# Patient Record
Sex: FEMALE | Race: WHITE | Employment: OTHER | URBAN - METROPOLITAN AREA
[De-identification: names, ages, dates, MRNs, and addresses within clinical notes are randomized per-mention and may not be internally consistent; named-entity substitution may affect disease eponyms.]

---

## 2019-01-07 ENCOUNTER — HOSPITAL ENCOUNTER (OUTPATIENT)
Dept: GENERAL RADIOLOGY | Age: 74
Discharge: HOME | End: 2019-01-07
Payer: MEDICARE

## 2019-01-07 DIAGNOSIS — M47.814 SPONDYLOSIS OF THORACIC REGION WITHOUT MYELOPATHY OR RADICULOPATHY: ICD-10-CM

## 2019-01-07 PROCEDURE — 72070 X-RAY EXAM THORAC SPINE 2VWS: CPT

## 2019-05-13 ENCOUNTER — HOSPITAL ENCOUNTER (OUTPATIENT)
Dept: GENERAL RADIOLOGY | Age: 74
Discharge: HOME | End: 2019-05-13
Payer: MEDICARE

## 2019-05-13 DIAGNOSIS — Z13.820 ENCOUNTER FOR IMAGING TO ASSESS OSTEOPOROSIS: ICD-10-CM

## 2019-05-13 PROCEDURE — 77080 DXA BONE DENSITY AXIAL: CPT

## 2020-12-23 ENCOUNTER — HOSPITAL ENCOUNTER (INPATIENT)
Age: 75
LOS: 16 days | Discharge: HOME HEALTH | DRG: 560 | End: 2021-01-08
Attending: PHYSICAL MEDICINE & REHABILITATION | Admitting: PHYSICAL MEDICINE & REHABILITATION
Payer: MEDICARE

## 2020-12-23 DIAGNOSIS — S72.402A CLOSED FRACTURE OF DISTAL END OF LEFT FEMUR, INITIAL ENCOUNTER (HCC): Primary | ICD-10-CM

## 2020-12-23 PROBLEM — S72.002A HIP FRACTURE REQUIRING OPERATIVE REPAIR, LEFT, CLOSED, INITIAL ENCOUNTER (HCC): Status: ACTIVE | Noted: 2020-12-23

## 2020-12-23 LAB
GLUCOSE BLD-MCNC: 139 MG/DL (ref 70–99)
PERFORMED ON: ABNORMAL

## 2020-12-23 PROCEDURE — 6370000000 HC RX 637 (ALT 250 FOR IP): Performed by: PHYSICAL MEDICINE & REHABILITATION

## 2020-12-23 PROCEDURE — 1280000000 HC REHAB R&B

## 2020-12-23 RX ORDER — HYOSCYAMINE SULFATE EXTENDED-RELEASE 0.38 MG/1
375 TABLET ORAL EVERY 12 HOURS PRN
Status: DISCONTINUED | OUTPATIENT
Start: 2020-12-23 | End: 2021-01-08 | Stop reason: HOSPADM

## 2020-12-23 RX ORDER — DEXTROSE MONOHYDRATE 25 G/50ML
12.5 INJECTION, SOLUTION INTRAVENOUS PRN
Status: DISCONTINUED | OUTPATIENT
Start: 2020-12-23 | End: 2021-01-08 | Stop reason: HOSPADM

## 2020-12-23 RX ORDER — MELOXICAM 7.5 MG/1
7.5 TABLET ORAL DAILY
Status: ON HOLD | COMMUNITY
End: 2021-01-08 | Stop reason: HOSPADM

## 2020-12-23 RX ORDER — PANTOPRAZOLE SODIUM 40 MG/1
40 TABLET, DELAYED RELEASE ORAL
Status: DISCONTINUED | OUTPATIENT
Start: 2020-12-24 | End: 2021-01-08 | Stop reason: HOSPADM

## 2020-12-23 RX ORDER — CLOPIDOGREL BISULFATE 75 MG/1
75 TABLET ORAL
Status: ON HOLD | COMMUNITY
End: 2021-01-08 | Stop reason: HOSPADM

## 2020-12-23 RX ORDER — TRAMADOL HYDROCHLORIDE 50 MG/1
50 TABLET ORAL EVERY 6 HOURS PRN
Status: DISCONTINUED | OUTPATIENT
Start: 2020-12-23 | End: 2021-01-08 | Stop reason: HOSPADM

## 2020-12-23 RX ORDER — METOPROLOL SUCCINATE 50 MG/1
50 TABLET, EXTENDED RELEASE ORAL DAILY
Status: ON HOLD | COMMUNITY
End: 2021-01-08 | Stop reason: HOSPADM

## 2020-12-23 RX ORDER — HYDROCHLOROTHIAZIDE 25 MG/1
25 TABLET ORAL DAILY
Status: ON HOLD | COMMUNITY
End: 2021-01-08 | Stop reason: HOSPADM

## 2020-12-23 RX ORDER — GABAPENTIN 300 MG/1
300 CAPSULE ORAL 3 TIMES DAILY
Status: DISCONTINUED | OUTPATIENT
Start: 2020-12-23 | End: 2021-01-02

## 2020-12-23 RX ORDER — CHOLESTYRAMINE LIGHT 4 G/5.7G
4 POWDER, FOR SUSPENSION ORAL DAILY
Status: DISCONTINUED | OUTPATIENT
Start: 2020-12-24 | End: 2021-01-08 | Stop reason: HOSPADM

## 2020-12-23 RX ORDER — DIPHENOXYLATE HYDROCHLORIDE AND ATROPINE SULFATE 2.5; .025 MG/1; MG/1
1 TABLET ORAL 4 TIMES DAILY PRN
Status: DISCONTINUED | OUTPATIENT
Start: 2020-12-23 | End: 2020-12-24

## 2020-12-23 RX ORDER — ESTRADIOL 0.1 MG/G
2 CREAM VAGINAL
Status: DISCONTINUED | OUTPATIENT
Start: 2020-12-23 | End: 2021-01-08 | Stop reason: HOSPADM

## 2020-12-23 RX ORDER — LEVOTHYROXINE SODIUM 0.12 MG/1
125 TABLET ORAL DAILY
COMMUNITY

## 2020-12-23 RX ORDER — SERTRALINE HYDROCHLORIDE 100 MG/1
100 TABLET, FILM COATED ORAL DAILY
Status: DISCONTINUED | OUTPATIENT
Start: 2020-12-24 | End: 2021-01-08 | Stop reason: HOSPADM

## 2020-12-23 RX ORDER — ACETAMINOPHEN 325 MG/1
650 TABLET ORAL EVERY 4 HOURS PRN
Status: DISCONTINUED | OUTPATIENT
Start: 2020-12-23 | End: 2021-01-08 | Stop reason: HOSPADM

## 2020-12-23 RX ORDER — INSULIN LISPRO 100 [IU]/ML
0-12 INJECTION, SOLUTION INTRAVENOUS; SUBCUTANEOUS
Status: DISCONTINUED | OUTPATIENT
Start: 2020-12-23 | End: 2020-12-30

## 2020-12-23 RX ORDER — DEXTROSE MONOHYDRATE 50 MG/ML
100 INJECTION, SOLUTION INTRAVENOUS PRN
Status: DISCONTINUED | OUTPATIENT
Start: 2020-12-23 | End: 2021-01-08 | Stop reason: HOSPADM

## 2020-12-23 RX ORDER — TIZANIDINE 2 MG/1
2 TABLET ORAL DAILY PRN
Status: ON HOLD | COMMUNITY
End: 2021-01-08 | Stop reason: HOSPADM

## 2020-12-23 RX ORDER — ERGOCALCIFEROL 1.25 MG/1
50000 CAPSULE ORAL WEEKLY
Status: DISCONTINUED | OUTPATIENT
Start: 2020-12-27 | End: 2021-01-08 | Stop reason: HOSPADM

## 2020-12-23 RX ORDER — LANOLIN ALCOHOL/MO/W.PET/CERES
1000 CREAM (GRAM) TOPICAL DAILY
Status: DISCONTINUED | OUTPATIENT
Start: 2020-12-24 | End: 2020-12-24

## 2020-12-23 RX ORDER — NITROGLYCERIN 0.4 MG/1
0.4 TABLET SUBLINGUAL EVERY 5 MIN PRN
Status: DISCONTINUED | OUTPATIENT
Start: 2020-12-23 | End: 2021-01-08 | Stop reason: HOSPADM

## 2020-12-23 RX ORDER — HYDROCHLOROTHIAZIDE 25 MG/1
25 TABLET ORAL DAILY
Status: DISCONTINUED | OUTPATIENT
Start: 2020-12-24 | End: 2021-01-06

## 2020-12-23 RX ORDER — SERTRALINE HYDROCHLORIDE 100 MG/1
100 TABLET, FILM COATED ORAL NIGHTLY
COMMUNITY

## 2020-12-23 RX ORDER — HYDROCODONE BITARTRATE AND ACETAMINOPHEN 5; 325 MG/1; MG/1
1 TABLET ORAL EVERY 6 HOURS PRN
Status: DISCONTINUED | OUTPATIENT
Start: 2020-12-23 | End: 2020-12-30

## 2020-12-23 RX ORDER — ALPRAZOLAM 0.5 MG/1
0.5 TABLET ORAL 2 TIMES DAILY PRN
Status: DISCONTINUED | OUTPATIENT
Start: 2020-12-23 | End: 2021-01-08 | Stop reason: HOSPADM

## 2020-12-23 RX ORDER — PRAVASTATIN SODIUM 40 MG
20 TABLET ORAL NIGHTLY
Status: DISCONTINUED | OUTPATIENT
Start: 2020-12-23 | End: 2020-12-24

## 2020-12-23 RX ORDER — INSULIN LISPRO 100 [IU]/ML
0-6 INJECTION, SOLUTION INTRAVENOUS; SUBCUTANEOUS NIGHTLY
Status: DISCONTINUED | OUTPATIENT
Start: 2020-12-23 | End: 2020-12-30

## 2020-12-23 RX ORDER — TIZANIDINE 4 MG/1
2 TABLET ORAL EVERY 8 HOURS PRN
Status: DISCONTINUED | OUTPATIENT
Start: 2020-12-23 | End: 2021-01-02

## 2020-12-23 RX ORDER — CLOPIDOGREL BISULFATE 75 MG/1
75 TABLET ORAL DAILY
Status: DISCONTINUED | OUTPATIENT
Start: 2020-12-24 | End: 2021-01-08 | Stop reason: HOSPADM

## 2020-12-23 RX ORDER — METOPROLOL SUCCINATE 50 MG/1
50 TABLET, EXTENDED RELEASE ORAL DAILY
Status: DISCONTINUED | OUTPATIENT
Start: 2020-12-24 | End: 2021-01-08 | Stop reason: HOSPADM

## 2020-12-23 RX ORDER — POLYETHYLENE GLYCOL 3350 17 G/17G
17 POWDER, FOR SOLUTION ORAL DAILY PRN
Status: DISCONTINUED | OUTPATIENT
Start: 2020-12-23 | End: 2021-01-08 | Stop reason: HOSPADM

## 2020-12-23 RX ORDER — NICOTINE POLACRILEX 4 MG
15 LOZENGE BUCCAL PRN
Status: DISCONTINUED | OUTPATIENT
Start: 2020-12-23 | End: 2021-01-08 | Stop reason: HOSPADM

## 2020-12-23 RX ORDER — GABAPENTIN 300 MG/1
300 CAPSULE ORAL 3 TIMES DAILY
Status: ON HOLD | COMMUNITY
End: 2021-01-08 | Stop reason: HOSPADM

## 2020-12-23 RX ADMIN — ALPRAZOLAM 0.5 MG: 0.5 TABLET ORAL at 23:03

## 2020-12-23 RX ADMIN — GABAPENTIN 300 MG: 300 CAPSULE ORAL at 20:46

## 2020-12-23 RX ADMIN — TIZANIDINE 2 MG: 4 TABLET ORAL at 20:47

## 2020-12-23 RX ADMIN — APIXABAN 2.5 MG: 5 TABLET, FILM COATED ORAL at 20:46

## 2020-12-23 RX ADMIN — DICLOFENAC 2 G: 10 GEL TOPICAL at 20:47

## 2020-12-23 RX ADMIN — PRAVASTATIN SODIUM 20 MG: 40 TABLET ORAL at 20:46

## 2020-12-23 RX ADMIN — HYDROCODONE BITARTRATE AND ACETAMINOPHEN 1 TABLET: 5; 325 TABLET ORAL at 20:47

## 2020-12-23 ASSESSMENT — PAIN DESCRIPTION - PROGRESSION: CLINICAL_PROGRESSION: GRADUALLY IMPROVING

## 2020-12-23 ASSESSMENT — PAIN DESCRIPTION - ORIENTATION: ORIENTATION: RIGHT

## 2020-12-23 ASSESSMENT — PAIN DESCRIPTION - ONSET: ONSET: ON-GOING

## 2020-12-23 ASSESSMENT — PAIN SCALES - GENERAL: PAINLEVEL_OUTOF10: 6

## 2020-12-23 NOTE — PLAN OF CARE
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[facility]    Nodule of right lung    Diabetes mellitus ([facility]    HTN (hypertension)    GERD (gastroesophageal reflux disease)    Arthritis    Hypothyroidism    Anxiety disorder    Gastroesophageal reflux disease with hiatal hernia    Status post lung surgery    Intermittent atrial fibrillation (HCC)    B12 deficiency    Iron deficiency anemia due to dietary causes    Unspecified adverse effect of other drug, medicinal and biological substance(995.29)    Factor V Leiden ([facility]    Pulmonary embolism (HCC)    Deep vein thrombosis (DVT) (HCC)    Treatment    Iron deficiency    Malabsorption of iron       Rehabilitation Diagnosis:  Orthopedic, 8.9, Other Orthopedic   ADMIT DATE:2020    Patient Goals:  To safely return home with sposue   Admitting Impairments: Decreased ability to ambulate, decreased mobility, decreased balance, decreased safety, decreased ability to transfer, decreased ability to perform ADLs  Activity Restrictions:   WB precautions  Participation Limitations: none   CARE PLAN     NURSING:  Leighton Chanel while on this unit will:  [] Be continent of bowel and bladder     [x] Have an adequate number of bowel movements  [x] Urinate with no urinary retention >300ml in bladder  [] Complete bladder protocol with villegas removal  [] Maintain O2 SATs at ___%  [x] Have pain managed while on ARU       [] Be pain free by discharge   [x] Have no skin breakdown while on ARU  [x] Have improved skin integrity via wound measurements  [] Have no signs/symptoms of infection at the wound site  [x] Be free from injury during hospitalization [] Complete education with patient/family with understanding demonstrated for:  [] Adjustment   [] Other:     Nursing Interventions will include:  [] bowel/bladder training   [] education for medical assistive devices   [x] medication education   [] O2 saturation management   [x] energy conservation   [x] stress management techniques   [x] fall prevention   [x] alarms protocol   [x] seating and positioning   [] skin/wound care   [x] pressure relief instruction   [] dressing changes     [x] infection protection   [x] DVT prophylaxis  [x] assistance with in room safety with transfers to bed, toilet, wheelchair, shower   [x] bathroom activities and hygiene  [] Other:    Patient/Caregiver Education for:  [] Disease/sustained injury/management     [] Medication Use  [x] Surgical intervention  [x] Safety  [x] Body mechanics and or joint protection  [x] Health maintenance     [] Other:     PHYSICAL THERAPY:  Goals: These goals were reviewed with this patient at the time of assessment and Jennifer Dys is in agreement. Plan of Care: Pt to be seen 5 out of 7 days per week per ARU protocol (90 minutes with PT)                       OCCUPATIONAL THERAPY:  Goals:               :    :  These goals were reviewed with this patient at the time of assessment and Jennifer Dys is in agreement    Plan of Care:  Pt to be seen 5 out of 7 days per week per ARU protocol (90 minutes with OT)       SPEECH THERAPY: Goals will be left blank if speech is not following this patient.   Goals:                                                                               Plan of Care:  Pt to be seen 5 out of 7 days per week per ARU protocol (0 minutes with SLP)    Therapy Treatments will include: [x]  therapeutic exercises    [x]  gait training     []  neuromuscular re-ed                            [x]  transfer training             [] community reintegration    [x] bed mobility                          [x]  w/c mobility and training  [x]  self care    [x]home mgmt    []  cognitive training            [x]  energy conservation        []  dysphagia tx    []  speech/language/communication therapy   []  group therapy    [x]  patient/family education    [] Other:    CASE MANAGEMENT:  Goals: Plan return home with spouse and skilled Home Care. SW to also assist with DME needs. Assist patient/family with discharge planning, patient/family counseling,   and coordination with insurance during ARU stay. Admission Period/Goal QM SCORES  QM Admit/Goal Score   Eating   /     Oral Hygiene   /     Shower/Bathing   /     UB Dressing   /     LB Dressing   /     Putting on/off Footwear   /     Toileting Hygiene   /     Bladder Continence      Bowel Continence      Toilet Transfers   /     Shower/Bathe Self    /     Rolling Left and Right   /     Sit to Lying   /     Lying to Sitting on Bedside   /     Sit to Stand   /     Chair/Bed to Chair Transfer   /     Car Transfers   /     Walk 10 Feet   /     Walk 50 Feet with Two Turns   /     Walk 150 Feet   /     Walk 10 Feet on Uneven Surfaces   /     1 Step (Curb)   /     4 Steps   /     12 Steps   /     Picking up Object from Floor   /     Wheel 50 Feet with 2 Turns   /     Type         [] Manual        [] Motorized        [] N/A   Wheel 150 Feet   /     Type         [] Manual        [] Motorized        [] N/A        Vladimir Stewart will be seen a minimum of 3 hours of therapy per day, a minimum of 5 out of 7 days per week (please see above for specific treatment plan per PT/OT/SLP). [] In this rare instance due to the nature of this patient's medical involvement, this patient will be seen 15 hours per week (900 minutes within a 7day period). In addition, dietician/nutritionist may monitor calorie count as well as intake and collaboratively work with SLP on dietary upgrades. Neuropsychology/Psychology may evaluate and provide necessary support.     Medical issues being managed closely and that require 24hour availability of a physician:  [] Swallowing Precautions  [x] Bowel/Bladder Fx  [] Weight bearing precautions  [x] Wound Care    [x] Pain Mgmt   [] Infection Protection  [x] DVT Prophylaxis   [x] Fall Precautions  [x] Fluid/Electrolyte/Nutrition Balance  [] Voice Protection   [] Respiratory  [] Other:    Medical Prognosis: [x] Good  [] Fair    [] Guarded   Total expected IRF days 17  Anticipated discharge destination: Home with sposue  [x] Home Independently   [] Home Modified Independent  [] Home with supervision    []SNF     [] Other                                           Physician anticipated functional outcomes:  Home with LRAD IPOC brief synthesis: 66year old matteo who fell in her bathroom yesterday morning when her right leg gave out causing her to fall jackson her left side. She had immediate pain in left leg and inability to ambulate so was brought to [facility] ED by EMS. Imaging revealed left distal femur fracture. PMH significant for chronic back pain, bilateral total knee arthroplasties, & Diabetes. She has chronic right leg pain due to documented L4-5 lumbar stenosis. She is followed by Dr. Jovani Hernandez for iron deficiency anemia. She has a history of heterozygous factor V Leiden discovered at time of PE in 2015, was fully anticoagulated for 1 yr, then placed on ASA, but had reaction. She was NOT on any anticoagulation prior to admission. Received a dose or 2 of heparin pre-op. ORIF of the left femur was completed 12/20/2020. Post-op complications include UTI, anemia, and shooting/burning pain involving her right calf. Patient believes this pain is associated with her neuropathic pain from her lumbar stenosis. MD ordered doppler since patient has a hx of DVT. She was due for B12 injection in office next week but hematology decided to administer B 12 on 12/23/20 prior to transfer to ARU. Patient has remained on prophylactic Eliquis and venous ultrasound was unremarkable. On the day of DC to ARU she received 1 IV iron sucrose 300 mg prior to transfer. She is planned to receive 2 more doses at rehab.  Patient was evaluated by PT/OT and is currently functioning below her baseline. Prior to admission she was independent with all adls, iadls, and functional mobility without and AD. She is now requiring assist x 2 for functional mobility. Patient is motivated and able to tolerate 3 hrs of therapy per day in inpatient rehab. She is now medically stable for transfer. This initial ARU patient treatment plan of care, together with the IPOC & the Education plan, form the foundation for the patient's plan of care. Weekly patient care conferences are held to evaluate progress towards the initial treatment plan & goals.     I have reviewed this initial plan of care and agree with its contents:    Title   Name    Date    Time    Physician: Ashley Lopez D.O. M.P.H  PM&R  12/26/2020  11:15 AM        Case Mgmt: ABDIRASHID Whittington  12/24/2020  1204    OT: Reshma Oconnell OTR/L 12/24/2020 @     PT: Amari Plunkett LPT # 9722 12/24/2020 @ [phone]    RN: Liya Melendez RN 12/23/2020 1843    ST:    :  Tasia Lyons MA, PD  12/26/2020 12:10    Other:

## 2020-12-23 NOTE — CARE COORDINATION
Patient Name: Chanell Lisa  : 1945  Address  [address] [place] [id number]      Emergency Contact(s)  Yao Worthington Daughter [phone]      [facility] Daughter [phone]      Mario Lincoln Daughter [phone]          Insurance:   Valentina Barnett / Kayley Garlandter [id number]  [address] [place] [address] / Manual Osman [id number]     Referring MD: Sally Aragon MD  Primary Care Physician  Harvey Burgos MD     Dx: Displaced spiral fracture of shaft of left femur ([address]     Allergies    Aspirin  Shortness of Breath        20 Stated this was years ago. Had stuffy nose and red eyes. Did not mention SOB. Stated she may be fine to take this now.   Percocet [Oxycodone-Acetaminophen]  Confusion        20 stated she was 'off the wall' w/ Percocet.   Bactrim  Rash    Codeine  Rash    Nickel  Rash    Penicillins  Rash    Sulfamethoxazole-Trimethoprim  Rash    Trimethoprim  Unknown    Consults:    IP CONSULT TO ONCOLOGY  IP CONSULT TO CARDIOLOGY  IP CONSULT TO ORTHOPEDIC SURGERY  IP CONSULT TO NUTRITION SERVICES  IP CONSULT TO PHYSICAL THERAPY  IP CONSULT TO OCCUPATIONAL THERAPY  PHARMACY TO DOSE MEDICATION     Procedures:  Mri Lumbar Spine Wo Con     Result Date: 2020  L4-5 circumferential disc bulge with a right synovial facet cyst in the posterior right epidural space resulting in severe stenosis of the right subarticular zone/lateral recess, with mass effect on the descending right nerve roots.       Xr Chest Ap Portable     Result Date: 2020  No acute abnormality      Xr Femur Left Ap And Lateral     Result Date: 2020  No unexpected findings on limited intraoperative radiographic exam       Xr Femur Left Ap And Lateral     Result Date: 2020   Distal femoral shaft spiral fracture with mild displacement       Xr Hip Left Ap And Lat W Or Wo Pelvis     Result Date: 2020 Limited evaluation of left hip. Consider cross-sectional imaging if patient is nonweightbearing       Ct Hip Left Wo Contrast     Result Date: 12/20/2020  Normal hips. Procedure(s):  OPEN REDUCTION INTERNAL FIXATION LEFT DISTAL FEMUR     Physical Exam:    BP 97/53 (Patient Position: Lying)   Pulse 83   Temp 98.6 °F (37 °C) (Oral)   Resp 18   Ht 62\" (157.5 cm)   Wt 187 lb (84.8 kg)   SpO2 99%   BMI 34.20 kg/m²    Precautions  Weight Bearing  left lower extremity toe touch flat foot  Brace/Sling (wear)  knee immobilizer  Additional Comments  fall risk    Diet: Regular    DVT Proph: apixaban 2.5 mg Tabs  Commonly known as: ELIQUIS  Take 1 tablet by mouth 2 (two) times daily.     Covid PCR Negative on 12/20/20 1. Left femur fracture. Test post ORIF 12/21/2020. Patient will proceed with treatment per orthopedic protocol. 2. Diabetes. Patient will continue with bolus correction regimen. Adjust as required once oral take is increased. 3. Coagulopathy. Patient has a history of heterozygous factor V Leiden deficiency. Patient has started Eliquis 2.5 mg by mouth twice a day per hematology recommendations. 4. Coronary artery disease. Clopidogrel has been restarted. 5. UTI. Patient's preprocedure urinalysis is consistent with urinary tract infection, greater than 100,000 CFU Klebsiella pneumonia. She continues on 1 g IV daily pending culture results. 6. Normocytic anemia. Hemoglobin on admission was 11.8. It has decreased to 8.7 on 12/22 and 7.3 on 12/23. Continue to monitor. Discharge Medications:        Medication List     START taking these medications   apixaban 2.5 mg Tabs  Commonly known as: ELIQUIS     hydrocodone-acetaminophen  MG Tabs  Commonly known as: [address]  Replaces: hydrocodone-acetaminophen 7.5-325 MG Tabs     * insulin aspart 100 UNIT/ML Soln injection  Commonly known as: NOVOLOG     * insulin aspart 100 UNIT/ML Soln injection  Commonly known as: NOVOLOG     traMADol 50 MG Tabs  Commonly known as: ULTRAM        (very important) * This list has 2 medication(s) that are the same as other medications prescribed for you. Read the directions carefully, and ask your doctor or other care provider to review them with you.         CONTINUE taking these medications   ALPRAZolam 0.5 MG Tabs  Commonly known as: XANAX  TAKE (1) TABLET BY MOUTH TWICE A DAY AS NEEDED FOR UP TO 30 DAYS     clopidogrel 75 mg Tabs  Commonly known as: PLAVIX     colestipol 1 g Tabs  Commonly known as: COLESTID     cyanocobalamin 1000 MCG/ML Soln  Commonly known as: VITAMIN B12     diclofenac 1 % Gel  Commonly known as: VOLTAREN GEL     diphenhydrAMINE-acetaminophen  MG Tabs  Commonly known as: TYLENOL PM diphenoxylate-atropine 2.5-0.025 MG Tabs  Commonly known as: LOMOTIL  TAKE 1 TABLET BY MOUTH 2 (TWO) TIMES DAILY     ergocalciferol 1.25 MG (92678 UT) Caps  Commonly known as: ERGOCALCIFEROL     estradiol 0.1 MG/GM Crea  Commonly known as: ESTRACE     gabapentin 300 MG Caps  Commonly known as: NEURONTIN     hydrochlorothiazide 25 MG Tabs  Commonly known as: HYDRODIURIL  Take 1 tablet by mouth daily. hyoscyamine 0.375 MG Tb12  Commonly known as: LEVBID  TAKE 1 TABLET BY MOUTH DAILY     levothyroxine 125 MCG Tabs  Commonly known as: SYNTHROID, LEVOTHROID  Take 1 tablet by mouth daily. metoprolol succinate 50 MG Tb24  Commonly known as: TOPROL-XL  Take 1 tablet by mouth daily. nitroGLYCERIN 0.4 MG Subl  Commonly known as: NITROSTAT  1 tablet by Sublingual route every 5 (five) minutes as needed (Every 5 minutes as needed for chest painfor up to 3 doses per episode). * omeprazole 40 MG Cpdr  Commonly known as: PRILOSEC     * omeprazole 20 MG Cpdr  Commonly known as: PRILOSEC     pravastatin 20 MG Tabs  Commonly known as: PRAVACHOL  TAKE 1/2 TABLET (10 MG) BY MOUTH DAILY.      Probiotic Product Chew     sertraline 100 MG Tabs  Commonly known as: ZOLOFT     tizanidine 2 MG Tabs  Commonly known as: Edgard Hutching

## 2020-12-23 NOTE — PROGRESS NOTES
4 Eyes Skin Assessment     The patient is being assess for   Admission    I agree that 2 RN's have performed a thorough Head to Toe Skin Assessment on the patient. ALL assessment sites listed below have been assessed. Areas assessed by both nurses:   [x]   Head, Face, and Ears   [x]   Shoulders, Back, and Chest, Abdomen  [x]   Arms, Elbows, and Hands   [x]   Coccyx, Sacrum, and Ischium  [x]   Legs, Feet, and Heels            **SHARE this note so that the co-signing nurse is able to place an eSignature**    Co-signer eSignature: Electronically signed by Zakiya Maynard on 12/23/20 at 6:57 PM EST    Does the Patient have Skin Breakdown? Yes LDA WOUND CARE was Initiated documentation include the Bertha-wound, Wound Assessment, Measurements, Dressing Treatment, Drainage, and Color\", Surgical incision to left leg with staples. Scattered bruising.           Wai Prevention initiated:  Yes   Wound Care Orders initiated:  Yes      [address]  nurse consulted for Pressure Injury (Stage 3,4, Unstageable, DTI, NWPT, Complex wounds)and New or Established Ostomies:  NA      Primary Nurse eSignature: Electronically signed by Colton Moran RN on 12/23/20 at 6:49 PM EST

## 2020-12-23 NOTE — PROGRESS NOTES
Jhonatan Murray  12/23/2020  [id number]    Rehab Brief Consult:    Patient is able to tolerate 3 hours of therapy 5 days per week and is medically appropriate for rehab at the Acute Rehabilitation Unit. Approved for admission today. Orders placed for transfer. Thank you for the consultation.     Ankush Boogie D.O. M.P.H  PM&R  12/23/2020  5:08 PM

## 2020-12-24 LAB
GLUCOSE BLD-MCNC: 152 MG/DL (ref 70–99)
GLUCOSE BLD-MCNC: 152 MG/DL (ref 70–99)
GLUCOSE BLD-MCNC: 163 MG/DL (ref 70–99)
GLUCOSE BLD-MCNC: 174 MG/DL (ref 70–99)
PERFORMED ON: ABNORMAL

## 2020-12-24 PROCEDURE — 97166 OT EVAL MOD COMPLEX 45 MIN: CPT

## 2020-12-24 PROCEDURE — 1280000000 HC REHAB R&B

## 2020-12-24 PROCEDURE — 97530 THERAPEUTIC ACTIVITIES: CPT

## 2020-12-24 PROCEDURE — 6370000000 HC RX 637 (ALT 250 FOR IP): Performed by: PHYSICAL MEDICINE & REHABILITATION

## 2020-12-24 PROCEDURE — 97530 THERAPEUTIC ACTIVITIES: CPT | Performed by: PHYSICAL THERAPIST

## 2020-12-24 PROCEDURE — 97535 SELF CARE MNGMENT TRAINING: CPT

## 2020-12-24 PROCEDURE — 97162 PT EVAL MOD COMPLEX 30 MIN: CPT | Performed by: PHYSICAL THERAPIST

## 2020-12-24 RX ORDER — DIPHENOXYLATE HYDROCHLORIDE AND ATROPINE SULFATE 2.5; .025 MG/1; MG/1
1 TABLET ORAL 2 TIMES DAILY
Status: DISCONTINUED | OUTPATIENT
Start: 2020-12-24 | End: 2021-01-08 | Stop reason: HOSPADM

## 2020-12-24 RX ORDER — PRAVASTATIN SODIUM 10 MG
10 TABLET ORAL NIGHTLY
Status: DISCONTINUED | OUTPATIENT
Start: 2020-12-24 | End: 2021-01-08 | Stop reason: HOSPADM

## 2020-12-24 RX ADMIN — LEVOTHYROXINE SODIUM 125 MCG: 0.03 TABLET ORAL at 06:14

## 2020-12-24 RX ADMIN — HYDROCODONE BITARTRATE AND ACETAMINOPHEN 1 TABLET: 5; 325 TABLET ORAL at 12:45

## 2020-12-24 RX ADMIN — ALPRAZOLAM 0.5 MG: 0.5 TABLET ORAL at 21:55

## 2020-12-24 RX ADMIN — HYDROCODONE BITARTRATE AND ACETAMINOPHEN 1 TABLET: 5; 325 TABLET ORAL at 06:19

## 2020-12-24 RX ADMIN — GABAPENTIN 300 MG: 300 CAPSULE ORAL at 08:17

## 2020-12-24 RX ADMIN — HYDROCODONE BITARTRATE AND ACETAMINOPHEN 1 TABLET: 5; 325 TABLET ORAL at 18:54

## 2020-12-24 RX ADMIN — DICLOFENAC 2 G: 10 GEL TOPICAL at 21:45

## 2020-12-24 RX ADMIN — DIPHENOXYLATE HYDROCHLORIDE AND ATROPINE SULFATE 1 TABLET: 2.5; .025 TABLET ORAL at 21:55

## 2020-12-24 RX ADMIN — APIXABAN 2.5 MG: 5 TABLET, FILM COATED ORAL at 08:17

## 2020-12-24 RX ADMIN — SERTRALINE HYDROCHLORIDE 100 MG: 100 TABLET ORAL at 08:18

## 2020-12-24 RX ADMIN — INSULIN LISPRO 2 UNITS: 100 INJECTION, SOLUTION INTRAVENOUS; SUBCUTANEOUS at 07:58

## 2020-12-24 RX ADMIN — INSULIN LISPRO 2 UNITS: 100 INJECTION, SOLUTION INTRAVENOUS; SUBCUTANEOUS at 17:36

## 2020-12-24 RX ADMIN — HYDROCHLOROTHIAZIDE 25 MG: 25 TABLET ORAL at 08:18

## 2020-12-24 RX ADMIN — APIXABAN 2.5 MG: 5 TABLET, FILM COATED ORAL at 21:55

## 2020-12-24 RX ADMIN — HYOSCYAMINE SULFATE 375 MCG: 0.38 TABLET, EXTENDED RELEASE ORAL at 08:17

## 2020-12-24 RX ADMIN — METOPROLOL SUCCINATE 50 MG: 50 TABLET, EXTENDED RELEASE ORAL at 10:26

## 2020-12-24 RX ADMIN — GABAPENTIN 300 MG: 300 CAPSULE ORAL at 14:49

## 2020-12-24 RX ADMIN — GABAPENTIN 300 MG: 300 CAPSULE ORAL at 21:55

## 2020-12-24 RX ADMIN — PANTOPRAZOLE SODIUM 40 MG: 40 TABLET, DELAYED RELEASE ORAL at 06:14

## 2020-12-24 RX ADMIN — DICLOFENAC 2 G: 10 GEL TOPICAL at 08:20

## 2020-12-24 RX ADMIN — INSULIN LISPRO 2 UNITS: 100 INJECTION, SOLUTION INTRAVENOUS; SUBCUTANEOUS at 21:49

## 2020-12-24 RX ADMIN — CLOPIDOGREL BISULFATE 75 MG: 75 TABLET ORAL at 08:18

## 2020-12-24 RX ADMIN — INSULIN LISPRO 2 UNITS: 100 INJECTION, SOLUTION INTRAVENOUS; SUBCUTANEOUS at 12:16

## 2020-12-24 RX ADMIN — CHOLESTYRAMINE 4 G: 4 POWDER, FOR SUSPENSION ORAL at 08:18

## 2020-12-24 ASSESSMENT — PAIN DESCRIPTION - LOCATION
LOCATION: LEG

## 2020-12-24 ASSESSMENT — PAIN DESCRIPTION - ONSET
ONSET: ON-GOING
ONSET: ON-GOING

## 2020-12-24 ASSESSMENT — PAIN SCALES - GENERAL
PAINLEVEL_OUTOF10: 8
PAINLEVEL_OUTOF10: 6
PAINLEVEL_OUTOF10: 7
PAINLEVEL_OUTOF10: 7

## 2020-12-24 ASSESSMENT — PAIN DESCRIPTION - PAIN TYPE: TYPE: SURGICAL PAIN

## 2020-12-24 ASSESSMENT — PAIN - FUNCTIONAL ASSESSMENT: PAIN_FUNCTIONAL_ASSESSMENT: PREVENTS OR INTERFERES SOME ACTIVE ACTIVITIES AND ADLS

## 2020-12-24 ASSESSMENT — PAIN DESCRIPTION - FREQUENCY
FREQUENCY: CONTINUOUS
FREQUENCY: CONTINUOUS

## 2020-12-24 ASSESSMENT — PAIN DESCRIPTION - PROGRESSION
CLINICAL_PROGRESSION: GRADUALLY IMPROVING
CLINICAL_PROGRESSION: NOT CHANGED

## 2020-12-24 ASSESSMENT — PAIN DESCRIPTION - ORIENTATION: ORIENTATION: LEFT

## 2020-12-24 NOTE — PROGRESS NOTES
Occupational Therapy   Occupational Therapy Initial Assessment/Treatment Note  Date: 2020   Patient Name: Apple Keene  MRN: [id number]     : 1945    Date of Service: 2020    Discharge Recommendations:  Continue to assess pending progress, 24 hour supervision or assist, Home with Home health OT  OT Equipment Recommendations  Other: continue to assess pending progress    Assessment   Performance deficits / Impairments: Decreased functional mobility ; Decreased endurance;Decreased ADL status; Decreased balance;Decreased strength;Decreased high-level IADLs;Decreased fine motor control  Assessment: Pt is a 67 y/o F s/p mechanical fall at home resulting in L distal femur fracture and now has weightbearing restrictions on LLE resulting in a significant decline in functional status. Pt requires 2 person assist for toileting and LB bathing and dressing d/t impaired standing balance, decreased strength and WBing limitations. Pt is non-compliant with WBing restrictions and requires frequent VCs. Pt will benefit from skilled OT to maximize functional independence for safe d/c home. Treatment Diagnosis: decreased ADL status, functional transfers and functional mobility 2/2 L distal femur fx  Prognosis: Good  Decision Making: Medium Complexity  OT Education: OT Role;Plan of Care;Precautions; ADL Adaptive Strategies;Transfer Training  REQUIRES OT FOLLOW UP: Yes  Activity Tolerance  Activity Tolerance: Patient Tolerated treatment well  Activity Tolerance: pt reporting fatigue after shower  Safety Devices  Safety Devices in place: Yes  Type of devices: Left in chair;Call light within reach; Chair alarm in place; All fall risk precautions in place           Patient Diagnosis(es): There were no encounter diagnoses. has a past medical history of Allergic, Anemia, Arthritis, Chronic kidney disease, Depression, Diabetes mellitus ([address] Disorders affecting multiple structures of eye, Epigastric pain, GERD (gastroesophageal reflux disease), Hyperlipidemia, Hypertension, IBS (irritable bowel syndrome), Intermittent atrial fibrillation ([address] Mass of lung, Migraine, Nausea & vomiting, Status post lung surgery, and Thyroid disease. has a past surgical history that includes brain surgery; joint replacement; Cholecystectomy; Bunionectomy; Hysterectomy; thoracotomy (3/10/11); Endoscopy, colon, diagnostic (8/22/2011); Appendectomy; colectomy; Cataract removal (2012); and knee surgery (Left, 10/11/2016). Treatment Diagnosis: decreased ADL status, functional transfers and functional mobility 2/2 L distal femur fx      Restrictions  Restrictions/Precautions  Restrictions/Precautions: Weight Bearing  Required Braces or Orthoses?: Yes(L knee immobilizer)  Lower Extremity Weight Bearing Restrictions  Left Lower Extremity Weight Bearing: Foot Flat Weight Bearing(Both TTWB and foot flat WB restrictions are noted in chart)  Position Activity Restriction  Other position/activity restrictions: L TTWB and foot flat WB listed, L knee immobilizer on at all times    Subjective   General  Chart Reviewed: Yes  Patient assessed for rehabilitation services?: Yes  Additional Pertinent Hx: 67 y/o F with mechanical fall at home in bathroom. Imaging revealed left distal femur fracture and is now TTWB on LLE. PMH significant for chronic back pain, bilateral total knee arthroplasties, & Diabetes  Family / Caregiver Present: No  Referring Practitioner: Dr Amador Zamora  Diagnosis: L distal femur fracture  Subjective  Subjective: Pt sitting on BSC urinating with PT and PCA present, pleasant and agreeable to OT/PT cotx.     Social/Functional History  Social/Functional History  Lives With: Spouse  Type of Home: House Home Layout: Able to Live on Main level with bedroom/bathroom  Home Access: Stairs to enter without rails  Entrance Stairs - Number of Steps: 2  Bathroom Shower/Tub: Walk-in shower  Bathroom Toilet: Standard  Bathroom Equipment: Toilet raiser  Bathroom Accessibility: Walker accessible  Home Equipment: Rolling walker, Cane  Receives Help From: Family, Home health  ADL Assistance: Independent  Homemaking Assistance: Needs assistance  Ambulation Assistance: Independent  Transfer Assistance: Independent  Active : Yes  Mode of Transportation: Car  Education: Completed 11th grade and then got   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  Leisure & Hobbies: Elsa  Additional Comments: only 1 fall this past year       Objective        Orientation  Overall Orientation Status: Within Functional Limits  Observation/Palpation  Observation: L LE in KI  Edema: Edema noted in RLE and foot  Scar: s/p R TKR with scar well healed  Balance  Sitting Balance: Supervision(seated on BSC and TTB)  Standing Balance: Minimal assistance(with BUE supported at GB/RW)  Standing Balance  Time: less than 1 minute  Activity: functional transfers; LB bathing/dressing  Comment: Pt non compliant with TTWB despite VCs and placement of foot on therapist shoe to monitor; pt c/o fatigue in [id number] in stance  Functional Mobility  Functional Mobility Comments: not assessed d/t time constraints  Toilet Transfers  Toilet - Technique: Stand pivot  Equipment Used: Standard bedside commode  Toilet Transfer: 2 Person assistance;Dependent/Total  Toilet Transfers Comments: Mod A x 2; assist more for pivoting and to maintain TTWB/mgmt of LLE vs physically assisting with STS  Tub Transfers  Tub - Transfer From: Wheelchair  Tub - Transfer Type: To and From  Tub - Transfer To: Transfer tub bench  Tub - Technique: Lateral  Tub Transfers:  Moderate assistance Comment: pt reports decreased hand strength gradually getting worse making it difficult for her to clasp and unbutton with dressing as well as opening bottles and containers        Transfers  Sit to stand: Minimal assistance(from TTB with BUE on GBs)  Stand to sit: Minimal assistance(to control descent)  Vision - Basic Assessment  Prior Vision: Wears glasses only for reading  Cognition  Overall Cognitive Status: WFL        Sensation  Overall Sensation Status: WNL           LUE Strength  L Hand General: 4-/5  RUE Strength  R Hand General: 4-/5  L Fingers  Left Finger Strength Comment: decreased gross grasp strength  R Fingers  Right Finger Strength Comment: decreased gross grasp strength  Hand Dominance  Hand Dominance: Right       Second Session:  Pt seated in wc upon entry, very pleasant and reports pain of \"7, LLE, RLE and back. The nurse already gave me my medicine. \" Pt stating, \"I don't want to cry, but I've been  to my  for 60 years and this is the first Holly we won't spend together. \" Pt  mobility Supervision in room ~15 ft. Pt sit pivot to EOB Max A + Min A. Pt sit EOB SBA. Pt scooting SBA/CGA. Pt sit to supine Min A. Pt rolling left/right Max A, bed flat no bed rail. Pt supine to sit Mod A. Pt sit EOB SBA. Pt sit to supine CGA 2nd trial. Pt with use of bed rails pulled self towards HOB.  Call light in reach and bed alarm on.          Plan   Plan  Times per week: 90 minutes per day 5 days/week  Times per day: Daily  Current Treatment Recommendations: Strengthening, Patient/Caregiver Education & Training, Home Management Training, Equipment Evaluation, Education, & procurement, Balance Training, Functional Mobility Training, Endurance Training, Safety Education & Training, Self-Care / ADL, Wheelchair Mobility Training    G-Code     OutComes Score                                                  AM-PAC Score             Goals  Short term goals  Time Frame for Short term goals: STG=LTG Long term goals  Time Frame for Long term goals : 2 weeks  Long term goal 1: Pt will complete LB dressing with use of AE prn with spvn  Long term goal 2: Pt will complete UB dressing with mod I  Long term goal 3: Pt will complete bathing with spvn  Long term goal 4: Pt will complete toilet transfer with spvn maintaining weightbearing restrictions  Long term goal 5: Pt will complete toileting with spvn  Patient Goals   Patient goals :  To be more independent       Therapy Time   Individual Concurrent Group Co-treatment   Time In 0953     0845   Time Out 1013     0953   Minutes 20     68   Timed Code Treatment Minutes: 88 Minutes     Second Session Therapy Time:    Individual Concurrent Group Co-treatment   Time In     1345   Time Out     1415   Minutes     30      Timed Code Treatment Minutes:  88 + 60=971     [facility], [address] [facility], [place]/ [id number] (Second session only)

## 2020-12-24 NOTE — PROGRESS NOTES
Physical Therapy    Facility/Department: [facility] ACUTE REHAB UNIT  Initial Assessment/ Daily treatment note    NAME: Sage Licona  : 1945  MRN: [id number]    Date of Service: 2020    Discharge Recommendations:  Home with assist PRN, Patient would benefit from continued therapy after discharge, Home with Home health PT   PT Equipment Recommendations  Other: Ongoing assessment at this time    Assessment   Body structures, Functions, Activity limitations: Decreased functional mobility ; Decreased ADL status; Decreased endurance;Decreased strength;Decreased balance; Increased pain  Assessment: Pt is a 70YO female s/p a fall resulting in a L distal femur fx impacting overall functional mobility including transf, walking and standing balance with WB limitations on the LLE with continual use of the KI . Pt req assist x 2 for functional transf on this date. Secondary to using the bathroom, taking a shower and the dressing and undressing associated with the shower, assessment of bed mobility, w/c and tranf was not completed in am session. Pt is well motivated to return home ASAP. Pt is well below baseline and would benefit from continued therapy to maximize potential and increase functional mobility towards Ind to allow for a safer d/c to home. 2nd Session: Pt is very pleasant and willing to work hard but limited by RLE weakness and LLE NWB restrictions. Propelled a 20\" cassia height w/c for approx 15 ft making turns in tight spaces and positioning the w/c in order to transfer back to bed; requiring cues and min assist. Pt is able to lock and unlock both brakes at a supervision level; but required max assist to burke and doff leg rest and to doff armrest. Squat pivot transfers to the right are very unsteady and require Max assist x1 + Min assist to guide hips. Sit to supine: CGA to min assist; Supine to sit: Mod assist. Rolling left to right required mod assist. Pt was left in bed as pt has been up in the chair since 6am.    Treatment Diagnosis: Decreased functional mobility 2/2 to distal femur fx  Prognosis: Good  Decision Making: Medium Complexity  PT Education: Goals;Transfer Training;PT Role;Energy Conservation; Functional Mobility Training;Plan of Care;General Safety;Weight-bearing Education; Adaptive Device Training;Precautions;Gait Training  Patient Education: Needs reinforcement of WB precautions  Barriers to Learning: None  REQUIRES PT FOLLOW UP: Yes       Patient Diagnosis(es): There were no encounter diagnoses. has a past medical history of Allergic, Anemia, Arthritis, Chronic kidney disease, Depression, Diabetes mellitus ([address] Disorders affecting multiple structures of eye, Epigastric pain, GERD (gastroesophageal reflux disease), Hyperlipidemia, Hypertension, IBS (irritable bowel syndrome), Intermittent atrial fibrillation ([address] Mass of lung, Migraine, Nausea & vomiting, Status post lung surgery, and Thyroid disease. has a past surgical history that includes brain surgery; joint replacement; Cholecystectomy; Bunionectomy; Hysterectomy; thoracotomy (3/10/11); Endoscopy, colon, diagnostic (8/22/2011); Appendectomy; colectomy; Cataract removal (2012); and knee surgery (Left, 10/11/2016).     Restrictions  Restrictions/Precautions Scar: s/p R TKR with scar well healed    AROM RLE (degrees)  RLE AROM: WFL  AROM LLE (degrees)  LLE General AROM: Limited by L KI  intact. L ankle WFL. ( Pt apprehensive to move LLE 2/2 to pain and discomfort  Strength RLE  Strength RLE: WFL  Strength LLE  Comment: Limited throughout with inability to nilam full range L hip. L knee limited by immobilizer     Sensation  Overall Sensation Status: WNL  Bed mobility  Comment: Bed mobility NT yet 2/2 to pt being up in chair when PT arrived. 2/2 to times restraints with showering and bathroom unable to assess with initial session  Transfers  Sit to Stand: Minimal Assistance(STS with min A from w/c and shwoer chair for LB clothing management. . Pt used the GB to assist.)  Stand to sit: Minimal Assistance(Req A with maneuvering the LLE when descending back into chair)  Stand Pivot Transfers: (Attempted SPT from [employer] chair to a bedside commode. Req Mod A x1 and min A x 1. Pt able to stand fairly well but unable to pivot effectively req more A)  Squat Pivot Transfers: (w/c<>shower chair req mod A when leading with L side and min A when leading with the R.( shower chair sat higher than the w/c also enabling an easier transf))  Comment: Pt consistently was unable to maintain WB precautions on LLE .  Pt moves very slowly and with increased apprehesion 2/2 to fear of falling  Ambulation  Ambulation?: No(Pt unable to maintain precautions with the LLE when transferring,)  WB Status: LLE with TTWB/ Foot flat WB rstrictions per chart review  Stairs/Curb  Stairs?: No  Wheelchair Activities  Wheelchair Size: Switched pt to a 20\" cassia height w/c  Level of Assistance for pressure relief activities: Modified independent  Wheelchair Parts Management: No  Propulsion: No     Balance  Sitting - Static: Good  Sitting - Dynamic: Fair;+(AS evidenced by ability to WS in all directions noted with showering and dressing) Standing - Static: Fair;-(With use of RW and the GB in the shower, but pt unable to maintain WB precautions)        Plan   Plan  Times per week: 5-7 x week x 90 minutes  Times per day: Twice a day  Current Treatment Recommendations: Strengthening, Transfer Training, Endurance Training, Patient/Caregiver Education & Training, Wheelchair Mobility Training, Pain Management, Balance Training, Gait Training, Functional Mobility Training, Stair training, Safety Education & Training, Positioning  Safety Devices  Type of devices: (Pt left in bathroom under the Supervision of OT)    G-Code       OutComes Score                                                  AM-PAC Score             Goals  Short term goals  Time Frame for Short term goals: 10 -14 days  Short term goal 1: Ind with bed mobility  Short term goal 2: MI with transf with  LRAD maintaining WB precautions  Short term goal 3: Pt able to propel w/c  on level surfaces x 300' at a time  Short term goal 4: Pt amb with RW ~20' at a time while maintaining WB precautions as outlined by physician  Short term goal 5: Pt to amb up and down 2 steps with AD with WB precautions as outlined by physician with CGA  Long term goals  Time Frame for Long term goals : STG= LTG  Patient Goals   Patient goals : Get home ASAP.  Also \"not to fall again       Therapy Time   Individual Concurrent Group Co-treatment   Time In 0830     0845   Time Out 0845     953   Minutes 15     68         Second Session Therapy Time:   Individual Concurrent Group Co-treatment   Time In      1345   Time Out      1415   Minutes      30     Timed Code Treatment Minutes:  30    Total Treatment Minutes:  [address] , PT

## 2020-12-24 NOTE — PLAN OF CARE
Problem: Pain:  Goal: Control of acute pain  Description: Control of acute pain  Outcome: Ongoing  Note: Pt's pain is controlled with prn narcotics. Problem: Falls - Risk of:  Goal: Will remain free from falls  Description: Will remain free from falls  Outcome: Ongoing  Note: Pt has remained free from falls during shift. Call light and belongings within reach. Bed alarm is set, wheels locked and in lowest position. Room free of clutter     Problem: Skin Integrity:  Goal: Will show no infection signs and symptoms  Description: Will show no infection signs and symptoms  Outcome: Ongoing  Note: No s/s of infection. Skin is clean, dry and intact. Surgical incision shows no redness, swelling, or warmth.

## 2020-12-24 NOTE — PROGRESS NOTES
Occupational Therapy  Idalia Escobar    Pt seated in wc upon entry, very pleasant and reports pain of \"7, LLE, RLE and back. The nurse already gave me my medicine. \" Pt stating, \"I don't want to cry, but I've been  to my  for 60 years and this is the first Holly we won't spend together. \" Pt wc mobility Supervision in room ~15 ft. Pt sit pivot to EOB Max A + Min A. Pt sit EOB SBA. Pt scooting SBA/CGA. Pt sit to supine Min A. Pt rolling left/right Max A, bed flat no bed rail. Pt supine to sit Mod A. Pt sit EOB SBA. Pt sit to supine CGA 2nd trial. Pt with use of bed rails pulled self towards HOB. Call light in reach and bed alarm on.     Second Session Therapy Time:   Individual Concurrent Group Co-treatment   Time In      1345   Time Out      1415   Minutes      30     Timed Code Treatment Minutes:  [phone]

## 2020-12-24 NOTE — CARE COORDINATION
4. Cost of Rx cannot be added to hospital bill. If financial assistance is needed, please contact unit  or ;  or  CANNOT provide pharmacy voucher for patients co-pays  5. Patients can then  the prescription on their way out of the hospital at discharge, or pharmacy can deliver to the bedside if staff is available. (payment due at time of pick-up or delivery - cash, check, or card accepted)     Able to afford home medications/ co-pay costs: Yes    ADLS  Support Systems: Spouse/Significant Other, Children    PT AM-PAC:   /24  OT AM-PAC:   /24    [place]: lives at home with spouse  Steps:     Plans to RETURN to current housing: Yes  Barriers to RETURNING to current housing: none at this time    Cecilia [address]bs 76  Currently ACTIVE with [address]: No  Home Care Agency: Not Applicable    Currently ACTIVE with [place] on Aging: Yes Mik olson Pt  Passport/ Waiver: No  Passport/ Waiver Services: Maury Day  [facility] Provider  Equipment: has a walker at home    Home Oxygen and [address] prior to admission: No  [address]: Not Applicable  Other Respiratory Equipment:     Informed of need to bring portable home O2 tank on day of DISCHARGE for nursing to connect prior to leaving: No  Verbalized agreement/Understanding: No  Person to bring portable tank at discharge:     Dialysis  Active with HD/PD prior to admission: No  Nephrologist:    HD Center:  Not Applicable    DISCHARGE PLAN:  Disposition: Home with [address]: TBD     Transportation PLAN for discharge: family     Factors facilitating achievement of predicted outcomes: Family support, Cooperative and Pleasant    Barriers to discharge: none at this time.     Additional Case Management Notes: SW met with Pt at bedside. Pt admitted to [address] from [facility]. Pt states she lives at home with her spouse and her dgt, who is a RN, lives next door. Pt also has 2 other dgt's who live locally. Pt states PTA, independent with all ADL's and able to drive. No skilled Home Care but does receive homemaking services through [employer] on Aging. Pt plans to return home with spouse. The Plan for Transition of Care is related to the following treatment goals of Hip fracture requiring operative repair, left, closed, initial encounter [facility]) Judith Mort    The Patient and/or patient representative Fidel Kang and her family were provided with a choice of provider and agrees with the discharge plan Yes    Freedom of choice list was provided with basic dialogue that supports the patient's individualized plan of care/goals and shares the quality data associated with the providers.  Yes    Care Transition patient: No    Barbie Parker, [address]  Case Management Department  Ph: [phone]

## 2020-12-24 NOTE — PROGRESS NOTES
Shift assessment complete. VSS, A/Ox4, fall precautions in place. Call light in reach. Pt c/o pain in her right leg and her back. Pt has not c/o any pain in the broken LLE. Was given prn pain medication.  Will continue to monitor       Vitals:    12/23/20 2030   BP: 130/61   Pulse: 100   Resp: 18   Temp: 98.8 °F (37.1 °C)   SpO2: 92%

## 2020-12-24 NOTE — PROGRESS NOTES
Pt has an allergy to Vicodin (Hydrocodone-acetaminophen). Pt also has an order for Norco (Hydrocodone-acetaminophen). Pt stated that she was okay to take it b/c she had it earlier 12/23/20 before she left the hospital. She had no reactions to the medication and pain level decreased. Will cont to monitor.

## 2020-12-24 NOTE — H&P
Department of Physical Medicine & Rehabilitation  History & Physical      Patient Identification:  Yazmin Montes De Oca  : 1945  Admit date: 2020   Attending provider: Vahid Pitts DO        Primary care provider: Shirley Dean MD     Chief Complaint: Left femur fracture     History of Present Illness/Hospital Course: 66year old matteo who fell in her bathroom yesterday morning when her right leg gave out causing her to fall jackson her left side. She had immediate pain in left leg and inability to ambulate so was brought to [facility] ED by EMS. Imaging revealed left distal femur fracture. PMH significant for chronic back pain, bilateral total knee arthroplasties, & Diabetes. She has chronic right leg pain due to documented L4-5 lumbar stenosis. She is followed by Dr. Lobo Galloway for iron deficiency anemia. She has a history of heterozygous factor V Leiden discovered at time of PE in 2015, was fully anticoagulated for 1 yr, then placed on ASA, but had reaction. She was NOT on any anticoagulation prior to admission. Received a dose or 2 of heparin pre-op. ORIF of the left femur was completed 12/20/2020. Post-op complications include UTI, anemia, and shooting/burning pain involving her right calf. Patient believes this pain is associated with her neuropathic pain from her lumbar stenosis. MD ordered doppler since patient has a hx of DVT. She was due for B12 injection in office next week but hematology decided to administer B 12 on 12/23/20 prior to transfer to ARU. Patient has remained on prophylactic Eliquis and venous ultrasound was unremarkable. On the day of DC to ARU she received 1 IV iron sucrose 300 mg prior to transfer. She is planned to receive 2 more doses at rehab. Patient was evaluated by PT/OT and is currently functioning below her baseline. Prior to admission she was independent with all adls, iadls, and functional mobility without and AD. She is now requiring assist x 2 for functional mobility. Patient is motivated and able to tolerate 3 hrs of therapy per day in inpatient rehab. She is now medically stable for transfer.     Prior Level of Function:  Independent for mobility, ADLs, and IADLs    Current Level of Function:  Mod assist       Past Medical History:   Diagnosis Date    Allergic     Anemia     Arthritis  Chronic kidney disease     Depression     Diabetes mellitus (HCC)     Disorders affecting multiple structures of eye     Epigastric pain     GERD (gastroesophageal reflux disease)     Hyperlipidemia     Hypertension     IBS (irritable bowel syndrome)     Intermittent atrial fibrillation ([facility] [address] 3/12/2011    Mass of lung     right    Migraine     Nausea & vomiting     Status post lung surgery 3/10/2011    Thyroid disease      Fall in past year: YES    Past Surgical History:   Procedure Laterality Date    APPENDECTOMY      BRAIN SURGERY      BUNIONECTOMY      CATARACT REMOVAL  2012    CHOLECYSTECTOMY      COLECTOMY      ENDOSCOPY, COLON, DIAGNOSTIC  8/22/2011    HYSTERECTOMY      JOINT REPLACEMENT      KNEE SURGERY Left 10/11/2016    THORACOTOMY  3/10/11    right thoracotomy;right video assissted thoroscopy biopsey of mediastinal mass for ffrozen  right lower lobe wedge resection for frozen; thymusectomy     Major Surgery in past 100 days: YES    Family History   Problem Relation Age of Onset    Elevated Lipids Mother     Coronary Art Dis Mother    [facility] Migraines Mother     Hypertension Mother     Kidney Disease Mother     Diabetes Mother     Stroke Father     Hypertension Father     Cancer Sister         ovarian and breast    Coronary Art Dis Brother     Diabetes Brother     Kidney Disease Sister     Hypertension Sister     Hypertension Brother     Diabetes Sister     Migraines Sister     Migraines Brother     Alcohol Abuse Brother        Social History     Socioeconomic History    Marital status:      Spouse name: Not on file    Number of children: Not on file    Years of education: Not on file    Highest education level: Not on file   Occupational History    Not on file   Social Needs    Financial resource strain: Not on file    Food insecurity     Worry: Not on file     Inability: Not on file    Transportation needs     Medical: Not on file  traMADol (ULTRAM) tablet 50 mg  50 mg Oral Q6H PRN Tiburcio LESLIE Heis, DO        ALPRAZolam Stephanie Robson) tablet 0.5 mg  0.5 mg Oral BID PRN Alfredito Nguyenis, DO   0.5 mg at 12/23/20 2303    clopidogrel (PLAVIX) tablet 75 mg  75 mg Oral Daily Tiburcio LESLIE Heis, DO   75 mg at 12/24/20 0818    cholestyramine light packet 4 g  4 g Oral Daily Tiburcio L Heis, DO   4 g at 12/24/20 0818    vitamin B-12 (CYANOCOBALAMIN) tablet 1,000 mcg  1,000 mcg Oral Daily Tiburcio LESLIE Heis, DO        diclofenac sodium (VOLTAREN) 1 % gel 2 g  2 g Topical BID Tiburcio Nguyenis, DO   2 g at 12/24/20 0820    [START ON 12/27/2020] vitamin D (ERGOCALCIFEROL) capsule 50,000 Units  50,000 Units Oral Weekly Tiburcio Rai, DO        estradiol (ESTRACE) vaginal cream 2 g  2 g Vaginal Once per day on Mon Wed Fri Tiburcio Rai, DO        diphenoxylate-atropine (LOMOTIL) 2.5-0.025 MG per tablet 1 tablet  1 tablet Oral 4x Daily PRN Tiburcio Nguyenis, DO        gabapentin (NEURONTIN) capsule 300 mg  300 mg Oral TID Tiburcio Nguyenis, DO   300 mg at 12/24/20 0817    hydroCHLOROthiazide (HYDRODIURIL) tablet 25 mg  25 mg Oral Daily Tiburcio LESLIE Heis, DO   25 mg at 12/24/20 0818    hyoscyamine (LEVBID) extended release tablet 375 mcg  375 mcg Oral Q12H PRN Alfredito Nguyenis, DO   375 mcg at 12/24/20 8847    metoprolol succinate (TOPROL XL) extended release tablet 50 mg  50 mg Oral Daily Tiburcio Nguyenis, DO        levothyroxine (SYNTHROID) tablet 125 mcg  125 mcg Oral Daily Tiburcio LESLIE Heis, DO   125 mcg at 12/24/20 0237    pantoprazole (PROTONIX) tablet 40 mg  40 mg Oral QAM AC Tiburcio Nguyenis, DO   40 mg at 12/24/20 4606    nitroGLYCERIN (NITROSTAT) SL tablet 0.4 mg  0.4 mg Sublingual Q5 Min PRN Tiburcio Rai DO        pravastatin (PRAVACHOL) tablet 20 mg  20 mg Oral Nightly Tiburcio Rai DO   20 mg at 12/23/20 2046    sertraline (ZOLOFT) tablet 100 mg  100 mg Oral Daily Tiburcio Rai DO   100 mg at 12/24/20 2738  tiZANidine (ZANAFLEX) tablet 2 mg  2 mg Oral Q8H PRN Tremaine Marshall Heis, DO   2 mg at 12/23/20 2047    acetaminophen (TYLENOL) tablet 650 mg  650 mg Oral Q4H PRN Tiburcio L Heis, DO        bisacodyl (DULCOLAX) EC tablet 5 mg  5 mg Oral Daily Tiburcio L Heis, DO        magnesium hydroxide (MILK OF MAGNESIA) 400 MG/5ML suspension 30 mL  30 mL Oral Daily PRN Tiburcio L Heis, DO        polyethylene glycol (GLYCOLAX) packet 17 g  17 g Oral Daily PRN Tiburcio L Heis, DO        glucose (GLUTOSE) 40 % oral gel 15 g  15 g Oral PRN Tiburcio L Heis, DO        dextrose 50 % IV solution  12.5 g Intravenous PRN Tiburcio L Heis, DO        glucagon (rDNA) injection 1 mg  1 mg Intramuscular PRN Tiburcio L Heis, DO        dextrose 5 % solution  100 mL/hr Intravenous PRN Tiburcio L Heis, DO             REVIEW OF SYSTEMS:   CONSTITUTIONAL: negative for fevers, chills, diaphoresis, appetite change, night sweats, unexpected weight change, +fatigue. EYES: negative for blurred vision, eye discharge, visual disturbance and icterus. HEENT: negative for hearing loss, tinnitus, ear drainage, sinus pressure, nasal congestion, epistaxis and snoring. RESPIRATORY: Negative for hemoptysis, cough, sputum production. CARDIOVASCULAR: negative for chest pain, palpitations, exertional chest pressure/discomfort, syncope, edema   GASTROINTESTINAL: negative for nausea, vomiting, diarrhea, blood in stool, abdominal pain, constipation. GENITOURINARY: negative for frequency, dysuria, urinary incontinence, decreased urine volume, and hematuria. HEMATOLOGIC/LYMPHATIC: negative for easy bruising, bleeding and lymphadenopathy. ALLERGIC/IMMUNOLOGIC: negative for recurrent infections, angioedema, anaphylaxis and drug reactions. ENDOCRINE: negative for weight changes and diabetic symptoms including polyuria, polydipsia and polyphagia. MUSCULOSKELETAL: positive for joint swelling, decreased range of motion. NEUROLOGICAL: negative for headaches, slurred speech, unilateral weakness. Positive for bilateral LE weakness- positive for bilateral LE pain  PSYCHIATRIC/BEHAVIORAL: negative for hallucinations, behavioral problems, confusion and agitation. All pertinent positives are noted in the HPI. Physical Examination:  Vitals:   Patient Vitals for the past 24 hrs:   BP Temp Temp src Pulse Resp SpO2 Height Weight   12/23/20 2302       5' 2\" (1.575 m) 182 lb (82.6 kg)   12/23/20 2030 130/61 98.8 °F (37.1 °C) Oral 100 18 92 %     12/23/20 1846 (!) 150/77 98.1 °F (36.7 °C) Oral 97 18 98 %         Const: Alert. WDWN. No distress  Eyes: Conjunctiva noninjected, no icterus noted; pupils equal, round, and reactive to light. HENT: Atraumatic, normocephalic; Oral mucosa moist  Neck: Trachea midline, neck supple. No thyromegaly noted. CV: Regular rate and rhythm, no murmur rub or gallop noted  Resp: Lungs clear to auscultation bilaterally, no rales wheezes or ronchi, no retractions. Respirations unlabored. GI: Soft, nontender, nondistended. Normal bowel sounds. No palpable masses. Skin: Normal temperature and turgor. No rashes or breakdown noted. Ext: No significant edema appreciated. No varicosities. MSK: No joint tenderness, erythema, warmth noted. AROM intact. Neuro:   -Mental status: Alert. Oriented to person, place, time, situation. 3 word immediate and delayed recall (sock, bed, blue) intact. Attention intact (months of year in reverse). -Language: Speech fluent, repetition and naming intact  -Cranial nerves: VFF, PERRL, EOMI, Facial sensation intact, Face symmetric, Hearing intact, Palate elevation symmetric, Shoulder shrug intact. Tongue midline.   -Sensation intact to light touch. -Motor examination reveals normal strength in bilateral UE with decreased 4/5 strength in right LE and untested strength in LLE due to brace.    Psych: Stable mood, normal judgement, normal affect     Lab Results Component Value Date    WBC 6.6 07/17/2017    HGB 11.1 (L) 07/17/2017    HCT 36.8 (L) 07/17/2017    MCV 95.1 07/17/2017     07/17/2017     No results found for: INR, PROTIME  Lab Results   Component Value Date    CREATININE 1.02 10/24/2016    BUN 21 (H) 10/24/2016     (L) 10/24/2016    K 4.6 10/24/2016    CL 94 (L) 10/24/2016    CO2 25 10/24/2016     Lab Results   Component Value Date    ALT 17 10/24/2016    AST 22 10/24/2016    ALKPHOS 125 10/24/2016    BILITOT 0.4 10/24/2016         No orders to display     Mri Lumbar Spine Wo Con     Result Date: 12/17/2020  L4-5 circumferential disc bulge with a right synovial facet cyst in the posterior right epidural space resulting in severe stenosis of the right subarticular zone/lateral recess, with mass effect on the descending right nerve roots. Xr Chest Ap Portable     Result Date: 12/20/2020  No acute abnormality      Xr Femur Left Ap And Lateral     Result Date: 12/21/2020  No unexpected findings on limited intraoperative radiographic exam       Xr Femur Left Ap And Lateral     Result Date: 12/20/2020   Distal femoral shaft spiral fracture with mild displacement       Xr Hip Left Ap And Lat W Or Wo Pelvis     Result Date: 12/20/2020   Limited evaluation of left hip. Consider cross-sectional imaging if patient is nonweightbearing       Ct Hip Left Wo Contrast    The above laboratory data have been reviewed. The above imaging data have been reviewed. The above medical testing have been reviewed. Body mass index is 33.29 kg/m². POST ADMISSION PHYSICIAN EVALUATION  The patient has agreed to being admitted to our comprehensive inpatient rehabilitation facility and can tolerate the intensity of service consisting of at least:  --180 minutes of therapy a day, 5 out of 7 days a week. OR  --15 hours of intensive therapy within a 7 consecutive day period. The patient/family has a good understanding of our discharge process and will benefit from an interdisciplinary inpatient rehabilitation program. The patient has potential to make improvement and is in need of at least two of the following multidisciplinary therapies including but not limited to physical, occupational, respiratory, and speech, nutritional services, wound care, and prosthetics and orthotics. Given the patients complex condition and risk of further medical complications, rehabilitation services cannot be safely provided at a lower level of care such as a skilled nursing facility. All of the goals listed below were reviewed with the patient and he/she is in agreement. I have compared the patients medical and functional status at the time of the preadmission screening and the same on this date, and there are no significant changes. By signing this document, I acknowledge that I have personally performed a full physical examination on this patient within 24 hours of admission to this inpatient rehabilitation facility and have determined the patient to be able to tolerate the above course of treatment at an intensive level for a reasonable period of time. I will be completing a detailed individualized  Plan of Care for this patient by day four of the patients stay based upon the Preadmission Screen, this Post-Admission Evaluation, and the therapy evaluations. Barriers: Decreased functional mobility, medical comorbidities  Services Required: PT, OT  Goals: mod I  Prognosis: Good  Anticipated Dispo: home  ELOS: TBD    Rehabilitation Diagnosis:   Orthopedic, 8.11, Unilateral Hip Fracture      Assessment and Plan:   Ms. Mary Valladares will require ongoing medical management and daily physician oversight for the followin. Left femur fracture. Test post ORIF 2020.  Patient will proceed with treatment per orthopedic protocol. - PT/OT required 2. Diabetes. Patient will continue with bolus correction regimen. Adjust as required once oral take is increased. 3. Coagulopathy. Patient has a history of heterozygous factor V Leiden deficiency. Patient has started Eliquis 2.5 mg by mouth twice a day per hematology recommendations. 4. Coronary artery disease. Clopidogrel has been restarted. 5. UTI. Patient's preprocedure urinalysis is consistent with urinary tract infection, greater than 100,000 CFU Klebsiella pneumonia. She continues on 1 g IV daily pending culture results. 6. Normocytic anemia. Hemoglobin on admission was 11.8. It has decreased to 8.7 on 12/22 and 7.3 on 12/23. Continue to monitor. Impairments: Decreased functional mobility, Decreased ADLs    Bladder - high risk retention - Monitor PVRs, SC prn >300cc    Bowel - high risk constipation - colace BID, PRN miralax and MoM. follow bowel movements. Enema or suppository if needed.      Safety - fall precautions    PPx  DVT: Eliquis   GI: pepcid     FULL CODE    Scott Rios D.O. M.P.H  PM&R  12/24/2020  9:54 AM

## 2020-12-24 NOTE — PROGRESS NOTES
NUTRITION ASSESSMENT  Admission Date: 12/23/2020     Type and Reason for Visit: Initial    NUTRITION RECOMMENDATIONS:   1. PO Diet: Continue current CC5 diet    2. ONS: Provided per family as PRN. NUTRITION ASSESSMENT:  New ARU admission. PMHx of CAD, T2DM (no recent hgb A1C on file) and Anemia. Admitted for Left femur fracture, s/p ortho Sx on 12/21. No weight Hx data per EMR. Pt indicated minimal po intake since injury r/t ++ pain. Pt prefers to have ONS provided by family and will have as PRN. RD will continue to monitor per [facility]. MALNUTRITION ASSESSMENT  Context of Malnutrition: Acute Illness   Malnutrition Status: At risk for malnutrition (Comment)  Findings of the 6 clinical characteristics of malnutrition (Minimum of 2 out of 6 clinical characteristics is required to make the diagnosis of moderate or severe Protein Calorie Malnutrition based on AND/ASPEN Guidelines):  Energy Intake: Less than/equal to 50% of estimated energy requirements    Energy Intake Time: x3 days   Weight Loss %: Unable to assess    Weight loss Time: Greater than or equal to 1 year   Due to current CDC guidelines recommending 6-ft distancing for social isolation for COVID19 prevention, physical aspects of the malnutrition assessment were withheld at this time.      NUTRITION DIAGNOSIS   Problem: Problem #1: Inadequate oral intake  Etiology: Decreased ability to consume sufficient energy   Signs & Symptoms: Diet history of poor intake  and Intake 0-25%    NUTRITION INTERVENTION  Food and/or Nutrient Delivery:Continue Current diet  or Continue Current ONS   Nutrition education/counseling/coordination of care: Continue Inpatient Monitoring     NUTRITION MONITORING & EVALUATION:  Evaluation:Goals set   Goals:Goals: Pt to meet >50% of nutritional requirements from diet and ONS  Monitoring: Meal Intake , Pertinent Labs  or Supplement Intake      OBJECTIVE DATA: · Nutrition-Focused Physical Findings: No edema. No BM noted yet. ++ pain bilaterally   · Wounds Surgical Wound      Past Medical History:   Diagnosis Date    Allergic     Anemia     Arthritis     Chronic kidney disease     Depression     Diabetes mellitus (HCC)     Disorders affecting multiple structures of eye     Epigastric pain     GERD (gastroesophageal reflux disease)     Hyperlipidemia     Hypertension     IBS (irritable bowel syndrome)     Intermittent atrial fibrillation ([facility] [address] 3/12/2011    Mass of lung     right    Migraine     Nausea & vomiting     Status post lung surgery 3/10/2011    Thyroid disease         ANTHROPOMETRICS  Current Height: 5' 2\" (157.5 cm)  Current Weight: 182 lb (82.6 kg)(per pt)    Admission weight: 182 lb (82.6 kg)(per pt)  Ideal Bodyweight 110 lb/ 50 kg   Usual Bodyweight JOANIE   Adjusted Bodyweight n/a  Weight Changes JOANIE       BMI BMI (Calculated): 33.4    Wt Readings from Last 50 Encounters:   12/23/20 182 lb (82.6 kg)   08/14/17 166 lb (75.3 kg)       COMPARATIVE STANDARDS  Estimated Total Kcals/Day : 15-18 Current Bodyweight (82.6 kg) 0376-4597 kcal    Estimated Total Protein (g/day) : 1.2-1.4 Ideal Bodyweight  (50 kg) 60-70 g/day  Estimated Daily Total Fluid (ml/day): >1500 mL per day     Food / Nutrition-Related History  Pre-Admission / Home Diet:  Pre-Admission/Home Diet: General   Home Supplements / Herbals:    none noted  Food Restrictions / Cultural Requests:    none noted    Current Nutrition Therapies   DIET CARB CONTROL; Carb Control: 5 carb choices (75 gms)/meal     PO Intake: 1-25%  PO Supplement: From home   PO Supplement Intake: %  IVF: none    NUTRITION RISK LEVEL: Risk Level:  Moderate     Aftab Stern, [address] [place] [address]  [place]:  [phone]  Office:  [phone]

## 2020-12-24 NOTE — PLAN OF CARE
Nutrition Problem #1: Inadequate oral intake  Intervention: Food and/or Nutrient Delivery: Continue Current Diet, Continue Oral Nutrition Supplement  Nutritional Goals: Pt to meet >50% of nutritional requirements from diet and ONS

## 2020-12-25 LAB
ANION GAP SERPL CALCULATED.3IONS-SCNC: 12 MMOL/L (ref 3–16)
ANISOCYTOSIS: ABNORMAL
BANDED NEUTROPHILS RELATIVE PERCENT: 1 % (ref 0–7)
BASOPHILS ABSOLUTE: 0.1 K/UL (ref 0–0.2)
BASOPHILS RELATIVE PERCENT: 1 %
BUN BLDV-MCNC: 17 MG/DL (ref 7–20)
CALCIUM SERPL-MCNC: 9.4 MG/DL (ref 8.3–10.6)
CHLORIDE BLD-SCNC: 108 MMOL/L (ref 99–110)
CO2: 18 MMOL/L (ref 21–32)
CREAT SERPL-MCNC: 0.9 MG/DL (ref 0.6–1.2)
EOSINOPHILS ABSOLUTE: 0 K/UL (ref 0–0.6)
EOSINOPHILS RELATIVE PERCENT: 0 %
GFR AFRICAN AMERICAN: >60
GFR NON-AFRICAN AMERICAN: >60
GLUCOSE BLD-MCNC: 133 MG/DL (ref 70–99)
GLUCOSE BLD-MCNC: 147 MG/DL (ref 70–99)
GLUCOSE BLD-MCNC: 158 MG/DL (ref 70–99)
GLUCOSE BLD-MCNC: 165 MG/DL (ref 70–99)
GLUCOSE BLD-MCNC: 175 MG/DL (ref 70–99)
HCT VFR BLD CALC: 25.7 % (ref 36–48)
HEMOGLOBIN: 8.2 G/DL (ref 12–16)
LYMPHOCYTES ABSOLUTE: 2.2 K/UL (ref 1–5.1)
LYMPHOCYTES RELATIVE PERCENT: 20 %
MACROCYTES: ABNORMAL
MCH RBC QN AUTO: 29.5 PG (ref 26–34)
MCHC RBC AUTO-ENTMCNC: 32 G/DL (ref 31–36)
MCV RBC AUTO: 92.2 FL (ref 80–100)
METAMYELOCYTES RELATIVE PERCENT: 1 %
MONOCYTES ABSOLUTE: 1.7 K/UL (ref 0–1.3)
MONOCYTES RELATIVE PERCENT: 15 %
NEUTROPHILS ABSOLUTE: 7.1 K/UL (ref 1.7–7.7)
NEUTROPHILS RELATIVE PERCENT: 62 %
NUCLEATED RED BLOOD CELLS: 1 /100 WBC
OVALOCYTES: ABNORMAL
PDW BLD-RTO: 15.4 % (ref 12.4–15.4)
PERFORMED ON: ABNORMAL
PLATELET # BLD: 283 K/UL (ref 135–450)
PMV BLD AUTO: 9 FL (ref 5–10.5)
POLYCHROMASIA: ABNORMAL
POTASSIUM REFLEX MAGNESIUM: 4.9 MMOL/L (ref 3.5–5.1)
RBC # BLD: 2.79 M/UL (ref 4–5.2)
REASON FOR REJECTION: NORMAL
REJECTED TEST: NORMAL
SCHISTOCYTES: ABNORMAL
SODIUM BLD-SCNC: 138 MMOL/L (ref 136–145)
WBC # BLD: 11.1 K/UL (ref 4–11)

## 2020-12-25 PROCEDURE — 80048 BASIC METABOLIC PNL TOTAL CA: CPT

## 2020-12-25 PROCEDURE — 97530 THERAPEUTIC ACTIVITIES: CPT

## 2020-12-25 PROCEDURE — 1280000000 HC REHAB R&B

## 2020-12-25 PROCEDURE — 6370000000 HC RX 637 (ALT 250 FOR IP): Performed by: PHYSICAL MEDICINE & REHABILITATION

## 2020-12-25 PROCEDURE — 97110 THERAPEUTIC EXERCISES: CPT

## 2020-12-25 PROCEDURE — 36415 COLL VENOUS BLD VENIPUNCTURE: CPT

## 2020-12-25 PROCEDURE — 85025 COMPLETE CBC W/AUTO DIFF WBC: CPT

## 2020-12-25 PROCEDURE — 6370000000 HC RX 637 (ALT 250 FOR IP)

## 2020-12-25 PROCEDURE — 97535 SELF CARE MNGMENT TRAINING: CPT

## 2020-12-25 PROCEDURE — 97542 WHEELCHAIR MNGMENT TRAINING: CPT

## 2020-12-25 RX ORDER — BISACODYL 10 MG
10 SUPPOSITORY, RECTAL RECTAL DAILY PRN
Status: DISCONTINUED | OUTPATIENT
Start: 2020-12-25 | End: 2021-01-08 | Stop reason: HOSPADM

## 2020-12-25 RX ADMIN — HYDROCODONE BITARTRATE AND ACETAMINOPHEN 1 TABLET: 5; 325 TABLET ORAL at 15:32

## 2020-12-25 RX ADMIN — TRAMADOL HYDROCHLORIDE 50 MG: 50 TABLET, COATED ORAL at 23:54

## 2020-12-25 RX ADMIN — INSULIN LISPRO 1 UNITS: 100 INJECTION, SOLUTION INTRAVENOUS; SUBCUTANEOUS at 21:47

## 2020-12-25 RX ADMIN — ALPRAZOLAM 0.5 MG: 0.5 TABLET ORAL at 23:54

## 2020-12-25 RX ADMIN — HYDROCHLOROTHIAZIDE 25 MG: 25 TABLET ORAL at 08:06

## 2020-12-25 RX ADMIN — BENZOCAINE AND MENTHOL 1 LOZENGE: 15; 3.6 LOZENGE ORAL at 17:24

## 2020-12-25 RX ADMIN — CLOPIDOGREL BISULFATE 75 MG: 75 TABLET ORAL at 08:09

## 2020-12-25 RX ADMIN — GABAPENTIN 300 MG: 300 CAPSULE ORAL at 15:21

## 2020-12-25 RX ADMIN — DIPHENOXYLATE HYDROCHLORIDE AND ATROPINE SULFATE 1 TABLET: 2.5; .025 TABLET ORAL at 08:07

## 2020-12-25 RX ADMIN — DICLOFENAC 2 G: 10 GEL TOPICAL at 21:46

## 2020-12-25 RX ADMIN — GABAPENTIN 300 MG: 300 CAPSULE ORAL at 08:07

## 2020-12-25 RX ADMIN — GABAPENTIN 300 MG: 300 CAPSULE ORAL at 21:45

## 2020-12-25 RX ADMIN — METOPROLOL SUCCINATE 50 MG: 50 TABLET, EXTENDED RELEASE ORAL at 08:07

## 2020-12-25 RX ADMIN — HYDROCODONE BITARTRATE AND ACETAMINOPHEN 1 TABLET: 5; 325 TABLET ORAL at 01:09

## 2020-12-25 RX ADMIN — PANTOPRAZOLE SODIUM 40 MG: 40 TABLET, DELAYED RELEASE ORAL at 06:22

## 2020-12-25 RX ADMIN — INSULIN LISPRO 2 UNITS: 100 INJECTION, SOLUTION INTRAVENOUS; SUBCUTANEOUS at 17:24

## 2020-12-25 RX ADMIN — TRAMADOL HYDROCHLORIDE 50 MG: 50 TABLET, COATED ORAL at 08:06

## 2020-12-25 RX ADMIN — ALPRAZOLAM 0.5 MG: 0.5 TABLET ORAL at 08:16

## 2020-12-25 RX ADMIN — PRAVASTATIN SODIUM 10 MG: 10 TABLET ORAL at 21:45

## 2020-12-25 RX ADMIN — INSULIN LISPRO 2 UNITS: 100 INJECTION, SOLUTION INTRAVENOUS; SUBCUTANEOUS at 12:06

## 2020-12-25 RX ADMIN — APIXABAN 2.5 MG: 5 TABLET, FILM COATED ORAL at 21:45

## 2020-12-25 RX ADMIN — SERTRALINE HYDROCHLORIDE 100 MG: 100 TABLET ORAL at 08:07

## 2020-12-25 RX ADMIN — PRAVASTATIN SODIUM 10 MG: 10 TABLET ORAL at 01:09

## 2020-12-25 RX ADMIN — INSULIN LISPRO 2 UNITS: 100 INJECTION, SOLUTION INTRAVENOUS; SUBCUTANEOUS at 08:08

## 2020-12-25 RX ADMIN — LEVOTHYROXINE SODIUM 125 MCG: 0.03 TABLET ORAL at 06:22

## 2020-12-25 RX ADMIN — DICLOFENAC 2 G: 10 GEL TOPICAL at 12:06

## 2020-12-25 RX ADMIN — APIXABAN 2.5 MG: 5 TABLET, FILM COATED ORAL at 08:07

## 2020-12-25 ASSESSMENT — PAIN DESCRIPTION - PROGRESSION
CLINICAL_PROGRESSION: GRADUALLY IMPROVING

## 2020-12-25 ASSESSMENT — PAIN DESCRIPTION - PAIN TYPE
TYPE: SURGICAL PAIN
TYPE: SURGICAL PAIN

## 2020-12-25 ASSESSMENT — PAIN DESCRIPTION - ONSET: ONSET: ON-GOING

## 2020-12-25 ASSESSMENT — PAIN SCALES - GENERAL: PAINLEVEL_OUTOF10: 6

## 2020-12-25 ASSESSMENT — PAIN - FUNCTIONAL ASSESSMENT: PAIN_FUNCTIONAL_ASSESSMENT: PREVENTS OR INTERFERES SOME ACTIVE ACTIVITIES AND ADLS

## 2020-12-25 ASSESSMENT — PAIN DESCRIPTION - LOCATION: LOCATION: LEG

## 2020-12-25 ASSESSMENT — PAIN DESCRIPTION - ORIENTATION: ORIENTATION: LEFT

## 2020-12-25 NOTE — PROGRESS NOTES
Shift assessment complete. VSS. A/Ox4. Fall precautions in place. Having 6/10 leg pain. Medicated with tramadol. Assisted to bedside commode with 2 staff assist. Patient tolerated well. Call light in reach. Will continue to monitor.      Vitals:    12/25/20 0805   BP: (!) 164/70   Pulse: 100   Resp: 18   Temp: 98 °F (36.7 °C)   SpO2: 97%

## 2020-12-25 NOTE — PROGRESS NOTES
Physical Therapy  Facility/Department: [facility] ACUTE REHAB UNIT  Daily Treatment Note  NAME: Roz Fuentes  : 1945  MRN: [id number]    Date of Service: 2020    Discharge Recommendations:  Home with assist PRN, Patient would benefit from continued therapy after discharge, Home with Home health PT   PT Equipment Recommendations  Other: Ongoing assessment at this time    Assessment   Body structures, Functions, Activity limitations: Decreased functional mobility ; Decreased ADL status; Decreased endurance;Decreased strength;Decreased balance; Increased pain;Decreased ROM  Assessment: Pt demonstrates improved UE strength this date as evidenced by improved maneuvering of w/c, and ability to complete 8 reps w/c push ups with full clearance of buttocks. Pt does require increased time and rest breaks throughout session d/t low endurance. Pt also requires assist to maintain WB limitations on L LE with KI. Pt nilam LE therapeutic exercises well for strengthening of R LE to progress with transfers and eventually ambulation. Pt continues to be motivated throughout session and wishes to return home ASAP. Pt is functioning below baseline level and will continue to benefit from skilled physical therapy services to address limitations and maximize potential towards ind and safer d/c home. Treatment Diagnosis: Decreased functional mobility 2/2 to distal femur fx  Prognosis: Good  Decision Making: Medium Complexity  PT Education: Goals;Transfer Training;PT Role;Energy Conservation; Functional Mobility Training;Plan of Care;General Safety;Weight-bearing Education; Adaptive Device Training;Precautions;Gait Training  Barriers to Learning: None  REQUIRES PT FOLLOW UP: Yes     Patient Diagnosis(es): There were no encounter diagnoses. has a past medical history of Allergic, Anemia, Arthritis, Chronic kidney disease, Depression, Diabetes mellitus ([address] Disorders affecting multiple structures of eye, Epigastric pain, GERD (gastroesophageal reflux disease), Hyperlipidemia, Hypertension, IBS (irritable bowel syndrome), Intermittent atrial fibrillation ([address] Mass of lung, Migraine, Nausea & vomiting, Status post lung surgery, and Thyroid disease. has a past surgical history that includes brain surgery; joint replacement; Cholecystectomy; Bunionectomy; Hysterectomy; thoracotomy (3/10/11); Endoscopy, colon, diagnostic (8/22/2011); Appendectomy; colectomy; Cataract removal (2012); and knee surgery (Left, 10/11/2016). Restrictions  Restrictions/Precautions  Restrictions/Precautions: Weight Bearing  Required Braces or Orthoses?: Yes(L knee immobilizer)  Lower Extremity Weight Bearing Restrictions  Left Lower Extremity Weight Bearing: Foot Flat Weight Bearing(Both TTWB and foot flat WB restrictions are noted in chart)  Position Activity Restriction  Other position/activity restrictions: L TTWB and foot flat WB listed, L knee immobilizer on at all times  Subjective   General  Chart Reviewed:  Yes Additional Pertinent Hx: 67 YO F who fell in her bathroom when her right leg gave out causing her to fall onto her left side. Resulted in immediate pain in left leg and inability to ambulate. Brought to [facility] ED by EMS. Imaging revealed left distal femur fracture. PMH significant for chronic back pain, bilateral total knee arthroplasties, & Diabetes. She has chronic right leg pain due to documented L4-5 lumbar stenosis. She is followed by Dr. Lobo Galloway for iron deficiency anemia. She has a history of heterozygous factor V Leiden discovered at time of PE in 2015, was fully anticoagulated for 1 yr, then placed on ASA, but had reaction. She was NOT on any anticoagulation prior to admission. Received a dose or 2 of heparin pre-op. ORIF of the left femur was completed 12/20/2020. Post-op complications include UTI, anemia, and shooting/burning pain involving her right calf. Patient believes this pain is associated with her neuropathic pain from her lumbar stenosis. MD ordered doppler since patient has a hx of DVT. She was due for B12 injection in office next week but hematology decided to administer B 12 on 12/23/20 prior to transfer to ARU. Family / Caregiver Present: No  Referring Practitioner: Dr. Javi Rai  Subjective  Subjective: Pt feels her transfer this AM was improved from yesterday and is optimistic about her progress.   General Comment  Comments: Pt sitting in w/c, agreeable to PT          Orientation  Orientation  Overall Orientation Status: Within Normal Limits  Cognition      Objective         Wheelchair Activities  Level of Assistance for pressure relief activities: Modified independent  Wheelchair Parts Management: No  Propulsion: Yes  Propulsion 1  Propulsion: Manual  Level: Level Tile  Method: RUE  Level of Assistance: Minimal assistance  Description/ Details: min A for tight areas, tight turns  Distance: 150'     Balance  Sitting - Static: Good Sitting - Dynamic: Fair;+(AS evidenced by ability to WS in all directions noted with showering and dressing)  Standing - Static: Fair;-(With use of RW and the GB in the shower, but pt unable to maintain WB precautions)  Exercises  Hip Flexion: 15x R LE sitting  Knee Long Arc Quad: 15x R LE  Ankle Pumps: 15x B  Comments: w/c push ups 8x; rest breaks required, min A to support L LE   AROM RLE (degrees)  RLE AROM: WFL  AROM LLE (degrees)  LLE General AROM: Limited by L KI  intact. L ankle WFL. ( Pt apprehensive to move LLE 2/2 to pain and discomfort  Strength RLE  Strength RLE: WFL  Comment: grossly 4/5  Strength LLE  Comment: Limited throughout with inability to nilam full range L hip. L knee limited by immobilizer              Goals  Short term goals  Time Frame for Short term goals: 10 -14 days  Short term goal 1: Ind with bed mobility - ongoing  Short term goal 2: MI with transf with  LRAD maintaining WB precautions - ongoing  Short term goal 3: Pt able to propel w/c  on level surfaces x 300' at a time - ongoing  Short term goal 4: Pt amb with RW ~20' at a time while maintaining WB precautions as outlined by physician - ongoing  Short term goal 5: Pt to amb up and down 2 steps with AD with WB precautions as outlined by physician with CGA - ongoing  Long term goals  Time Frame for Long term goals : STG= LTG  Patient Goals   Patient goals : Get home ASAP.  Also \"not to fall again    Plan    Plan  Times per week: 5-7 x week x 90 minutes  Times per day: Twice a day  Current Treatment Recommendations: Strengthening, Transfer Training, Endurance Training, Patient/Caregiver Education & Training, Wheelchair Mobility Training, Pain Management, Balance Training, Gait Training, Functional Mobility Training, Stair training, Safety Education & Training, Positioning  Safety Devices  Type of devices: (Pt left in bathroom under the Supervision of OT)     Therapy Time   Individual Concurrent Group Co-treatment   Time In 0830 Time Out 0915         Minutes 45         Timed Code Treatment Minutes: 45 Minutes   15 min w/c, 20 min TE, 10 min TA      Second session:    Patient seen as co-treat with OT for second session to allow for safe practice of transfers with patient. W/c <> bed transfer min A x 2 via slideboard  Bed <> w/c transfer mod A x 2 via slideboard  w/c > commode transfer mod A x 2 stand pivot transfer  Commode > w/c transfer mod A x 1, max A x 1 stand pivot transfer    Pt initially performed slideboard transfer very well, but fatigues quickly requiring more and more assist throughout session. Pt required increased time and max A for scooting back on commode and weight shifting to allow for don/doff of pants. Rest breaks required throughout session. Pt also performed w/c mobility x 10' in tight quarters including multiple turns and backing up with min A for maneuvering through doorways.     Therapy Time   Individual Concurrent Group Co-treatment   Time In       10:45   Time Out       11:30   Minutes       45   Timed Code Treatment Minutes: 45 Minutes  35 min TA, 10 min w/c      Miguel Treadwell, PT

## 2020-12-25 NOTE — PROGRESS NOTES
Department of Physical Medicine & Rehabilitation  Progress Note    Patient Identification:  Freda Mckinley  [id number]  : 1945  Admit date: 2020    Chief Complaint: L hip fracture     Subjective:   No acute events overnight. Patient seen this am. Participating well in therapy without any issues. ROS: No f/c, n/v, cp     Objective:  Patient Vitals for the past 24 hrs:   BP Temp Temp src Pulse Resp SpO2   20 0805 (!) 164/70 98 °F (36.7 °C) Oral 100 18 97 %   20 2115 113/68 97.7 °F (36.5 °C) Oral 91 20 95 %     Const: Alert. No distress, pleasant. HEENT: Normocephalic, atraumatic. Normal sclera/conjunctiva. MMM. CV: Regular rate and rhythm. Resp: No respiratory distress. Lungs CTAB. Abd: Soft, nontender, nondistended, NABS+   Ext: + edema. Neuro: Alert, oriented, appropriately interactive. Psych: Cooperative, appropriate mood and affect    Laboratory data: Available via EMR.    Last 24 hour lab  Recent Results (from the past 24 hour(s))   POCT Glucose    Collection Time: 20  5:00 PM   Result Value Ref Range    POC Glucose 174 (H) 70 - 99 mg/dl    Performed on ACCU-CHEK    POCT Glucose    Collection Time: 20  9:47 PM   Result Value Ref Range    POC Glucose 163 (H) 70 - 99 mg/dl    Performed on ACCU-CHEK    Basic Metabolic Panel w/ Reflex to MG    Collection Time: 20  5:54 AM   Result Value Ref Range    Sodium 138 136 - 145 mmol/L    Potassium reflex Magnesium 4.9 3.5 - 5.1 mmol/L    Chloride 108 99 - 110 mmol/L    CO2 18 (L) 21 - 32 mmol/L    Anion Gap 12 3 - 16    Glucose 133 (H) 70 - 99 mg/dL    BUN 17 7 - 20 mg/dL    CREATININE 0.9 0.6 - 1.2 mg/dL    GFR Non-African American >60 >60    GFR African American >60 >60    Calcium 9.4 8.3 - 10.6 mg/dL   SPECIMEN REJECTION    Collection Time: 20  7:03 AM   Result Value Ref Range    Rejected Test CBCWD     Reason for Rejection see below    POCT Glucose    Collection Time: 20  7:31 AM Result Value Ref Range    POC Glucose 147 (H) 70 - 99 mg/dl    Performed on ACCU-CHEK    POCT Glucose    Collection Time: 20 11:50 AM   Result Value Ref Range    POC Glucose 158 (H) 70 - 99 mg/dl    Performed on ACCU-CHEK        Therapy progress:  PT  Position Activity Restriction  Other position/activity restrictions: L TTWB and foot flat WB listed, L knee immobilizer on at all times  Objective     Sit to Stand: Minimal Assistance(STS with min A from w/c and shwoer chair for LB clothing management. . Pt used the GB to assist.)  Stand to sit: Minimal Assistance(Req A with maneuvering the LLE when descending back into chair)     OT  PT Equipment Recommendations  Other: Ongoing assessment at this time  Toilet - Technique: Stand pivot  Equipment Used: Standard bedside commode  Toilet Transfers Comments: Mod A x 2; assist more for pivoting and to maintain TTWB/mgmt of LLE vs physically assisting with STS  Assessment        SLP          Body mass index is 33.29 kg/m². Assessment and Plan:   Ms. Hannah Jimenez will require ongoing medical management and daily physician oversight for the followin. Left femur fracture. Test post ORIF 2020. Patient will proceed with treatment per orthopedic protocol. - PT/OT required      2. Diabetes. Patient will continue with bolus correction regimen. Adjust as required once oral take is increased.      3. Coagulopathy. Patient has a history of heterozygous factor V Leiden deficiency. Patient has started Eliquis 2.5 mg by mouth twice a day per hematology recommendations.     4. Coronary artery disease. Clopidogrel has been restarted.      5. UTI. Patient's preprocedure urinalysis is consistent with urinary tract infection, greater than 100,000 CFU Klebsiella pneumonia. She continues on 1 g IV daily pending culture results. 6. Normocytic anemia. Hemoglobin on admission was 11.8.  It has decreased to 8.7 on  and 7.3 on . Continue to monitor.  Rehab Progress: Improving  Anticipated Dispo: home  Services/DME: TBD  ELOS: JA GracePZaraH  PM&R  12/25/2020  12:01 PM

## 2020-12-25 NOTE — PROGRESS NOTES
Pt room change to accommodate BR needs and improve in-room therapy and mobility needs. Pt agrees with new room assignment and all belongings transferred.

## 2020-12-25 NOTE — PLAN OF CARE
Problem: Falls - Risk of:  Goal: Will remain free from falls  Description: Will remain free from falls  12/25/2020 1014 by Nona Dinh RN  Outcome: Ongoing  Note: No falls since admission. Patient A/Ox4. Fall precautions in place. Call light in reach. Calls out for assistance. Chair/bed alarm activated. Non skid footwear on. For patient safety, Visual Surveillance is in place, reviewed with patient/family, verbalized understanding. Problem: Skin Integrity:  Goal: Will show no infection signs and symptoms  Description: Will show no infection signs and symptoms  Outcome: Ongoing  Note: No signs of infection. Surgical site is clean/dry/intact. Problem: Pain:  Description: Pain management should include both nonpharmacologic and pharmacologic interventions. Goal: Control of acute pain  Description: Control of acute pain  12/25/2020 1014 by Nona Dinh RN  Outcome: Ongoing  Note: Patient having 6/10 pain. Medicated with tramadol.

## 2020-12-25 NOTE — PLAN OF CARE
Problem: Pain:  Goal: Pain level will decrease  Outcome: Ongoing  Pt responds to NPI, communicates needs clearly     Problem: Pain:  Goal: Control of acute pain  12/25/2020 0252 by Ray Vital RN  Outcome: Ongoing       Problem: Falls - Risk of:  Goal: Will remain free from falls  Outcome: Ongoing

## 2020-12-25 NOTE — PROGRESS NOTES
Occupational Therapy  Facility/Department: [facility] ACUTE REHAB UNIT  Daily Treatment Note  NAME: Jhonatan Murray  : 1945  MRN: [id number]    Date of Service: 2020    Discharge Recommendations:  Continue to assess pending progress, 24 hour supervision or assist, Home with Home health OT  OT Equipment Recommendations  Other: continue to assess pending progress    Assessment   Performance deficits / Impairments: Decreased functional mobility ; Decreased endurance;Decreased ADL status; Decreased balance;Decreased strength;Decreased high-level IADLs;Decreased fine motor control  Assessment: Pt required A of 2 for sliding board transfers and stand pivot transfers to commode and bed. She requires max A for wt shifting and clothing management when toileting. Pt is motivated to increase strength and indep with all tasks. Recommend discharge to home with 24 hr A and home OT. Treatment Diagnosis: decreased ADL status, functional transfers and functional mobility 2/2 L distal femur fx  Prognosis: Good  OT Education: OT Role;Plan of Care;Precautions; ADL Adaptive Strategies;Transfer Training  Patient Education: pt verbalized understanding  REQUIRES OT FOLLOW UP: Yes  Activity Tolerance  Activity Tolerance: Patient Tolerated treatment well  Safety Devices  Safety Devices in place: Yes  Type of devices: Left in chair;Call light within reach; Chair alarm in place; All fall risk precautions in place         Patient Diagnosis(es): There were no encounter diagnoses. has a past medical history of Allergic, Anemia, Arthritis, Chronic kidney disease, Depression, Diabetes mellitus ([address] Disorders affecting multiple structures of eye, Epigastric pain, GERD (gastroesophageal reflux disease), Hyperlipidemia, Hypertension, IBS (irritable bowel syndrome), Intermittent atrial fibrillation ([address] Mass of lung, Migraine, Nausea & vomiting, Status post lung surgery, and Thyroid disease. has a past surgical history that includes brain surgery; joint replacement; Cholecystectomy; Bunionectomy; Hysterectomy; thoracotomy (3/10/11); Endoscopy, colon, diagnostic (8/22/2011); Appendectomy; colectomy; Cataract removal (2012); and knee surgery (Left, 10/11/2016). Restrictions  Restrictions/Precautions  Restrictions/Precautions: Weight Bearing  Required Braces or Orthoses?: Yes(L knee immobilizer)  Lower Extremity Weight Bearing Restrictions  Left Lower Extremity Weight Bearing: Foot Flat Weight Bearing(TTWB, flat foot wt bearing L LE)  Position Activity Restriction  Other position/activity restrictions: L TTWB and foot flat WB listed, L knee immobilizer on at all times  Subjective   General  Chart Reviewed: Yes  Patient assessed for rehabilitation services?: Yes  Additional Pertinent Hx: 65 y/o F with mechanical fall at home in bathroom. Imaging revealed left distal femur fracture and is now TTWB on LLE.  PMH significant for chronic back pain, bilateral total knee arthroplasties, & Diabetes  Family / Caregiver Present: No  Referring Practitioner: Dr Hector Diaz  Diagnosis: L distal femur fracture  Subjective  Subjective: Pt in w/c upon arrival.  Vital Signs  Patient Currently in Pain: Denies   Orientation  Orientation  Overall Orientation Status: Within Functional Limits  Objective    ADL  Grooming: Independent(indep washing hands with hand , while seated in w/c)  Toileting: Dependent/Total;Maximum assistance(max A with wt shifting for clothing management, mod A to reposition LLE while pt managed toileting hygiene with SBA)        Toilet Transfers  Toilet - Technique: Stand pivot  Equipment Used: Standard bedside commode(over toilet)  Toilet Transfer: Dependent/Total  Toilet Transfers Comments: w/c to 3 in 1 commode over toilet with mod A of 2, commode to w/c with max A of 1 and mod A of 1     Transfers  Slide Board: Dependent/Total Therapy Time   Individual Concurrent Group Co-treatment   Time In       3080   Time Out       1130   Minutes       39        Second Session Therapy Time:   Individual Concurrent Group Co-treatment   Time In 3655         Time Out 1400         Minutes 45               Total Treatment Minutes:  [facility], OTR/L [phone]

## 2020-12-25 NOTE — PROGRESS NOTES
RN assists pt onto bedpan w/o incident. Pt able to roll side to side with medium effort. Pt tolerates well.

## 2020-12-26 LAB
GLUCOSE BLD-MCNC: 144 MG/DL (ref 70–99)
GLUCOSE BLD-MCNC: 148 MG/DL (ref 70–99)
GLUCOSE BLD-MCNC: 156 MG/DL (ref 70–99)
GLUCOSE BLD-MCNC: 170 MG/DL (ref 70–99)
PERFORMED ON: ABNORMAL

## 2020-12-26 PROCEDURE — 97530 THERAPEUTIC ACTIVITIES: CPT

## 2020-12-26 PROCEDURE — 6370000000 HC RX 637 (ALT 250 FOR IP): Performed by: PHYSICAL MEDICINE & REHABILITATION

## 2020-12-26 PROCEDURE — 97110 THERAPEUTIC EXERCISES: CPT

## 2020-12-26 PROCEDURE — 1280000000 HC REHAB R&B

## 2020-12-26 PROCEDURE — 97535 SELF CARE MNGMENT TRAINING: CPT

## 2020-12-26 PROCEDURE — 97116 GAIT TRAINING THERAPY: CPT

## 2020-12-26 RX ORDER — LACTULOSE 10 G/15ML
20 SOLUTION ORAL 3 TIMES DAILY
Status: DISCONTINUED | OUTPATIENT
Start: 2020-12-26 | End: 2020-12-27

## 2020-12-26 RX ADMIN — GABAPENTIN 300 MG: 300 CAPSULE ORAL at 09:24

## 2020-12-26 RX ADMIN — DICLOFENAC 2 G: 10 GEL TOPICAL at 09:42

## 2020-12-26 RX ADMIN — CLOPIDOGREL BISULFATE 75 MG: 75 TABLET ORAL at 09:24

## 2020-12-26 RX ADMIN — INSULIN LISPRO 1 UNITS: 100 INJECTION, SOLUTION INTRAVENOUS; SUBCUTANEOUS at 20:23

## 2020-12-26 RX ADMIN — HYDROCODONE BITARTRATE AND ACETAMINOPHEN 1 TABLET: 5; 325 TABLET ORAL at 17:24

## 2020-12-26 RX ADMIN — INSULIN LISPRO 2 UNITS: 100 INJECTION, SOLUTION INTRAVENOUS; SUBCUTANEOUS at 12:10

## 2020-12-26 RX ADMIN — POLYETHYLENE GLYCOL 3350 17 G: 17 POWDER, FOR SOLUTION ORAL at 10:07

## 2020-12-26 RX ADMIN — HYDROCODONE BITARTRATE AND ACETAMINOPHEN 1 TABLET: 5; 325 TABLET ORAL at 09:21

## 2020-12-26 RX ADMIN — SERTRALINE HYDROCHLORIDE 100 MG: 100 TABLET ORAL at 09:24

## 2020-12-26 RX ADMIN — HYDROCHLOROTHIAZIDE 25 MG: 25 TABLET ORAL at 09:24

## 2020-12-26 RX ADMIN — LACTULOSE 20 G: 20 SOLUTION ORAL at 20:10

## 2020-12-26 RX ADMIN — TRAMADOL HYDROCHLORIDE 50 MG: 50 TABLET, COATED ORAL at 22:53

## 2020-12-26 RX ADMIN — INSULIN LISPRO 2 UNITS: 100 INJECTION, SOLUTION INTRAVENOUS; SUBCUTANEOUS at 09:33

## 2020-12-26 RX ADMIN — METOPROLOL SUCCINATE 50 MG: 50 TABLET, EXTENDED RELEASE ORAL at 09:31

## 2020-12-26 RX ADMIN — LACTULOSE 20 G: 20 SOLUTION ORAL at 14:18

## 2020-12-26 RX ADMIN — ALPRAZOLAM 0.5 MG: 0.5 TABLET ORAL at 22:53

## 2020-12-26 RX ADMIN — APIXABAN 2.5 MG: 5 TABLET, FILM COATED ORAL at 09:24

## 2020-12-26 RX ADMIN — PANTOPRAZOLE SODIUM 40 MG: 40 TABLET, DELAYED RELEASE ORAL at 06:33

## 2020-12-26 RX ADMIN — HYOSCYAMINE SULFATE 375 MCG: 0.38 TABLET, EXTENDED RELEASE ORAL at 22:54

## 2020-12-26 RX ADMIN — BISACODYL 10 MG: 10 SUPPOSITORY RECTAL at 09:23

## 2020-12-26 RX ADMIN — INSULIN LISPRO 2 UNITS: 100 INJECTION, SOLUTION INTRAVENOUS; SUBCUTANEOUS at 17:24

## 2020-12-26 RX ADMIN — GABAPENTIN 300 MG: 300 CAPSULE ORAL at 20:10

## 2020-12-26 RX ADMIN — GABAPENTIN 300 MG: 300 CAPSULE ORAL at 14:18

## 2020-12-26 RX ADMIN — LEVOTHYROXINE SODIUM 125 MCG: 0.03 TABLET ORAL at 06:33

## 2020-12-26 RX ADMIN — APIXABAN 2.5 MG: 5 TABLET, FILM COATED ORAL at 20:11

## 2020-12-26 RX ADMIN — DICLOFENAC 2 G: 10 GEL TOPICAL at 20:23

## 2020-12-26 RX ADMIN — PRAVASTATIN SODIUM 10 MG: 10 TABLET ORAL at 20:11

## 2020-12-26 ASSESSMENT — PAIN SCALES - GENERAL
PAINLEVEL_OUTOF10: 10
PAINLEVEL_OUTOF10: 6
PAINLEVEL_OUTOF10: 8

## 2020-12-26 NOTE — PROGRESS NOTES
Patient complained of feeling constipated. She states she had 3 small stools this morning. She has a history of IBS. and takes several antidiarrheal medications daily. Lomotil held. Patient assisted to bathroom twice using stand pivot transfer  with assistance of two. She had 2 small bowel movements.

## 2020-12-26 NOTE — PATIENT CARE CONFERENCE
Inpatient Rehabilitation  Weekly Team Conference Note  The [address], [address] [place] [address]  [phone]  Patient Name: Radha Peters        MRN: [id number]    : 1945  (66 y.o.)  Gender: female   Referring Practitioner: Dr. Reji Rai  Diagnosis: L distal femur fx    The team conference for this patient was held on 2020 at 10:30am by:  Kandice Rai,     PHYSICAL THERAPY:  Bed Mobility: Scooting: Supervision    Transfers:  Sit to Stand: Contact guard assistance, Minimal Assistance(SRS from w/c , mat table and BS chair to a RW with TTWB on the LLE)  Stand to sit: Contact guard assistance(VC for hand placement)  Squat Pivot Transfers: (w/c<>shower chair req mod A when leading with L side and min A when leading with the R.( shower chair sat higher than the w/c also enabling an easier transf))  Comment: 2/2 to c/o with the KI fit, PT removed the KI and readjusted the entire brace providing pt with education on donning the KI most effectively. This req additional time in which pt demo difficulty with reaching the straps distal to the L knee    WB Status: LLE with TTWB/ Foot flat WB rstrictions per chart review  Ambulation 1  Surface: level tile  Device: Rolling Walker  Other Apparatus: Wheelchair follow, Knee Immobilizer  Assistance: Contact guard assistance  Quality of Gait: Pt instructed with TTWB using a package of crackers at the bottom of the forefoot with instructions not to crack the crackers as a deterrant not to increase WB through the LLE. Gait Deviations: Slow Concha, Decreased step length, Decreased step height  Distance: 7 steps and then 12 steps. Pt c/o not feeling \"right\" and requested to stop. Pt appeared diaphoretic . SOB pronounced. /75, 98% SpO2, 88 HR. Following sitting x 5 minutes, 135/58, Spo2 dropped < 89% but came up quickly, HR 82.   Comments: Pt req an extended seated rest to stabilize before resuming activity    QM: Roll Left and Right  Assistance Needed: Supervision or touching assistance  CARE Score: 4  Discharge Goal: Independent  Sit to Lying  Assistance Needed: Partial/moderate assistance  CARE Score: 3  Discharge Goal: Independent  Lying to Sitting on Side of Bed  Assistance Needed: Partial/moderate assistance  CARE Score: 3  Discharge Goal: Independent  Sit to Stand  Assistance Needed: Partial/moderate assistance  CARE Score: 3  Discharge Goal: Independent  Chair/Bed-to-Chair Transfer  Assistance Needed: Partial/moderate assistance  CARE Score: 3  Discharge Goal: Supervision or touching assistance  Car Transfer  Reason if not Attempted: Not attempted due to environmental limitations  CARE Score: 10  Walk 10 Feet  Reason if not Attempted: Not attempted due to medical condition or safety concerns  CARE Score: 88  Discharge Goal: Not Attempted  Walk 50 Feet with Two Turns  Reason if not Attempted: Not attempted due to medical condition or safety concerns  CARE Score: 88  Discharge Goal: Not Attempted  Walk 150 Feet  Reason if not Attempted: Not attempted due to medical condition or safety concerns  CARE Score: 88  Discharge Goal: Not Attempted  Walking 10 Feet on Uneven Surfaces  Reason if not Attempted: Not attempted due to medical condition or safety concerns  CARE Score: 88  Discharge Goal: Not Attempted  1 Step (Curb)  Reason if not Attempted: Not attempted due to medical condition or safety concerns  CARE Score: 88  Discharge Goal: Supervision or touching assistance  4 Steps  Reason if not Attempted: Not attempted due to medical condition or safety concerns  CARE Score: 88  Discharge Goal: Not Attempted  12 Steps  Reason if not Attempted: Not attempted due to medical condition or safety concerns  CARE Score: 88  Discharge Goal: Not Attempted  Wheelchair Ability  Uses a Wheelchair and/or Scooter?: Yes    OCCUPATIONAL THERAPY:  ADL  Grooming: Independent(indep washing hands with hand , while seated in w/c) UE Bathing: Contact guard assistance, Increased time to complete(seated on TTB; lateral WS for pront periarea; use of GB; difficulty with WSing d/t limited space, see below)  LE Bathing: Dependent/Total, Setup, Verbal cueing, Increased time to complete(seated on TTB; assist to wash R lower limb and foot d/t limited space; total A to dry rear periarea with min A x 1 to maintain stance with BUE supported at GB and assist of another to dry buttocks)  UE Dressing: Minimal assistance(pt donned bra and shirt seated on TTB; assist to clasp bra in back, assist to pull shirt down in back)  LE Dressing: Maximum assistance(pt able to complete lateral WS and WC pushup in order for OT to doff and don pants to readjust knee immobilizer; total A to don R shoe for sake of time)  Toileting: Moderate assistance(pt continent of waterly loose stool; + time in attempt to void unsuccessfully; max A to manage brief off hips with CGA to maintain stance; total A for perihygiene in stance with BUE support at GB; min A to manage brief over hips in stance)  Additional Comments: ++ time spent with toileting d/t diarrhea and difficulty with pt voiding despite feeling her bladder is full    Toilet Transfers: Toilet Transfers  Toilet - Technique: Stand pivot  Equipment Used: Raised toilet seat with rails  Toilet Transfer: Minimal assistance  Toilet Transfers Comments: use of GB; VCs to maintain WBing precautions and VCs for hand placement when pivoting    Tub/ShowerTransfers:  Tub Transfers  Tub - Transfer From: Wheelchair  Tub - Transfer Type: To and From  Tub - Transfer To: Transfer tub bench  Tub - Technique: Lateral  Tub Transfers:  Moderate assistance  Tub Transfers Comments: VCs for hand placement, assist to manage LLE d/t knee immobilizer and increased pain in/out of tub(mod A to L into tub; min A to w/c leading to R)     QM:  Eating  Assistance Needed: Independent  CARE Score: 6  Discharge Goal: Independent  Oral Hygiene Assistance Needed: Supervision or touching assistance  CARE Score: 4  Discharge Goal: [address] Needed: Substantial/maximal assistance  CARE Score: 2  Discharge Goal: Supervision or touching assistance  Toilet Transfer  Assistance Needed: Partial/moderate assistance  CARE Score: 3  Discharge Goal: Supervision or touching assistance  Shower/Bathe Self  Assistance Needed: Dependent  CARE Score: 1  Discharge Goal: Supervision or touching assistance  Upper Body Dressing  Assistance Needed: Supervision or touching assistance  CARE Score: 4  Discharge Goal: Independent  Lower Body Dressing  Assistance Needed: Substantial/maximal assistance  CARE Score: 2  Discharge Goal: Supervision or touching assistance  Putting On/Taking Off Footwear  Assistance Needed: Dependent  CARE Score: 1  Discharge Goal: Independent    NUTRITION:  Please see nutrition note for details. Weight: 182 lb (82.6 kg)(per pt) / Body mass index is 33.29 kg/m². Diet Level/Order: DIET CARB CONTROL; Carb Control: 5 carb choices (75 gms)/meal  PO Meals Eaten (%): 51 - 75%    NURSING:  QM: Bladder Continence: Always continent  Bowel Continence: Not rated   Gomes Fall Risk Score: 50  Wounds/Incisions/Ulcers: Surgical incision to left hip  Medication Review: Medications reviewed with patient  Pain: Pt given Tramadol and Norco for pain to RLE & Left hip  Consultations/Labs/X-rays: BMP & CBC every M,W,F     Patient/Family Education provided by team:  Role of PT/OT, TTWBing, functional transfers, fall prevention    CASE MANAGEMENT:  Assessment:SW will continue to follow to update patient and family and assist with discharge planning. TEAM SUMMARY: Will continue with current poc & goals until anticipated d/c date of 1/8/2020.      Discharge Plan:  Risk for Readmission: 9, Low (0-9)  Critical Items: If High Risk, consider the following recommendations:Patient not at high risk  Estimated Length of Stay: 17 days Destination: home health  Services at Discharge: [facility], Occupational Therapy and Nursing 2x week  Equipment at Discharge: wheelchair  Community Resources:   Factors facilitating achievement of predicted outcomes: Family support, Motivated, Cooperative, Pleasant and Sense of humor  Barriers to the achievement of predicted outcomes: Decreased endurance, Upper extremity weakness, Lower extremity weakness and Long standing deficits    Interdisciplinary Individualized Plan of Care Review:    · Continue Current Plan of Care: Yes    · Modifications:_____________________________     Special Needs in the Upcoming Week:    [x] Family/Caregiver Education  [] Home visit  []Therapeutic Pass   [] Consults:_______   [] Family Training  [] Other;_______     Patient Goals for Rehab stay:  1. Be independent     Rehab Team Goals for patient for the Upcoming Week:  1. Increase independence with ADLs    Rehab Team Members in attendance for Team Conference:  :  MARYAM Cardenas    Therapy Manager:  Dominic Mcneil, PT, DPT    Nursing Director:  Felix Toussaint, MSN, RN, [facility]    Social Work:  Ana Laura Frank [place]    Nursing:  Tamika Mchugh, ANN Armando, ANN Obando, ANN    Therapy:  Wero Borges, PT  Malaika Sunshine, PT, DPT  Raven Santiago, PT, DPT    Siri Lutz, OTR/L  Jaxson Ignacio, OTR/L  Jane Brooks, OTR/L    Nutrition:  Isabelle Anglin, RD LD    I approve the established interdisciplinary plan of care as documented within the medical record of Blair Baron.     MD Signature Kolby Garcia D.O. M.P.H  PM&R  12/29/2020  10:57 AM

## 2020-12-26 NOTE — PROGRESS NOTES
Department of Physical Medicine & Rehabilitation  Progress Note    Patient Identification:  Bernadette Mccauley  [id number]  : 1945  Admit date: 2020    Chief Complaint: L hip fracture     Subjective:   Continues to have discomfort in her left leg but improving in therapy. Continue current treatment plan. ROS: No f/c, n/v, cp     Objective:  Patient Vitals for the past 24 hrs:   BP Temp Temp src Pulse Resp SpO2   20 0930 (!) 156/87 97.6 °F (36.4 °C) Oral 94 18 99 %   20 1947 127/72 98.1 °F (36.7 °C) Oral 87 18 94 %     Const: Alert. No distress, pleasant. HEENT: Normocephalic, atraumatic. Normal sclera/conjunctiva. MMM. CV: Regular rate and rhythm. Resp: No respiratory distress. Lungs CTAB. Abd: Soft, nontender, nondistended, NABS+   Ext: + edema. Neuro: Alert, oriented, appropriately interactive. Psych: Cooperative, appropriate mood and affect    Laboratory data: Available via EMR.    Last 24 hour lab  Recent Results (from the past 24 hour(s))   CBC Auto Differential    Collection Time: 20 11:39 AM   Result Value Ref Range    WBC 11.1 (H) 4.0 - 11.0 K/uL    RBC 2.79 (L) 4.00 - 5.20 M/uL    Hemoglobin 8.2 (L) 12.0 - 16.0 g/dL    Hematocrit 25.7 (L) 36.0 - 48.0 %    MCV 92.2 80.0 - 100.0 fL    MCH 29.5 26.0 - 34.0 pg    MCHC 32.0 31.0 - 36.0 g/dL    RDW 15.4 12.4 - 15.4 %    Platelets 897 201 - 976 K/uL    MPV 9.0 5.0 - 10.5 fL    Neutrophils % 62.0 %    Lymphocytes % 20.0 %    Monocytes % 15.0 %    Eosinophils % 0.0 %    Basophils % 1.0 %    Neutrophils Absolute 7.1 1.7 - 7.7 K/uL    Lymphocytes Absolute 2.2 1.0 - 5.1 K/uL    Monocytes Absolute 1.7 (H) 0.0 - 1.3 K/uL    Eosinophils Absolute 0.0 0.0 - 0.6 K/uL    Basophils Absolute 0.1 0.0 - 0.2 K/uL    Bands Relative 1 0 - 7 %    Metamyelocytes Relative 1 (A) %    nRBC 1 (A) /100 WBC    Anisocytosis 1+ (A)     Macrocytes Occasional (A)     Polychromasia Occasional (A)     Schistocytes Occasional (A) Ovalocytes Occasional (A)    POCT Glucose    Collection Time: 20 11:50 AM   Result Value Ref Range    POC Glucose 158 (H) 70 - 99 mg/dl    Performed on ACCU-CHEK    POCT Glucose    Collection Time: 20  4:50 PM   Result Value Ref Range    POC Glucose 165 (H) 70 - 99 mg/dl    Performed on ACCU-CHEK    POCT Glucose    Collection Time: 20  9:41 PM   Result Value Ref Range    POC Glucose 175 (H) 70 - 99 mg/dl    Performed on ACCU-CHEK    POCT Glucose    Collection Time: 20  7:30 AM   Result Value Ref Range    POC Glucose 144 (H) 70 - 99 mg/dl    Performed on ACCU-CHEK        Therapy progress:  PT  Position Activity Restriction  Other position/activity restrictions: L TTWB and foot flat WB listed, L knee immobilizer on at all times  Objective     Sit to Stand: Minimal Assistance(from WC with left arm rest and grab bar next to toilet)  Stand to sit: Minimal Assistance(assist to manage LLE with descent onto toilet, used grab bar on wall and commode rail)     OT  PT Equipment Recommendations  Other: Ongoing assessment at this time  Toilet - Technique: Stand pivot  Equipment Used: Standard bedside commode(over toilet)  Toilet Transfers Comments: w/c to 3 in 1 commode over toilet with mod A of 2, commode to w/c with max A of 1 and mod A of 1  Assessment        SLP          Body mass index is 33.29 kg/m². Assessment and Plan:   Ms. Tarik El will require ongoing medical management and daily physician oversight for the followin. Left femur fracture. Test post ORIF 2020. Patient will proceed with treatment per orthopedic protocol. - PT/OT required      2. Diabetes. Patient will continue with bolus correction regimen. Adjust as required once oral take is increased.      3. Coagulopathy. Patient has a history of heterozygous factor V Leiden deficiency.  Patient has started Eliquis 2.5 mg by mouth twice a day per hematology recommendations.    4. Coronary artery disease. Clopidogrel has been restarted.      5. UTI. Patient's preprocedure urinalysis is consistent with urinary tract infection, greater than 100,000 CFU Klebsiella pneumonia. She continues on 1 g IV daily pending culture results. 6. Normocytic anemia. Hemoglobin on admission was 11.8.  It has decreased to 8.7 on 12/22 and 7.3 on 12/23. Continue to monitor.     Rehab Progress: Improving  Anticipated Dispo: home  Services/DME: REFUGIO  ELOS: REFUGIO Ellison D.O. M.P.H  PM&R  12/26/2020  11:15 AM

## 2020-12-26 NOTE — PROGRESS NOTES
Occupational Therapy  Facility/Department: [facility] ACUTE REHAB UNIT  Daily Treatment Note  NAME: Apple Keene  : 1945  MRN: [id number]    Date of Service: 2020    Discharge Recommendations:  Continue to assess pending progress, 24 hour supervision or assist, Home with Home health OT  OT Equipment Recommendations  Other: continue to assess pending progress    Assessment   Performance deficits / Impairments: Decreased functional mobility ; Decreased endurance;Decreased ADL status; Decreased balance;Decreased strength;Decreased high-level IADLs;Decreased fine motor control  Assessment: Pt made excellent progress this date completing w/c push ups/WSing with CGA to assist with LB clothing mgmt seated in w/c. Pt completed functional mobility in // bars maintaining TTWBing initially with min A and progressing to CGA. Pt progressed to mod A for toileting to maintain stance for pericare hygiene and assist with LB clothing mgmt. Pt continues to function significantly below baseline, cont OT POC. Treatment Diagnosis: decreased ADL status, functional transfers and functional mobility 2/2 L distal femur fx  Prognosis: Good  OT Education: Plan of Care;Precautions; ADL Adaptive Strategies;Transfer Training  Patient Education: pt verbalized understanding  REQUIRES OT FOLLOW UP: Yes  Activity Tolerance  Activity Tolerance: Patient Tolerated treatment well  Activity Tolerance: Pt required extended rest breaks between bouts of functional mobility in // bars and demo SOB req VCs for PLB  Safety Devices  Safety Devices in place: Yes  Type of devices: Left in chair;Call light within reach; Chair alarm in place; All fall risk precautions in place         Patient Diagnosis(es): There were no encounter diagnoses. has a past medical history of Allergic, Anemia, Arthritis, Chronic kidney disease, Depression, Diabetes mellitus ([address] Disorders affecting multiple structures of eye, Epigastric pain, GERD (gastroesophageal reflux disease), Hyperlipidemia, Hypertension, IBS (irritable bowel syndrome), Intermittent atrial fibrillation ([address] Mass of lung, Migraine, Nausea & vomiting, Status post lung surgery, and Thyroid disease. has a past surgical history that includes brain surgery; joint replacement; Cholecystectomy; Bunionectomy; Hysterectomy; thoracotomy (3/10/11); Endoscopy, colon, diagnostic (8/22/2011); Appendectomy; colectomy; Cataract removal (2012); and knee surgery (Left, 10/11/2016). Restrictions  Restrictions/Precautions  Restrictions/Precautions: Weight Bearing  Required Braces or Orthoses?: Yes(L knee immobilizer)  Lower Extremity Weight Bearing Restrictions  Left Lower Extremity Weight Bearing: Foot Flat Weight Bearing(orders say both TTWB and flat foot WBing)  Position Activity Restriction  Other position/activity restrictions: L TTWB and foot flat WB listed, L knee immobilizer on at all times  Subjective   General  Chart Reviewed: Yes  Patient assessed for rehabilitation services?: Yes  Additional Pertinent Hx: 67 y/o F with mechanical fall at home in bathroom. Imaging revealed left distal femur fracture and is now TTWB on LLE. PMH significant for chronic back pain, bilateral total knee arthroplasties, & Diabetes  Family / Caregiver Present: No  Referring Practitioner: Dr Rudy Souza  Diagnosis: L distal femur fracture  Subjective  Subjective: Pt sitting in w/c upon arrival distressed about constipation but agreeable to OT/PT co-tx to maximize independence with functional mobility.   Vital Signs  Patient Currently in Pain: Yes(did not rate pain but verb discomfort in abdomen from constipation)     Orientation  Orientation  Overall Orientation Status: Within Functional Limits  Objective    ADL LE Dressing: Maximum assistance(pt able to complete lateral WS and WC pushup in order for OT to doff and don pants to readjust knee immobilizer; total A to don R shoe for sake of time)  Toileting: Moderate assistance; Increased time to complete(pt continent of bladder; + time in attempt to void; max A to manage brief/pants over/off hips with mod A x1 to maintain stance; pt completing perihygiene in stance with min A to maintain stance and unilateral UE support at GB)  Additional Comments: knee immobilizer required adjustment 2 x during session d/t poor fit; L pant leg cut for easier access to brace        Balance  Sitting Balance: Independent(seated on toilet)  Standing Balance: Minimal assistance(min A dynamically during perihygiene with unilateral UE support; CGA with BUEs in // bars)  Standing Balance  Time: ~10 minutes total  Activity: functional transfers; in stance for toileting; functional mobility in // bars  Comment: Pt completed ~8 feet x 3 in // bars initially min A progressing to CGA with max VCs to sequence and maintain TTWBing; pt initially with dec comprehension of mechanics for advancing BLEs but by third trial pt demo improvement and req decreased assist and cues  Functional Mobility  Functional - Mobility Device: Wheelchair  Activity: To/From therapy gym(from therapy gym only)  Assist Level: Supervision  Functional Mobility Comments: ~174 feet in hallway using BUEs and RLE to propel req VCs and demo to use RLE to propel, one rest break d/t fatigue and decreased activity tolerance  Toilet Transfers  Toilet - Technique: Stand pivot  Equipment Used: Raised toilet seat with rails  Toilet Transfer:  Moderate assistance  Toilet Transfers Comments: use of GB; VCs to maintain WBing precautions and VCs for hand placement when pivoting     Transfers  Sit to stand: Minimal assistance  Stand to sit: Minimal assistance(decreased eccentric control; VCs for hand placement to w/c in // bars) Cognition  Overall Cognitive Status: WFL        Second Session: Pt sitting in w/c upon arrival, pleasant and agreeable to OT session. Pt complaining that over the last 3 months her fine motor and hand strength has decreased. Pt educated that decreased hand strength can lead to increased risk of falls d/t tendency to drop items when walking; pt provided with red and blue foam resistance blocks to increase intrinsic hand strength, pt verb and demo understanding with x 10 gross grasp and release bilaterally. Pt instructed on HEP for fine motor as well as BUE with handout for orange theraband to increase BUE strength and activity tolerance for improved functional transfers and functional mobility. Pt completed x 8 of the following: sh flexion (only able to achieve ~95 degrees with RUE in pain free range), elbow flex, elbow ext, fwd punches, sh h abd, with demonstration and VCs for correct body mechanics for each exercise. Pt required rest breaks between sets d/t SOB and fatigue. Pt stating her RUE has been painful since her fall but she cannot recall if she had decreased ROM from arthritis prior to fall. Pt instructed on all of the exercises on the handout that were not completed d/t time constraints including sh extension, sh abduction, sh diagonal up, sh diagonal down. Pt verb understanding and demo with AROM against gravity (RUE unable to complete sh abduction without pain, instructed to skip). Pt left seated in w/c at EOS with chair alarm engaged, call light within reach and all needs met.      Plan   Plan  Times per week: 90 minutes per day 5 days/week  Times per day: Daily  Current Treatment Recommendations: Strengthening, Patient/Caregiver Education & Training, Home Management Training, Equipment Evaluation, Education, & procurement, Balance Training, Functional Mobility Training, Endurance Training, Safety Education & Training, Self-Care / ADL, Wheelchair Mobility Training  G-Code     OutComes Score AM-PAC Score             Goals  Short term goals  Time Frame for Short term goals: STG=LTG  Long term goals  Time Frame for Long term goals : 2 weeks-all ongoing  Long term goal 1: Pt will complete LB dressing with use of AE prn with spvn  Long term goal 2: Pt will complete UB dressing with mod I  Long term goal 3: Pt will complete bathing with spvn  Long term goal 4: Pt will complete toilet transfer with spvn maintaining weightbearing restrictions  Long term goal 5: Pt will complete toileting with spvn  Patient Goals   Patient goals :  To be more independent       Therapy Time   Individual Concurrent Group Co-treatment   Time In  1245     1100   Time Out  1330     1150   Minutes  45     50   Timed Code Treatment Minutes: 50 Minutes + 45  Total Treatment Time: [address], OT

## 2020-12-26 NOTE — PLAN OF CARE
Problem: Pain:  Goal: Control of acute pain  Description: Control of acute pain  Outcome: Ongoing  Note: Patient has complained of left leg pain. Immobilizer in place. Leg elevated on pillow. Medicated with Tramadol on request Will continue to monitor. Problem: Falls - Risk of:  Goal: Absence of physical injury  Description: Absence of physical injury  Outcome: Ongoing  Note: Patient has called appropriately for assistance. Fall precautions in place. Bed is in low and locked position. Bed alarm set. Call light and bedside commode within reach. She has remained free from injury.

## 2020-12-26 NOTE — PROGRESS NOTES
Physical Therapy  Facility/Department: [facility] ACUTE REHAB UNIT  Daily Treatment Note  NAME: Jennifer Miramontes  : 1945  MRN: [id number]    Date of Service: 2020    Discharge Recommendations:  Home with assist PRN, Patient would benefit from continued therapy after discharge, Home with Home health PT        Assessment   Assessment: Pt demonstrates improved mobility with stand-pivot transfer with mod assist x 1 this AM.  LImited currently by pain associated with constipationn. PT Education: Goals;Transfer Training;PT Role;Energy Conservation; Functional Mobility Training;Plan of Care;General Safety;Weight-bearing Education; Adaptive Device Training;Precautions;Gait Training  Patient Education: Needs reinforcement of WB precautions  Activity Tolerance  Activity Tolerance: Pt limited this AM due to having issues with constipation and unable to fully participate in mobility. Patient Diagnosis(es): There were no encounter diagnoses. has a past medical history of Allergic, Anemia, Arthritis, Chronic kidney disease, Depression, Diabetes mellitus ([address] Disorders affecting multiple structures of eye, Epigastric pain, GERD (gastroesophageal reflux disease), Hyperlipidemia, Hypertension, IBS (irritable bowel syndrome), Intermittent atrial fibrillation ([address] Mass of lung, Migraine, Nausea & vomiting, Status post lung surgery, and Thyroid disease. has a past surgical history that includes brain surgery; joint replacement; Cholecystectomy; Bunionectomy; Hysterectomy; thoracotomy (3/10/11); Endoscopy, colon, diagnostic (2011); Appendectomy; colectomy; Cataract removal (); and knee surgery (Left, 10/11/2016). Restrictions  Restrictions/Precautions  Restrictions/Precautions: Weight Bearing  Required Braces or Orthoses?: Yes(L knee immobilizer)  Lower Extremity Weight Bearing Restrictions  Left Lower Extremity Weight Bearing:  Foot Flat Weight Bearing(TTWB, flat foot wt bearing L LE) Position Activity Restriction  Other position/activity restrictions: L TTWB and foot flat WB listed, L knee immobilizer on at all times  Subjective   General Comment  Comments: Pt in [address] agreeable to working with PT  \"I have to go to the bathroom, I am constipated\"  Pain Screening  Patient Currently in Pain: Yes(notes pain in LLE, not rated, more severe pain from not being able to move her bowels)  Vital Signs  Patient Currently in Pain: Yes(notes pain in LLE, not rated, more severe pain from not being able to move her bowels)  Objective      Transfers  Sit to Stand: Minimal Assistance(from WC with left arm rest and grab bar next to toilet)  Stand to sit: Minimal Assistance(assist to manage LLE with descent onto toilet, used grab bar on wall and commode rail)  Stand Pivot Transfers: Moderate Assistance(stand pivot from WC to toilet with grab bar)        Balance  Comments: Good stability while sitting on WC and toilet,  Needed mod assist for stability during stand pivot transfer WC to toilet. Exercises  Comments: Muliple push ups from toilet using side rails to reposition on toilet. Assisted pt multiple times with pericare, pt attempting to move bowels  Second Session  WC mobility - 194 ft with supervision, one 30 sec rest.  Cues to use right foot to propel and to coordinate with elan UE's    Transfers - mod x 1 stand pivot with grab bar to toilet  Balance - min assist to maintain while pt performed pericare approx 2 min. Min assist to help pt pull up brief and pajama pants. Gait - ll bars - min assist for sit to stand pushing from arms of WC and grabbing for ll bars. Pt ambulated 8' x 3 in ll bars with initial min assist progressing to CG. Pt required constant cueing for sequencing - did well with maintaining TTWB on LLE. Adjusted KI x 2 2/2 slippage. Obtained long KI from 5T - 22\" adjusted for pt width, fit well, pt noted more comfort. [de-identified] old KI - will assess.       Goals  Short term goals

## 2020-12-27 LAB
GLUCOSE BLD-MCNC: 160 MG/DL (ref 70–99)
GLUCOSE BLD-MCNC: 161 MG/DL (ref 70–99)
GLUCOSE BLD-MCNC: 169 MG/DL (ref 70–99)
GLUCOSE BLD-MCNC: 91 MG/DL (ref 70–99)
PERFORMED ON: ABNORMAL
PERFORMED ON: NORMAL

## 2020-12-27 PROCEDURE — 6370000000 HC RX 637 (ALT 250 FOR IP): Performed by: PHYSICAL MEDICINE & REHABILITATION

## 2020-12-27 PROCEDURE — 1280000000 HC REHAB R&B

## 2020-12-27 RX ORDER — HYDROCORTISONE 25 MG/G
CREAM TOPICAL 2 TIMES DAILY
Status: DISCONTINUED | OUTPATIENT
Start: 2020-12-27 | End: 2021-01-08 | Stop reason: HOSPADM

## 2020-12-27 RX ORDER — LACTULOSE 10 G/15ML
20 SOLUTION ORAL 3 TIMES DAILY PRN
Status: DISCONTINUED | OUTPATIENT
Start: 2020-12-27 | End: 2021-01-08 | Stop reason: HOSPADM

## 2020-12-27 RX ADMIN — LEVOTHYROXINE SODIUM 125 MCG: 0.03 TABLET ORAL at 06:51

## 2020-12-27 RX ADMIN — DICLOFENAC 2 G: 10 GEL TOPICAL at 20:16

## 2020-12-27 RX ADMIN — CLOPIDOGREL BISULFATE 75 MG: 75 TABLET ORAL at 10:19

## 2020-12-27 RX ADMIN — DIPHENOXYLATE HYDROCHLORIDE AND ATROPINE SULFATE 1 TABLET: 2.5; .025 TABLET ORAL at 20:13

## 2020-12-27 RX ADMIN — PANTOPRAZOLE SODIUM 40 MG: 40 TABLET, DELAYED RELEASE ORAL at 06:50

## 2020-12-27 RX ADMIN — TRAMADOL HYDROCHLORIDE 50 MG: 50 TABLET, COATED ORAL at 07:27

## 2020-12-27 RX ADMIN — DICLOFENAC 2 G: 10 GEL TOPICAL at 10:25

## 2020-12-27 RX ADMIN — ERGOCALCIFEROL 50000 UNITS: 1.25 CAPSULE ORAL at 10:43

## 2020-12-27 RX ADMIN — PRAVASTATIN SODIUM 10 MG: 10 TABLET ORAL at 20:13

## 2020-12-27 RX ADMIN — TRAMADOL HYDROCHLORIDE 50 MG: 50 TABLET, COATED ORAL at 15:28

## 2020-12-27 RX ADMIN — METOPROLOL SUCCINATE 50 MG: 50 TABLET, EXTENDED RELEASE ORAL at 10:21

## 2020-12-27 RX ADMIN — GABAPENTIN 300 MG: 300 CAPSULE ORAL at 20:13

## 2020-12-27 RX ADMIN — ALPRAZOLAM 0.5 MG: 0.5 TABLET ORAL at 22:50

## 2020-12-27 RX ADMIN — HYDROCORTISONE 2.5%: 25 CREAM TOPICAL at 14:34

## 2020-12-27 RX ADMIN — ALPRAZOLAM 0.5 MG: 0.5 TABLET ORAL at 10:43

## 2020-12-27 RX ADMIN — APIXABAN 2.5 MG: 5 TABLET, FILM COATED ORAL at 20:14

## 2020-12-27 RX ADMIN — INSULIN LISPRO 2 UNITS: 100 INJECTION, SOLUTION INTRAVENOUS; SUBCUTANEOUS at 10:20

## 2020-12-27 RX ADMIN — HYDROCHLOROTHIAZIDE 25 MG: 25 TABLET ORAL at 10:20

## 2020-12-27 RX ADMIN — GABAPENTIN 300 MG: 300 CAPSULE ORAL at 14:33

## 2020-12-27 RX ADMIN — GABAPENTIN 300 MG: 300 CAPSULE ORAL at 10:19

## 2020-12-27 RX ADMIN — TIZANIDINE 2 MG: 4 TABLET ORAL at 22:49

## 2020-12-27 RX ADMIN — INSULIN LISPRO 2 UNITS: 100 INJECTION, SOLUTION INTRAVENOUS; SUBCUTANEOUS at 17:42

## 2020-12-27 RX ADMIN — APIXABAN 2.5 MG: 5 TABLET, FILM COATED ORAL at 10:19

## 2020-12-27 RX ADMIN — HYDROCORTISONE 2.5%: 25 CREAM TOPICAL at 20:13

## 2020-12-27 RX ADMIN — SERTRALINE HYDROCHLORIDE 100 MG: 100 TABLET ORAL at 10:21

## 2020-12-27 RX ADMIN — TRAMADOL HYDROCHLORIDE 50 MG: 50 TABLET, COATED ORAL at 20:14

## 2020-12-27 ASSESSMENT — PAIN DESCRIPTION - PROGRESSION: CLINICAL_PROGRESSION: NOT CHANGED

## 2020-12-27 ASSESSMENT — PAIN SCALES - GENERAL
PAINLEVEL_OUTOF10: 8
PAINLEVEL_OUTOF10: 6
PAINLEVEL_OUTOF10: 6
PAINLEVEL_OUTOF10: 4

## 2020-12-27 ASSESSMENT — PAIN DESCRIPTION - ONSET: ONSET: ON-GOING

## 2020-12-27 ASSESSMENT — PAIN DESCRIPTION - DESCRIPTORS: DESCRIPTORS: ACHING

## 2020-12-27 ASSESSMENT — PAIN DESCRIPTION - PAIN TYPE: TYPE: ACUTE PAIN

## 2020-12-27 NOTE — PLAN OF CARE
Problem: Pain:  Goal: Control of acute pain  Description: Control of acute pain  Outcome: Ongoing   Pt assessed for control of acute pain, instructed to notify RN of any changes in comfort. Will continue to monitor. Problem: Falls - Risk of:  Goal: Absence of physical injury  Description: Absence of physical injury  Outcome: Ongoing   Pt has been free from fall during shift. Pt has called out appropriately for needs. Call light in reach. Bed in lowest position. Bed and chair alarm on while in use. Will continue to monitor.

## 2020-12-27 NOTE — PROGRESS NOTES
Pt ao4, vss. C/o cramps and bloating. Pt received prn medications for her symptoms. Pt continues to address the issue with bowel movement and feels like it is packed in there and so has been trying to push for it to come out. Pt calls out appropriately for assistance. Bed alarm remains active.  Will continue to monitor

## 2020-12-27 NOTE — PROGRESS NOTES
Pt finally able to pass a big hard ball of stool this morning, feels relieved. Pt is up to the wheelchair w chair alarm on.  Will continue to monitor

## 2020-12-27 NOTE — PROGRESS NOTES
Department of Physical Medicine & Rehabilitation  Progress Note    Patient Identification:  Tye Burk  [id number]  : 1945  Admit date: 2020    Chief Complaint: L hip fracture     Subjective:   Feeling well today after having multiple BM. Improving in therapy. She is asking when she will be able to take her brace off (6 weeks from surgery). ROS: No f/c, n/v, cp     Objective:  Patient Vitals for the past 24 hrs:   BP Temp Temp src Pulse Resp SpO2   20 1020 (!) 146/70   86     20 0805 (!) 154/76 98.4 °F (36.9 °C) Oral 81 16 98 %   20 (!) 148/90 97.6 °F (36.4 °C) Oral 78 16 93 %     Const: Alert. No distress, pleasant. HEENT: Normocephalic, atraumatic. Normal sclera/conjunctiva. MMM. CV: Regular rate and rhythm. Resp: No respiratory distress. Lungs CTAB. Abd: Soft, nontender, nondistended, NABS+   Ext: + edema. Neuro: Alert, oriented, appropriately interactive. Psych: Cooperative, appropriate mood and affect    Laboratory data: Available via EMR.    Last 24 hour lab  Recent Results (from the past 24 hour(s))   POCT Glucose    Collection Time: 20  4:58 PM   Result Value Ref Range    POC Glucose 156 (H) 70 - 99 mg/dl    Performed on ACCU-CHEK    POCT Glucose    Collection Time: 20  8:22 PM   Result Value Ref Range    POC Glucose 148 (H) 70 - 99 mg/dl    Performed on ACCU-CHEK    POCT Glucose    Collection Time: 20  7:35 AM   Result Value Ref Range    POC Glucose 161 (H) 70 - 99 mg/dl    Performed on ACCU-CHEK    POCT Glucose    Collection Time: 20 11:52 AM   Result Value Ref Range    POC Glucose 91 70 - 99 mg/dl    Performed on ACCU-CHEK        Therapy progress:  PT  Position Activity Restriction  Other position/activity restrictions: L TTWB and foot flat WB listed, L knee immobilizer on at all times  Objective     Sit to Stand: Minimal Assistance(from WC with left arm rest and grab bar next to toilet) Stand to sit: Minimal Assistance(assist to manage LLE with descent onto toilet, used grab bar on wall and commode rail)     OT  PT Equipment Recommendations  Other: Ongoing assessment at this time  Toilet - Technique: Stand pivot  Equipment Used: Raised toilet seat with rails  Toilet Transfers Comments: use of GB; VCs to maintain WBing precautions and VCs for hand placement when pivoting  Assessment        SLP          Body mass index is 33.29 kg/m². Assessment and Plan:   Ms. Kellen Caceres will require ongoing medical management and daily physician oversight for the followin. Left femur fracture. Test post ORIF 2020. Patient will proceed with treatment per orthopedic protocol. - PT/OT required      2. Diabetes. Patient will continue with bolus correction regimen. Adjust as required once oral take is increased.      3. Coagulopathy. Patient has a history of heterozygous factor V Leiden deficiency. Patient has started Eliquis 2.5 mg by mouth twice a day per hematology recommendations.     4. Coronary artery disease. Clopidogrel has been restarted.      5. UTI. Patient's preprocedure urinalysis is consistent with urinary tract infection, greater than 100,000 CFU Klebsiella pneumonia. She continues on 1 g IV daily pending culture results. 6. Normocytic anemia. Hemoglobin on admission was 11.8.  It has decreased to 8.7 on  and 7.3 on . Continue to monitor.     Rehab Progress: Improving  Anticipated Dispo: home  Services/DME: [facility]  ELOS: JA EmersonP.H  PM&R  2020  12:08 PM

## 2020-12-28 LAB
ANION GAP SERPL CALCULATED.3IONS-SCNC: 11 MMOL/L (ref 3–16)
BASOPHILS ABSOLUTE: 0.1 K/UL (ref 0–0.2)
BASOPHILS RELATIVE PERCENT: 0.9 %
BUN BLDV-MCNC: 11 MG/DL (ref 7–20)
CALCIUM SERPL-MCNC: 8.8 MG/DL (ref 8.3–10.6)
CHLORIDE BLD-SCNC: 99 MMOL/L (ref 99–110)
CO2: 24 MMOL/L (ref 21–32)
CREAT SERPL-MCNC: 0.8 MG/DL (ref 0.6–1.2)
EOSINOPHILS ABSOLUTE: 1 K/UL (ref 0–0.6)
EOSINOPHILS RELATIVE PERCENT: 10.1 %
GFR AFRICAN AMERICAN: >60
GFR NON-AFRICAN AMERICAN: >60
GLUCOSE BLD-MCNC: 115 MG/DL (ref 70–99)
GLUCOSE BLD-MCNC: 137 MG/DL (ref 70–99)
GLUCOSE BLD-MCNC: 158 MG/DL (ref 70–99)
GLUCOSE BLD-MCNC: 164 MG/DL (ref 70–99)
GLUCOSE BLD-MCNC: 201 MG/DL (ref 70–99)
HCT VFR BLD CALC: 25.3 % (ref 36–48)
HEMOGLOBIN: 8.5 G/DL (ref 12–16)
LYMPHOCYTES ABSOLUTE: 2.2 K/UL (ref 1–5.1)
LYMPHOCYTES RELATIVE PERCENT: 21.6 %
MAGNESIUM: 1.6 MG/DL (ref 1.8–2.4)
MCH RBC QN AUTO: 30.4 PG (ref 26–34)
MCHC RBC AUTO-ENTMCNC: 33.6 G/DL (ref 31–36)
MCV RBC AUTO: 90.3 FL (ref 80–100)
MONOCYTES ABSOLUTE: 1.7 K/UL (ref 0–1.3)
MONOCYTES RELATIVE PERCENT: 16.4 %
NEUTROPHILS ABSOLUTE: 5.3 K/UL (ref 1.7–7.7)
NEUTROPHILS RELATIVE PERCENT: 51 %
PDW BLD-RTO: 15.5 % (ref 12.4–15.4)
PERFORMED ON: ABNORMAL
PLATELET # BLD: 372 K/UL (ref 135–450)
PMV BLD AUTO: 8.9 FL (ref 5–10.5)
POTASSIUM REFLEX MAGNESIUM: 3.1 MMOL/L (ref 3.5–5.1)
RBC # BLD: 2.8 M/UL (ref 4–5.2)
SODIUM BLD-SCNC: 134 MMOL/L (ref 136–145)
WBC # BLD: 10.3 K/UL (ref 4–11)

## 2020-12-28 PROCEDURE — 83735 ASSAY OF MAGNESIUM: CPT

## 2020-12-28 PROCEDURE — 97542 WHEELCHAIR MNGMENT TRAINING: CPT | Performed by: PHYSICAL THERAPIST

## 2020-12-28 PROCEDURE — 97116 GAIT TRAINING THERAPY: CPT | Performed by: PHYSICAL THERAPIST

## 2020-12-28 PROCEDURE — 85025 COMPLETE CBC W/AUTO DIFF WBC: CPT

## 2020-12-28 PROCEDURE — 36415 COLL VENOUS BLD VENIPUNCTURE: CPT

## 2020-12-28 PROCEDURE — 6370000000 HC RX 637 (ALT 250 FOR IP): Performed by: STUDENT IN AN ORGANIZED HEALTH CARE EDUCATION/TRAINING PROGRAM

## 2020-12-28 PROCEDURE — 80048 BASIC METABOLIC PNL TOTAL CA: CPT

## 2020-12-28 PROCEDURE — 1280000000 HC REHAB R&B

## 2020-12-28 PROCEDURE — 6370000000 HC RX 637 (ALT 250 FOR IP): Performed by: PHYSICAL MEDICINE & REHABILITATION

## 2020-12-28 PROCEDURE — 97530 THERAPEUTIC ACTIVITIES: CPT

## 2020-12-28 PROCEDURE — 97535 SELF CARE MNGMENT TRAINING: CPT

## 2020-12-28 PROCEDURE — 97530 THERAPEUTIC ACTIVITIES: CPT | Performed by: PHYSICAL THERAPIST

## 2020-12-28 RX ORDER — POTASSIUM CHLORIDE 29.8 MG/ML
20 INJECTION INTRAVENOUS ONCE
Status: DISCONTINUED | OUTPATIENT
Start: 2020-12-28 | End: 2020-12-28

## 2020-12-28 RX ADMIN — TRAMADOL HYDROCHLORIDE 50 MG: 50 TABLET, COATED ORAL at 14:29

## 2020-12-28 RX ADMIN — APIXABAN 2.5 MG: 5 TABLET, FILM COATED ORAL at 20:33

## 2020-12-28 RX ADMIN — TIZANIDINE 2 MG: 4 TABLET ORAL at 23:21

## 2020-12-28 RX ADMIN — METFORMIN HYDROCHLORIDE 1000 MG: 1000 TABLET ORAL at 20:33

## 2020-12-28 RX ADMIN — HYDROCODONE BITARTRATE AND ACETAMINOPHEN 1 TABLET: 5; 325 TABLET ORAL at 23:21

## 2020-12-28 RX ADMIN — GABAPENTIN 300 MG: 300 CAPSULE ORAL at 20:33

## 2020-12-28 RX ADMIN — DICLOFENAC 2 G: 10 GEL TOPICAL at 08:14

## 2020-12-28 RX ADMIN — HYDROCORTISONE 2.5%: 25 CREAM TOPICAL at 08:15

## 2020-12-28 RX ADMIN — METOPROLOL SUCCINATE 50 MG: 50 TABLET, EXTENDED RELEASE ORAL at 08:11

## 2020-12-28 RX ADMIN — INSULIN LISPRO 4 UNITS: 100 INJECTION, SOLUTION INTRAVENOUS; SUBCUTANEOUS at 12:17

## 2020-12-28 RX ADMIN — TRAMADOL HYDROCHLORIDE 50 MG: 50 TABLET, COATED ORAL at 08:04

## 2020-12-28 RX ADMIN — INSULIN LISPRO 2 UNITS: 100 INJECTION, SOLUTION INTRAVENOUS; SUBCUTANEOUS at 08:06

## 2020-12-28 RX ADMIN — HYOSCYAMINE SULFATE 375 MCG: 0.38 TABLET, EXTENDED RELEASE ORAL at 12:35

## 2020-12-28 RX ADMIN — ALPRAZOLAM 0.5 MG: 0.5 TABLET ORAL at 23:20

## 2020-12-28 RX ADMIN — DICLOFENAC 2 G: 10 GEL TOPICAL at 20:32

## 2020-12-28 RX ADMIN — DIPHENOXYLATE HYDROCHLORIDE AND ATROPINE SULFATE 1 TABLET: 2.5; .025 TABLET ORAL at 08:05

## 2020-12-28 RX ADMIN — POTASSIUM BICARBONATE 40 MEQ: 782 TABLET, EFFERVESCENT ORAL at 12:16

## 2020-12-28 RX ADMIN — DIPHENOXYLATE HYDROCHLORIDE AND ATROPINE SULFATE 1 TABLET: 2.5; .025 TABLET ORAL at 20:33

## 2020-12-28 RX ADMIN — GABAPENTIN 300 MG: 300 CAPSULE ORAL at 08:05

## 2020-12-28 RX ADMIN — SERTRALINE HYDROCHLORIDE 100 MG: 100 TABLET ORAL at 08:05

## 2020-12-28 RX ADMIN — LEVOTHYROXINE SODIUM 125 MCG: 0.03 TABLET ORAL at 06:51

## 2020-12-28 RX ADMIN — GABAPENTIN 300 MG: 300 CAPSULE ORAL at 14:23

## 2020-12-28 RX ADMIN — PRAVASTATIN SODIUM 10 MG: 10 TABLET ORAL at 20:33

## 2020-12-28 RX ADMIN — CLOPIDOGREL BISULFATE 75 MG: 75 TABLET ORAL at 08:05

## 2020-12-28 RX ADMIN — TRAMADOL HYDROCHLORIDE 50 MG: 50 TABLET, COATED ORAL at 20:32

## 2020-12-28 RX ADMIN — PANTOPRAZOLE SODIUM 40 MG: 40 TABLET, DELAYED RELEASE ORAL at 06:51

## 2020-12-28 RX ADMIN — APIXABAN 2.5 MG: 5 TABLET, FILM COATED ORAL at 08:05

## 2020-12-28 RX ADMIN — HYDROCHLOROTHIAZIDE 25 MG: 25 TABLET ORAL at 08:05

## 2020-12-28 RX ADMIN — HYDROCORTISONE 2.5%: 25 CREAM TOPICAL at 20:32

## 2020-12-28 ASSESSMENT — PAIN DESCRIPTION - LOCATION: LOCATION: HIP;LEG

## 2020-12-28 ASSESSMENT — PAIN DESCRIPTION - FREQUENCY
FREQUENCY: CONTINUOUS
FREQUENCY: CONTINUOUS

## 2020-12-28 ASSESSMENT — PAIN DESCRIPTION - PAIN TYPE
TYPE: ACUTE PAIN;SURGICAL PAIN
TYPE: SURGICAL PAIN;ACUTE PAIN

## 2020-12-28 ASSESSMENT — PAIN SCALES - GENERAL
PAINLEVEL_OUTOF10: 6
PAINLEVEL_OUTOF10: 6

## 2020-12-28 ASSESSMENT — PAIN DESCRIPTION - PROGRESSION
CLINICAL_PROGRESSION: NOT CHANGED
CLINICAL_PROGRESSION: GRADUALLY WORSENING
CLINICAL_PROGRESSION: NOT CHANGED

## 2020-12-28 ASSESSMENT — PAIN DESCRIPTION - DESCRIPTORS
DESCRIPTORS: ACHING

## 2020-12-28 ASSESSMENT — PAIN DESCRIPTION - ORIENTATION: ORIENTATION: LEFT

## 2020-12-28 NOTE — PROGRESS NOTES
Department of Physical Medicine & Rehabilitation  Progress Note    Patient Identification:  Luis Eduardo Patel  [id number]  : 1945  Admit date: 2020    Chief Complaint: L hip fracture     Subjective:   Patient seen at bedside this morning. Reports having constant BMs but likely 2/2 lactulose. Will adjust medications appropriately. Patients pain is well controlled. No acute events overnight. ROS: No f/c, n/v, cp     Objective:  Patient Vitals for the past 24 hrs:   BP Temp Temp src Pulse Resp   20 0811 124/69   82    20 126/62 98 °F (36.7 °C) Oral 84 18   20 1020 (!) 146/70   86      Const: Alert. No distress, pleasant. HEENT: Normocephalic, atraumatic. Normal sclera/conjunctiva. MMM. CV: Regular rate and rhythm. Resp: No respiratory distress. Lungs CTAB. Abd: Soft, nontender, nondistended, NABS+   Ext: + edema. Neuro: Alert, oriented, appropriately interactive. Psych: Cooperative, appropriate mood and affect    Laboratory data: Available via EMR.    Last 24 hour lab  Recent Results (from the past 24 hour(s))   POCT Glucose    Collection Time: 20 11:52 AM   Result Value Ref Range    POC Glucose 91 70 - 99 mg/dl    Performed on ACCU-CHEK    POCT Glucose    Collection Time: 20  4:40 PM   Result Value Ref Range    POC Glucose 160 (H) 70 - 99 mg/dl    Performed on ACCU-CHEK    POCT Glucose    Collection Time: 20  8:12 PM   Result Value Ref Range    POC Glucose 169 (H) 70 - 99 mg/dl    Performed on ACCU-CHEK    Basic Metabolic Panel w/ Reflex to MG    Collection Time: 20  6:55 AM   Result Value Ref Range    Sodium 134 (L) 136 - 145 mmol/L    Potassium reflex Magnesium 3.1 (L) 3.5 - 5.1 mmol/L    Chloride 99 99 - 110 mmol/L    CO2 24 21 - 32 mmol/L    Anion Gap 11 3 - 16    Glucose 137 (H) 70 - 99 mg/dL    BUN 11 7 - 20 mg/dL    CREATININE 0.8 0.6 - 1.2 mg/dL    GFR Non-African American >60 >60    GFR  >60 >60 Calcium 8.8 8.3 - 10.6 mg/dL   CBC auto differential    Collection Time: 12/28/20  6:55 AM   Result Value Ref Range    WBC 10.3 4.0 - 11.0 K/uL    RBC 2.80 (L) 4.00 - 5.20 M/uL    Hemoglobin 8.5 (L) 12.0 - 16.0 g/dL    Hematocrit 25.3 (L) 36.0 - 48.0 %    MCV 90.3 80.0 - 100.0 fL    MCH 30.4 26.0 - 34.0 pg    MCHC 33.6 31.0 - 36.0 g/dL    RDW 15.5 (H) 12.4 - 15.4 %    Platelets 561 949 - 667 K/uL    MPV 8.9 5.0 - 10.5 fL    Neutrophils % 51.0 %    Lymphocytes % 21.6 %    Monocytes % 16.4 %    Eosinophils % 10.1 %    Basophils % 0.9 %    Neutrophils Absolute 5.3 1.7 - 7.7 K/uL    Lymphocytes Absolute 2.2 1.0 - 5.1 K/uL    Monocytes Absolute 1.7 (H) 0.0 - 1.3 K/uL    Eosinophils Absolute 1.0 (H) 0.0 - 0.6 K/uL    Basophils Absolute 0.1 0.0 - 0.2 K/uL   Magnesium    Collection Time: 12/28/20  6:55 AM   Result Value Ref Range    Magnesium 1.60 (L) 1.80 - 2.40 mg/dL   POCT Glucose    Collection Time: 12/28/20  7:31 AM   Result Value Ref Range    POC Glucose 158 (H) 70 - 99 mg/dl    Performed on ACCU-CHEK        Therapy progress:  PT  Position Activity Restriction  Other position/activity restrictions: L TTWB and foot flat WB listed, L knee immobilizer on at all times  Objective     Sit to Stand: Minimal Assistance(from WC with left arm rest and grab bar next to toilet)  Stand to sit: Minimal Assistance(assist to manage LLE with descent onto toilet, used grab bar on wall and commode rail)     OT  PT Equipment Recommendations  Other: Ongoing assessment at this time  Toilet - Technique: Stand pivot  Equipment Used: Raised toilet seat with rails  Toilet Transfers Comments: use of GB; VCs to maintain WBing precautions and VCs for hand placement when pivoting  Assessment        SLP          Body mass index is 33.29 kg/m². Assessment and Plan:   Ms. Cori Kwan will require ongoing medical management and daily physician oversight for the following:  1. Left femur fracture. Test post ORIF 12/21/2020. Patient will proceed with treatment per orthopedic protocol. - PT/OT required      2. Diabetes. Patient will continue with bolus correction regimen. Adjust as required once oral take is increased.      3. Coagulopathy. Patient has a history of heterozygous factor V Leiden deficiency. Patient has started Eliquis 2.5 mg by mouth twice a day per hematology recommendations.     4. Coronary artery disease. Clopidogrel has been restarted.      5. UTI. Patient's preprocedure urinalysis is consistent with urinary tract infection, greater than 100,000 CFU Klebsiella pneumonia. She continues on 1 g IV daily pending culture results. 6. Normocytic anemia. Hemoglobin on admission was 11.8.  It has decreased to 8.7 on 12/22 and 7.3 on 12/23. Continue to monitor.     Rehab Progress: Improving  Anticipated Dispo: home  Services/DME: [facility]  ELOS: JA LoboP.H  PM&R  12/28/2020  9:49 AM

## 2020-12-28 NOTE — PROGRESS NOTES
Physical Therapy  Facility/Department: [facility] ACUTE REHAB UNIT  Daily Treatment Note  NAME: Yina Gallardo  : 1945  MRN: [id number]    Date of Service: 2020    Discharge Recommendations:  Home with assist PRN, Patient would benefit from continued therapy after discharge, Home with Home health PT   PT Equipment Recommendations  Other: Ongoing assessment at this time    Assessment   Body structures, Functions, Activity limitations: Decreased functional mobility ; Decreased ADL status; Decreased endurance;Decreased strength;Decreased balance; Increased pain;Decreased ROM  Assessment: Pt continues to require increased time and rest breaks throughout session d/t low endurance. Pt demo improved ability to maintain WB limitations on L LE with KI. Pt has arthritis in multiple joints which appear to be bothering her on this date. Pt continues to be motivated throughout session. A co treat is no longer indicated. Pt will be seen for individual therapy. Pt is functioning below baseline level and will continue to benefit from skilled physical therapy services to address limitations and maximize potential towards ind and safer d/c home. Treatment Diagnosis: Decreased functional mobility 2/2 to distal femur fx  Prognosis: Good  Decision Making: Medium Complexity  PT Education: Transfer Training;Energy Conservation; Functional Mobility Training;General Safety;Weight-bearing Education; Adaptive Device Training;Precautions;Gait Training  Patient Education: Needs reinforcement of WB precautions  Barriers to Learning: None  REQUIRES PT FOLLOW UP: Yes  Activity Tolerance  Activity Tolerance: Patient limited by fatigue;Patient limited by pain; Patient limited by endurance     Patient Diagnosis(es): There were no encounter diagnoses. has a past medical history of Allergic, Anemia, Arthritis, Chronic kidney disease, Depression, Diabetes mellitus ([address] Disorders affecting multiple structures of eye, Epigastric pain, GERD (gastroesophageal reflux disease), Hyperlipidemia, Hypertension, IBS (irritable bowel syndrome), Intermittent atrial fibrillation ([address] Mass of lung, Migraine, Nausea & vomiting, Status post lung surgery, and Thyroid disease. has a past surgical history that includes brain surgery; joint replacement; Cholecystectomy; Bunionectomy; Hysterectomy; thoracotomy (3/10/11); Endoscopy, colon, diagnostic (8/22/2011); Appendectomy; colectomy; Cataract removal (2012); and knee surgery (Left, 10/11/2016). Restrictions  Restrictions/Precautions  Restrictions/Precautions: Weight Bearing  Required Braces or Orthoses?: Yes(L knee immobilizer)  Lower Extremity Weight Bearing Restrictions  Left Lower Extremity Weight Bearing: Foot Flat Weight Bearing(orders say both TTWB and flat foot WBing)  Position Activity Restriction  Other position/activity restrictions: L TTWB and foot flat WB listed, L knee immobilizer on at all times  Subjective   General  Chart Reviewed:  Yes Additional Pertinent Hx: 67 YO F who fell in her bathroom when her right leg gave out causing her to fall onto her left side. Resulted in immediate pain in left leg and inability to ambulate. Brought to [facility] ED by EMS. Imaging revealed left distal femur fracture. PMH significant for chronic back pain, bilateral total knee arthroplasties, & Diabetes. She has chronic right leg pain due to documented L4-5 lumbar stenosis. She is followed by Dr. Kareem Baron for iron deficiency anemia. She has a history of heterozygous factor V Leiden discovered at time of PE in 2015, was fully anticoagulated for 1 yr, then placed on ASA, but had reaction. She was NOT on any anticoagulation prior to admission. Received a dose or 2 of heparin pre-op. ORIF of the left femur was completed 12/20/2020. Post-op complications include UTI, anemia, and shooting/burning pain involving her right calf. Patient believes this pain is associated with her neuropathic pain from her lumbar stenosis. MD ordered doppler since patient has a hx of DVT. She was due for B12 injection in office next week but hematology decided to administer B 12 on 12/23/20 prior to transfer to ARU. Family / Caregiver Present: No  Referring Practitioner: Dr. Isabell Gaspar: Pt reports that\" her brace is still bothering her since it is digging into both her incision and the bottom of her foot. \"  General Comment  Comments: Pt sitting in  BS chair when PT arrived. Pt agreeable to therapy. Pain Screening  Patient Currently in Pain: Yes(Pain in various areas. Pt did not rate)  Pain Assessment  Pain Type: Acute pain;Chronic pain  Pain Location: Shoulder; Incision;Wrist;Leg;Knee(B shlds, R wrist, LLE incisional site, R knee)  Pain Descriptors: Aching  Pain Frequency: Continuous(Intensity changes)  Pain Onset: On-going  Clinical Progression: Not changed Non-Pharmaceutical Pain Intervention(s): Ambulation/Increased Activity; Emotional support;Repositioned; Therapeutic presence  Response to Pain Intervention: Patient Satisfied  Vital Signs  Patient Currently in Pain: Yes(Pain in various areas. Pt did not rate)       Orientation  Orientation  Overall Orientation Status: Within Normal Limits  Cognition      Objective   Bed mobility  Rolling to Left: Supervision(Used the bedrail to assist with removing gown)  Rolling to Right: Supervision(Used the bedrail to assist with removing gown)  Sit to Supine: Minimal assistance  Scooting: Supervision  Comment: Pt extremely fatigued when positioned back in bed  Transfers  Sit to Stand: Contact guard assistance;Minimal Assistance(SRS from w/c , mat table and BS chair to a RW with TTWB on the LLE)  Stand to sit: Contact guard assistance(VC for hand placement)  Stand Pivot Transfers: Contact guard assistance(w/c<>mat 2x for practice with R side leading)  Comment: Pt has improved with ability to maintain WB precautions on LLE .  Pt moves very slowly and with increased apprehesion 2/2 to fear of falling  Ambulation?: NO  Stairs?: No  Wheelchair Activities  Wheelchair Size: Switched pt to a 20\" cassia height w/c  Wheelchair Cushion: Pressure Relieving  Level of Assistance for pressure relief activities: Modified independent  Wheelchair Parts Management: Yes  Left Leg Rest Level of Assistance: (Worked with taking LLE off of the legrest and flipping the shin guard up)  Left Brakes Level of Assistance: Supervision(VC to locate and fully engage)  Right Brakes Level of Assistance: Supervision(VC to locate and fully engage)  Propulsion: No  Propulsion 1  Propulsion: Manual  Level: Level Tile  Method: RUE;LUE;RLE(Very intermittently will use the R LE in stepping pattern)  Level of Assistance: Modified independent  Distance: 186' x2 Comment: 2/2 to c/o with the KI fit, PT removed the KI and readjusted the entire brace providing pt with education on donning the KI most effectively. This req additional time in which pt demo difficulty with reaching the straps distal to the L knee      Second session: Pt sitting in w/c when PT/ OT arrived. Pt agreeable to therapy. PT and OT worked collaboratively to utilize the skills of 2 disciplines while maximizing functional mobility to obtain optimal potential.    Pt reported that the [address] still keeps slipping and requested to use the other KI that she started out using. Therapy removed the KI and readjusted the entire brace providing pt with education on donning the KI most effectively. This req additional time in which pt demo difficulty with reaching the straps distal to the L knee  Bed mobility  Rolling to Left: Supervision(Used the bedrail to assist with removing gown)  Rolling to Right: Supervision(Used the bedrail to assist with removing gown)  Sit to Supine: Minimal assistance( req assist to maneuver the LLE onto the bed)  Scooting: Supervision for short sitting to the EOS  Comment: Pt extremely fatigued when positioned back in bed  Transfers  Sit to Stand: Contact guard assistance(STS from w/c  to a RW with TTWB on the LLE)  Stand to sit: Contact guard assistance(VC for hand placement)  Stand Pivot Transfers: Contact guard assistance(w/c<>bed with R side leading)  Comment: Pt has improved with ability to maintain WB precautions on LLE . Pt moves very slowly and with increased apprehesion 2/2 to fear of falling  Ambulation 1  Surface: level tile  Device: Rolling Walker  Other Apparatus: Wheelchair follow;Knee Immobilizer  Assistance: Contact guard assistance  Quality of Gait: Pt instructed with TTWB using a package of crackers at the bottom of the forefoot with instructions not to crack the crackers as a deterrant not to increase WB through the LLE. Gait Deviations: Slow Concha;Decreased step length;Decreased step height  Distance: 7 steps and then 12 steps. Pt c/o not feeling \"right\" and requested to stop. Pt appeared diaphoretic . SOB pronounced. /75, 98% SpO2, 88 HR. Following sitting x 5 minutes, 135/58, Spo2 dropped < 89% but came up quickly, HR 82. Comments: Pt req an extended seated rest to stabilize before resuming activity  Stairs/Curb  Stairs?: No  Propulsion: Yes  Propulsion 1  Propulsion: Manual  Level: Level Tile  Method: RUE;LUE;RLE(Very intermittently will use the R LE in stepping pattern)  Level of Assistance: Modified independent  Distance: 186' x1, 80' x1  Pt returned to room and positioned in bed. Call light and phone placed within reach. Bed alarm reactivated  G-Code     OutComes Score                                                     AM-PAC Score             Goals  Short term goals  Time Frame for Short term goals: 10 -14 days  Short term goal 1: Ind with bed mobility - ongoing  Short term goal 2: MI with transf with  LRAD maintaining WB precautions - ongoing  Short term goal 3: Pt able to propel w/c  on level surfaces x 300' at a time - ongoing  Short term goal 4: Pt amb with RW ~20' at a time while maintaining WB precautions as outlined by physician - ongoing  Short term goal 5: Pt to amb up and down 2 steps with AD with WB precautions as outlined by physician with CGA - ongoing  Long term goals  Time Frame for Long term goals : STG= LTG  Patient Goals   Patient goals : Get home ASAP.  Also \"not to fall again    Plan    Plan  Times per week: 5-7 x week x 90 minutes  Times per day: Twice a day  Current Treatment Recommendations: Strengthening, Transfer Training, Endurance Training, Patient/Caregiver Education & Training, Wheelchair Mobility Training, Pain Management, Balance Training, Gait Training, Functional Mobility Training, Stair training, Safety Education & Training, Positioning  Safety Devices

## 2020-12-28 NOTE — PROGRESS NOTES
Pt dressing wet from showers, wiped chlorhexidine swabs, applied new silver and mepilex d/t note in discharge instructions from Ortho MD to keep covered until follow up. Pt tolerated well, will continue to monitor.

## 2020-12-28 NOTE — PROGRESS NOTES
Occupational Therapy  Facility/Department: [facility] ACUTE REHAB UNIT  Daily Treatment Note  NAME: Tristan Guzmán  : 1945  MRN: [id number]    Date of Service: 2020    Discharge Recommendations:  Continue to assess pending progress, 24 hour supervision or assist, Home with Home health OT  OT Equipment Recommendations  Equipment Needed: Yes  Mobility Devices: ADL Assistive Devices  ADL Assistive Devices: Shower Chair with back    Assessment   Performance deficits / Impairments: Decreased functional mobility ; Decreased endurance;Decreased ADL status; Decreased balance;Decreased strength;Decreased high-level IADLs;Decreased fine motor control  Assessment: Pt session limited by toileting/diarrhea and having to reposition L knee immobilizer d/ poor fit. Pt becoming discouraged by health complications although she maintains high spirits and is highly motivated to return home. Pt functioning below baseline, cont OT POC. Treatment Diagnosis: decreased ADL status, functional transfers and functional mobility 2/2 L distal femur fx  Prognosis: Good  OT Education: Plan of Care;Precautions; ADL Adaptive Strategies;Transfer Training  Patient Education: pt showed a video of TTB dtr purchased to assess if proper DME was ordered, pt verbalized understanding  REQUIRES OT FOLLOW UP: Yes  Activity Tolerance  Activity Tolerance: Patient Tolerated treatment well  Activity Tolerance: Pt demo SOB and easily fatigued d/t decreased activity tolerance  Safety Devices  Type of devices: Left in chair;Call light within reach; Chair alarm in place; All fall risk precautions in place         Patient Diagnosis(es): There were no encounter diagnoses. has a past medical history of Allergic, Anemia, Arthritis, Chronic kidney disease, Depression, Diabetes mellitus ([address] Disorders affecting multiple structures of eye, Epigastric pain, GERD (gastroesophageal reflux disease), Hyperlipidemia, Hypertension, IBS (irritable bowel syndrome), Intermittent atrial fibrillation ([address] Mass of lung, Migraine, Nausea & vomiting, Status post lung surgery, and Thyroid disease. has a past surgical history that includes brain surgery; joint replacement; Cholecystectomy; Bunionectomy; Hysterectomy; thoracotomy (3/10/11); Endoscopy, colon, diagnostic (8/22/2011); Appendectomy; colectomy; Cataract removal (2012); and knee surgery (Left, 10/11/2016). Restrictions  Restrictions/Precautions  Restrictions/Precautions: Weight Bearing  Required Braces or Orthoses?: Yes(L knee immobilizer)  Lower Extremity Weight Bearing Restrictions  Left Lower Extremity Weight Bearing: Foot Flat Weight Bearing(orders say both TTWB and flat foot WBing)  Position Activity Restriction  Other position/activity restrictions: L TTWB and foot flat WB listed, L knee immobilizer on at all times  Subjective   General  Chart Reviewed: Yes  Patient assessed for rehabilitation services?: Yes  Additional Pertinent Hx: 67 y/o F with mechanical fall at home in bathroom. Imaging revealed left distal femur fracture and is now TTWB on LLE. PMH significant for chronic back pain, bilateral total knee arthroplasties, & Diabetes  Family / Caregiver Present: No  Referring Practitioner: Dr Jessica Armando  Diagnosis: L distal femur fracture  Subjective  Subjective: Pt sitting in w/c upon arrival distressed about diarrhea and stating her bottom feels sore but agreeable to OT session. L knee immobilizer observed to have slipped down, + time spent repositioning.   Vital Signs  Patient Currently in Pain: Yes(6/10 RLE pain)     Orientation  Orientation  Overall Orientation Status: Within Functional Limits  Objective    ADL Toileting:  Moderate assistance(pt continent of waterly loose stool; + time in attempt to void unsuccessfully; max A to manage brief off hips with CGA to maintain stance; total A for perihygiene in stance with BUE support at GB; min A to manage brief over hips in stance)  Additional Comments: ++ time spent with toileting d/t diarrhea and difficulty with pt voiding despite feeling her bladder is full        Toilet Transfers  Toilet - Technique: Stand pivot  Equipment Used: Raised toilet seat with rails  Toilet Transfer: Minimal assistance  Toilet Transfers Comments: use of GB; VCs to maintain WBing precautions and VCs for hand placement when pivoting                             Cognition  Overall Cognitive Status: [facility] Second Session: Pt sitting in w/c upon arrival, pleasant and agreeable to OT/PT cotx to maximize independence with functional mobility. L knee immobilizer continued to require repositioning d/t poor fit and therefore was switched back to the original brace pt came with from [employer]. Pt propelled self in w/c using BUEs and RLE to therapy gym ~120 feet with spvn assist requiring increased time d/t BUE weakness and decreased activity tolerance. Pt demo SOB at end of task requiring prolonged rest break prior to initiating functional mobility. A packet of crackers was placed into pt's L sock and pt was educated to maintain TTWB through LLE to maintain integrity of crackers, pt verb understanding. STS from w/c<>RW with CGA-Min A. Pt completed 7 steps using RW with CGA and VCs for fwd progression of RW and maintaining TTWB. Pt quickly became fatigued and demo CRAIG requiring extended seated rest break. Pt progressed to 12 steps using RW with CGA with increased evaristo with encouragement. Pt c/o fatigue and fearful of RLE giving out requiring a rest break. Stand<sit to w/c with CGA and VCs for hand placement. Pt demo SOB, became diophoretic and pale, pt provided with cold washcloth and vitals immediately assessed. BP: 151/75 HR 80 O2 98%. Pt recovered after ~4 minutes. Pt pushed back to room for sake of time. Squat pivot from w/c<EOB with CGA. Pt able to scoot superiorly at EOB with spvn assist. Sit<supine with min A to manage LLE. Pt left in bed at end of session with bed alarm engaged, call light within reach and all needs met.          Plan   Plan  Times per week: 90 minutes per day 5 days/week  Times per day: Daily Current Treatment Recommendations: Strengthening, Patient/Caregiver Education & Training, Home Management Training, Equipment Evaluation, Education, & procurement, Balance Training, Functional Mobility Training, Endurance Training, Safety Education & Training, Self-Care / ADL, Wheelchair Mobility Training  G-Code     OutComes Score                                                  AM-PAC Score             Goals  Short term goals  Time Frame for Short term goals: STG=LTG  Long term goals  Time Frame for Long term goals : 2 weeks-all ongoing  Long term goal 1: Pt will complete LB dressing with use of AE prn with spvn  Long term goal 2: Pt will complete UB dressing with mod I  Long term goal 3: Pt will complete bathing with spvn  Long term goal 4: Pt will complete toilet transfer with spvn maintaining weightbearing restrictions  Long term goal 5: Pt will complete toileting with spvn  Patient Goals   Patient goals :  To be more independent       Therapy Time   Individual Concurrent Group Co-treatment   Time In 1115      1245   Time Out 1204      1330   Minutes 49      45   Timed Code Treatment Minutes: 49 Minutes + 45=94 minutes       Arti Lemus, OT

## 2020-12-28 NOTE — PROGRESS NOTES
CBC auto differential    Collection Time: 12/28/20  6:55 AM   Result Value Ref Range    WBC 10.3 4.0 - 11.0 K/uL    RBC 2.80 (L) 4.00 - 5.20 M/uL    Hemoglobin 8.5 (L) 12.0 - 16.0 g/dL    Hematocrit 25.3 (L) 36.0 - 48.0 %    MCV 90.3 80.0 - 100.0 fL    MCH 30.4 26.0 - 34.0 pg    MCHC 33.6 31.0 - 36.0 g/dL    RDW 15.5 (H) 12.4 - 15.4 %    Platelets 506 520 - 294 K/uL    MPV 8.9 5.0 - 10.5 fL    Neutrophils % 51.0 %    Lymphocytes % 21.6 %    Monocytes % 16.4 %    Eosinophils % 10.1 %    Basophils % 0.9 %    Neutrophils Absolute 5.3 1.7 - 7.7 K/uL    Lymphocytes Absolute 2.2 1.0 - 5.1 K/uL    Monocytes Absolute 1.7 (H) 0.0 - 1.3 K/uL    Eosinophils Absolute 1.0 (H) 0.0 - 0.6 K/uL    Basophils Absolute 0.1 0.0 - 0.2 K/uL   Magnesium    Collection Time: 12/28/20  6:55 AM   Result Value Ref Range    Magnesium 1.60 (L) 1.80 - 2.40 mg/dL   POCT Glucose    Collection Time: 12/28/20  7:31 AM   Result Value Ref Range    POC Glucose 158 (H) 70 - 99 mg/dl    Performed on ACCU-CHEK        Therapy progress:  PT  Position Activity Restriction  Other position/activity restrictions: L TTWB and foot flat WB listed, L knee immobilizer on at all times  Objective     Sit to Stand: Minimal Assistance(from WC with left arm rest and grab bar next to toilet)  Stand to sit: Minimal Assistance(assist to manage LLE with descent onto toilet, used grab bar on wall and commode rail)     OT  PT Equipment Recommendations  Other: Ongoing assessment at this time  Toilet - Technique: Stand pivot  Equipment Used: Raised toilet seat with rails  Toilet Transfers Comments: use of GB; VCs to maintain WBing precautions and VCs for hand placement when pivoting  Assessment        SLP          Body mass index is 33.29 kg/m². Assessment and Plan:   Ms. Babak Tolliver will require ongoing medical management and daily physician oversight for the following:  1. Left femur fracture. Test post ORIF 12/21/2020. Patient will proceed with treatment per orthopedic protocol. - PT/OT required      2. Diabetes. Patient will continue with bolus correction regimen. Adjust as required once oral take is increased.      3. Coagulopathy. Patient has a history of heterozygous factor V Leiden deficiency. Patient has started Eliquis 2.5 mg by mouth twice a day per hematology recommendations.     4. Coronary artery disease. Clopidogrel has been restarted.      5. UTI. Patient's preprocedure urinalysis is consistent with urinary tract infection, greater than 100,000 CFU Klebsiella pneumonia. She continues on 1 g IV daily pending culture results. 6. Normocytic anemia. Hemoglobin on admission was 11.8.  It has decreased to 8.7 on 12/22 and 7.3 on 12/23. Continue to monitor.     Rehab Progress: Improving  Anticipated Dispo: home  Services/DME: REFUGIO  ELOS: JA RobertsonPZaraH  PM&R  12/28/2020  10:27 AM

## 2020-12-29 ENCOUNTER — APPOINTMENT (OUTPATIENT)
Dept: GENERAL RADIOLOGY | Age: 75
DRG: 560 | End: 2020-12-29
Attending: PHYSICAL MEDICINE & REHABILITATION
Payer: MEDICARE

## 2020-12-29 PROBLEM — S72.402A CLOSED FRACTURE OF DISTAL END OF LEFT FEMUR, INITIAL ENCOUNTER (HCC): Status: ACTIVE | Noted: 2020-12-23

## 2020-12-29 LAB
ANION GAP SERPL CALCULATED.3IONS-SCNC: 15 MMOL/L (ref 3–16)
BASOPHILS ABSOLUTE: 0.1 K/UL (ref 0–0.2)
BASOPHILS RELATIVE PERCENT: 0.9 %
BUN BLDV-MCNC: 15 MG/DL (ref 7–20)
CALCIUM SERPL-MCNC: 8.8 MG/DL (ref 8.3–10.6)
CHLORIDE BLD-SCNC: 94 MMOL/L (ref 99–110)
CO2: 22 MMOL/L (ref 21–32)
CREAT SERPL-MCNC: 1.1 MG/DL (ref 0.6–1.2)
D DIMER: 481 NG/ML DDU (ref 0–229)
EKG ATRIAL RATE: 65 BPM
EKG DIAGNOSIS: NORMAL
EKG P AXIS: 59 DEGREES
EKG P-R INTERVAL: 166 MS
EKG Q-T INTERVAL: 438 MS
EKG QRS DURATION: 80 MS
EKG QTC CALCULATION (BAZETT): 455 MS
EKG R AXIS: 84 DEGREES
EKG T AXIS: 23 DEGREES
EKG VENTRICULAR RATE: 65 BPM
EOSINOPHILS ABSOLUTE: 0.8 K/UL (ref 0–0.6)
EOSINOPHILS RELATIVE PERCENT: 6 %
GFR AFRICAN AMERICAN: 58
GFR NON-AFRICAN AMERICAN: 48
GLUCOSE BLD-MCNC: 131 MG/DL (ref 70–99)
GLUCOSE BLD-MCNC: 144 MG/DL (ref 70–99)
GLUCOSE BLD-MCNC: 157 MG/DL (ref 70–99)
GLUCOSE BLD-MCNC: 184 MG/DL (ref 70–99)
GLUCOSE BLD-MCNC: 190 MG/DL (ref 70–99)
HCT VFR BLD CALC: 25.4 % (ref 36–48)
HEMOGLOBIN: 8.3 G/DL (ref 12–16)
LYMPHOCYTES ABSOLUTE: 2.1 K/UL (ref 1–5.1)
LYMPHOCYTES RELATIVE PERCENT: 16.4 %
MAGNESIUM: 1.4 MG/DL (ref 1.8–2.4)
MCH RBC QN AUTO: 30.2 PG (ref 26–34)
MCHC RBC AUTO-ENTMCNC: 32.8 G/DL (ref 31–36)
MCV RBC AUTO: 92.1 FL (ref 80–100)
MONOCYTES ABSOLUTE: 1.7 K/UL (ref 0–1.3)
MONOCYTES RELATIVE PERCENT: 13.2 %
NEUTROPHILS ABSOLUTE: 8.2 K/UL (ref 1.7–7.7)
NEUTROPHILS RELATIVE PERCENT: 63.5 %
PDW BLD-RTO: 16.2 % (ref 12.4–15.4)
PERFORMED ON: ABNORMAL
PLATELET # BLD: 399 K/UL (ref 135–450)
PMV BLD AUTO: 8.9 FL (ref 5–10.5)
POTASSIUM REFLEX MAGNESIUM: 3.2 MMOL/L (ref 3.5–5.1)
RBC # BLD: 2.76 M/UL (ref 4–5.2)
SODIUM BLD-SCNC: 131 MMOL/L (ref 136–145)
TROPONIN: 0.02 NG/ML
TROPONIN: 0.02 NG/ML
WBC # BLD: 12.9 K/UL (ref 4–11)

## 2020-12-29 PROCEDURE — 97535 SELF CARE MNGMENT TRAINING: CPT

## 2020-12-29 PROCEDURE — 73502 X-RAY EXAM HIP UNI 2-3 VIEWS: CPT

## 2020-12-29 PROCEDURE — 6360000002 HC RX W HCPCS: Performed by: STUDENT IN AN ORGANIZED HEALTH CARE EDUCATION/TRAINING PROGRAM

## 2020-12-29 PROCEDURE — 72100 X-RAY EXAM L-S SPINE 2/3 VWS: CPT

## 2020-12-29 PROCEDURE — 97530 THERAPEUTIC ACTIVITIES: CPT | Performed by: PHYSICAL THERAPIST

## 2020-12-29 PROCEDURE — 84484 ASSAY OF TROPONIN QUANT: CPT

## 2020-12-29 PROCEDURE — 83735 ASSAY OF MAGNESIUM: CPT

## 2020-12-29 PROCEDURE — 2580000003 HC RX 258: Performed by: STUDENT IN AN ORGANIZED HEALTH CARE EDUCATION/TRAINING PROGRAM

## 2020-12-29 PROCEDURE — 85379 FIBRIN DEGRADATION QUANT: CPT

## 2020-12-29 PROCEDURE — 36415 COLL VENOUS BLD VENIPUNCTURE: CPT

## 2020-12-29 PROCEDURE — 80048 BASIC METABOLIC PNL TOTAL CA: CPT

## 2020-12-29 PROCEDURE — 6370000000 HC RX 637 (ALT 250 FOR IP): Performed by: STUDENT IN AN ORGANIZED HEALTH CARE EDUCATION/TRAINING PROGRAM

## 2020-12-29 PROCEDURE — 97542 WHEELCHAIR MNGMENT TRAINING: CPT | Performed by: PHYSICAL THERAPIST

## 2020-12-29 PROCEDURE — 6370000000 HC RX 637 (ALT 250 FOR IP): Performed by: PHYSICAL MEDICINE & REHABILITATION

## 2020-12-29 PROCEDURE — 93005 ELECTROCARDIOGRAM TRACING: CPT | Performed by: STUDENT IN AN ORGANIZED HEALTH CARE EDUCATION/TRAINING PROGRAM

## 2020-12-29 PROCEDURE — 1280000000 HC REHAB R&B

## 2020-12-29 PROCEDURE — 85025 COMPLETE CBC W/AUTO DIFF WBC: CPT

## 2020-12-29 PROCEDURE — 97110 THERAPEUTIC EXERCISES: CPT | Performed by: PHYSICAL THERAPIST

## 2020-12-29 PROCEDURE — 97116 GAIT TRAINING THERAPY: CPT | Performed by: PHYSICAL THERAPIST

## 2020-12-29 PROCEDURE — 93010 ELECTROCARDIOGRAM REPORT: CPT | Performed by: INTERNAL MEDICINE

## 2020-12-29 RX ORDER — POTASSIUM CHLORIDE 20 MEQ/1
40 TABLET, EXTENDED RELEASE ORAL ONCE
Status: COMPLETED | OUTPATIENT
Start: 2020-12-29 | End: 2020-12-29

## 2020-12-29 RX ORDER — KETOROLAC TROMETHAMINE 30 MG/ML
15 INJECTION, SOLUTION INTRAMUSCULAR; INTRAVENOUS ONCE
Status: COMPLETED | OUTPATIENT
Start: 2020-12-29 | End: 2020-12-29

## 2020-12-29 RX ORDER — 0.9 % SODIUM CHLORIDE 0.9 %
500 INTRAVENOUS SOLUTION INTRAVENOUS ONCE
Status: COMPLETED | OUTPATIENT
Start: 2020-12-29 | End: 2020-12-29

## 2020-12-29 RX ORDER — LANOLIN ALCOHOL/MO/W.PET/CERES
800 CREAM (GRAM) TOPICAL 2 TIMES DAILY
Status: COMPLETED | OUTPATIENT
Start: 2020-12-29 | End: 2020-12-30

## 2020-12-29 RX ADMIN — DICLOFENAC 2 G: 10 GEL TOPICAL at 21:45

## 2020-12-29 RX ADMIN — SERTRALINE HYDROCHLORIDE 100 MG: 100 TABLET ORAL at 07:56

## 2020-12-29 RX ADMIN — APIXABAN 2.5 MG: 5 TABLET, FILM COATED ORAL at 21:42

## 2020-12-29 RX ADMIN — POTASSIUM BICARBONATE 40 MEQ: 782 TABLET, EFFERVESCENT ORAL at 07:56

## 2020-12-29 RX ADMIN — HYDROCHLOROTHIAZIDE 25 MG: 25 TABLET ORAL at 07:55

## 2020-12-29 RX ADMIN — HYDROCORTISONE 2.5%: 25 CREAM TOPICAL at 10:16

## 2020-12-29 RX ADMIN — SODIUM CHLORIDE 500 ML: 9 INJECTION, SOLUTION INTRAVENOUS at 15:47

## 2020-12-29 RX ADMIN — GABAPENTIN 300 MG: 300 CAPSULE ORAL at 07:56

## 2020-12-29 RX ADMIN — GABAPENTIN 300 MG: 300 CAPSULE ORAL at 14:09

## 2020-12-29 RX ADMIN — HYDROCODONE BITARTRATE AND ACETAMINOPHEN 1 TABLET: 5; 325 TABLET ORAL at 14:09

## 2020-12-29 RX ADMIN — DICLOFENAC 2 G: 10 GEL TOPICAL at 07:56

## 2020-12-29 RX ADMIN — PRAVASTATIN SODIUM 10 MG: 10 TABLET ORAL at 21:42

## 2020-12-29 RX ADMIN — TIZANIDINE 2 MG: 4 TABLET ORAL at 12:57

## 2020-12-29 RX ADMIN — APIXABAN 2.5 MG: 5 TABLET, FILM COATED ORAL at 07:56

## 2020-12-29 RX ADMIN — TRAMADOL HYDROCHLORIDE 50 MG: 50 TABLET, COATED ORAL at 10:15

## 2020-12-29 RX ADMIN — DIPHENOXYLATE HYDROCHLORIDE AND ATROPINE SULFATE 1 TABLET: 2.5; .025 TABLET ORAL at 21:42

## 2020-12-29 RX ADMIN — METOPROLOL SUCCINATE 50 MG: 50 TABLET, EXTENDED RELEASE ORAL at 07:55

## 2020-12-29 RX ADMIN — HYDROCORTISONE 2.5%: 25 CREAM TOPICAL at 21:45

## 2020-12-29 RX ADMIN — DIPHENOXYLATE HYDROCHLORIDE AND ATROPINE SULFATE 1 TABLET: 2.5; .025 TABLET ORAL at 07:56

## 2020-12-29 RX ADMIN — TRAMADOL HYDROCHLORIDE 50 MG: 50 TABLET, COATED ORAL at 22:55

## 2020-12-29 RX ADMIN — ACETAMINOPHEN 650 MG: 325 TABLET ORAL at 12:56

## 2020-12-29 RX ADMIN — HYDROCODONE BITARTRATE AND ACETAMINOPHEN 1 TABLET: 5; 325 TABLET ORAL at 21:42

## 2020-12-29 RX ADMIN — HYDROCODONE BITARTRATE AND ACETAMINOPHEN 1 TABLET: 5; 325 TABLET ORAL at 07:55

## 2020-12-29 RX ADMIN — POTASSIUM CHLORIDE 40 MEQ: 1500 TABLET, EXTENDED RELEASE ORAL at 18:25

## 2020-12-29 RX ADMIN — LEVOTHYROXINE SODIUM 125 MCG: 0.03 TABLET ORAL at 06:23

## 2020-12-29 RX ADMIN — METFORMIN HYDROCHLORIDE 500 MG: 500 TABLET ORAL at 07:55

## 2020-12-29 RX ADMIN — CLOPIDOGREL BISULFATE 75 MG: 75 TABLET ORAL at 07:56

## 2020-12-29 RX ADMIN — GABAPENTIN 300 MG: 300 CAPSULE ORAL at 21:42

## 2020-12-29 RX ADMIN — ALPRAZOLAM 0.5 MG: 0.5 TABLET ORAL at 22:55

## 2020-12-29 RX ADMIN — Medication 800 MG: at 21:42

## 2020-12-29 RX ADMIN — METFORMIN HYDROCHLORIDE 1000 MG: 1000 TABLET ORAL at 21:44

## 2020-12-29 RX ADMIN — ALPRAZOLAM 0.5 MG: 0.5 TABLET ORAL at 07:55

## 2020-12-29 RX ADMIN — PANTOPRAZOLE SODIUM 40 MG: 40 TABLET, DELAYED RELEASE ORAL at 06:23

## 2020-12-29 RX ADMIN — KETOROLAC TROMETHAMINE 15 MG: 30 INJECTION, SOLUTION INTRAMUSCULAR at 16:09

## 2020-12-29 ASSESSMENT — ENCOUNTER SYMPTOMS
EYES NEGATIVE: 1
ALLERGIC/IMMUNOLOGIC NEGATIVE: 1
DIARRHEA: 1
RESPIRATORY NEGATIVE: 1

## 2020-12-29 ASSESSMENT — PAIN DESCRIPTION - ONSET
ONSET: ON-GOING

## 2020-12-29 ASSESSMENT — PAIN SCALES - GENERAL
PAINLEVEL_OUTOF10: 6
PAINLEVEL_OUTOF10: 8
PAINLEVEL_OUTOF10: 7
PAINLEVEL_OUTOF10: 7
PAINLEVEL_OUTOF10: 6
PAINLEVEL_OUTOF10: 7

## 2020-12-29 ASSESSMENT — PAIN DESCRIPTION - PROGRESSION
CLINICAL_PROGRESSION: NOT CHANGED
CLINICAL_PROGRESSION: NOT CHANGED

## 2020-12-29 ASSESSMENT — PAIN DESCRIPTION - FREQUENCY
FREQUENCY: CONTINUOUS
FREQUENCY: INTERMITTENT
FREQUENCY: CONTINUOUS
FREQUENCY: CONTINUOUS

## 2020-12-29 ASSESSMENT — PAIN DESCRIPTION - PAIN TYPE
TYPE: CHRONIC PAIN;ACUTE PAIN
TYPE: SURGICAL PAIN

## 2020-12-29 ASSESSMENT — PAIN DESCRIPTION - LOCATION
LOCATION: BACK;HIP;LEG
LOCATION: LEG;HIP
LOCATION: BACK;LEG
LOCATION: BACK;LEG

## 2020-12-29 ASSESSMENT — PAIN DESCRIPTION - ORIENTATION
ORIENTATION: RIGHT
ORIENTATION: RIGHT

## 2020-12-29 ASSESSMENT — PAIN DESCRIPTION - DESCRIPTORS: DESCRIPTORS: SHARP

## 2020-12-29 ASSESSMENT — PAIN - FUNCTIONAL ASSESSMENT
PAIN_FUNCTIONAL_ASSESSMENT: PREVENTS OR INTERFERES SOME ACTIVE ACTIVITIES AND ADLS
PAIN_FUNCTIONAL_ASSESSMENT: PREVENTS OR INTERFERES WITH ALL ACTIVE AND SOME PASSIVE ACTIVITIES

## 2020-12-29 NOTE — SIGNIFICANT EVENT
Code blue called at 15:14  Upon arrival of the ICU team patient was in bed per nursing staff, patient had just finished her 3hr session of rehab, was complaining of right hip and leg pain along with back pain. To alleviate this she was given some cold packs which did not seem to alleviate the pain. Some hot packs were applied and that seemed to work well. While she was being transferred from the chair to the bed, she had a syncopal episode w/ BP in the 90's/60's, became unresponsive even to sternal rubs and the code was called. Patient in the room was alert, complaining of pain, patient was hyperventilating but endorsed it was due to pain. Patient was able to answer all questions appropriately, she was alert and oriented. On recheck of her BP it improved to 97/60's w/ HR 68. A 500cc bolus was ordered. Labs ordered included: CBC, BMP, Troponin, D-dimer. An EKG was ordered along with Right Hip XR. Patient was given 15mg IV Toradol for pain. Of note, this patient is s/p Left Femur ORIF POD#8 and has a history of Factory V Leiden on Eliquis 5mg BID. EKG came back as NSR w/out ischemia, Hgb came back stable around 8, awaiting other labs, at this time CU team suspects vasovagal syncope related to pain.  Patient endorsed prior episodes of syncope related to pain    Seng Holland MD PGY-1

## 2020-12-29 NOTE — PROGRESS NOTES
Updated daughter Randall Nolasco. Daughter states this has happened before. She would like an ortho consult to address the issue with her back that caused her to fall in the first place. Dr Francesca Chino was okay consulted ortho.

## 2020-12-29 NOTE — PROGRESS NOTES
NUTRITION ASSESSMENT  Admission Date: 12/23/2020     Type and Reason for Visit: Reassess    NUTRITION RECOMMENDATIONS:   1. PO Diet: Continue current CC5 diet    2. ONS: Provided per family as PRN. NUTRITION ASSESSMENT:  Pt is improving from a nutritional standpoint AEB increased po intakes. Pt reports that she has had a difficult time with finding foods on the menu. Discussed menu/meal options w/pt. Per EMR, pt has been consuming >50% of meals this admission. Encouraged po intakes of meals and hydration. Will continue to monitor nutritional status this admission. MALNUTRITION ASSESSMENT  Context of Malnutrition: Acute Illness   Malnutrition Status: At risk for malnutrition (Comment)  Findings of the 6 clinical characteristics of malnutrition (Minimum of 2 out of 6 clinical characteristics is required to make the diagnosis of moderate or severe Protein Calorie Malnutrition based on AND/ASPEN Guidelines):  Energy Intake: Less than/equal to 75% of estimated energy requirements    Energy Intake Time: Greater than or equal to 5 days    Weight Loss %: Unable to assess    Weight loss Time: Greater than or equal to 1 year   Due to current CDC guidelines recommending 6-ft distancing for social isolation for COVID19 prevention, physical aspects of the malnutrition assessment were withheld at this time.      NUTRITION DIAGNOSIS   Problem: Problem #1: Inadequate oral intake  Etiology: Decreased ability to consume sufficient energy   Signs & Symptoms: Diet history of poor intake     NUTRITION INTERVENTION  Food and/or Nutrient Delivery:Continue Current diet   Nutrition education/counseling/coordination of care: Continue Inpatient Monitoring     NUTRITION MONITORING & EVALUATION:  Evaluation:Modified current goal   Goals:Goals: Pt will consume >50% of meals this admission  Monitoring: Meal Intake , Pertinent Labs  or Supplement Intake      OBJECTIVE DATA: · Nutrition-Focused Physical Findings: No edema, LBM 12/29, FF=088 mg/dL  · Wounds Surgical Wound      Past Medical History:   Diagnosis Date    Allergic     Anemia     Arthritis     Chronic kidney disease     Depression     Diabetes mellitus (HCC)     Disorders affecting multiple structures of eye     Epigastric pain     GERD (gastroesophageal reflux disease)     Hyperlipidemia     Hypertension     IBS (irritable bowel syndrome)     Intermittent atrial fibrillation ([address] 3/12/2011    Mass of lung     right    Migraine     Nausea & vomiting     Status post lung surgery 3/10/2011    Thyroid disease         ANTHROPOMETRICS  Current Height: 5' 2\" (157.5 cm)  Current Weight: 182 lb (82.6 kg)(per pt)    Admission weight: 182 lb (82.6 kg)(per pt)  Ideal Bodyweight 110 lb/ 50 kg   Usual Bodyweight JOANIE   Adjusted Bodyweight n/a  Weight Changes JOANIE       BMI BMI (Calculated): 33.4    Wt Readings from Last 50 Encounters:   12/23/20 182 lb (82.6 kg)   08/14/17 166 lb (75.3 kg)       COMPARATIVE STANDARDS  Estimated Total Kcals/Day : 15-18 Current Bodyweight (82.6 kg) 1020-3116 kcal    Estimated Total Protein (g/day) : 1.2-1.4 Ideal Bodyweight  (50 kg) 60-70 g/day  Estimated Daily Total Fluid (ml/day): >1500 mL per day     Food / Nutrition-Related History  Pre-Admission / Home Diet:  Pre-Admission/Home Diet: General   Home Supplements / Herbals:    none noted  Food Restrictions / Cultural Requests:    none noted    Current Nutrition Therapies   DIET CARB CONTROL; Carb Control: 5 carb choices (75 gms)/meal     PO Intake: 1-25% and 51-75%  PO Supplement: From home   PO Supplement Intake: %  IVF: none    NUTRITION RISK LEVEL: Risk Level:  Moderate     Rudy Aparicio [address]  [place]:  [phone]  Office:  [phone]

## 2020-12-29 NOTE — PROGRESS NOTES
Physical Therapy  Facility/Department: [facility] ACUTE REHAB UNIT  Daily Treatment Note  NAME: Remington Hammond  : 1945  MRN: [id number]    Date of Service: 2020    Discharge Recommendations:  Home with assist PRN, Patient would benefit from continued therapy after discharge, Home with Home health PT   PT Equipment Recommendations  Other: Ongoing assessment at this time    Assessment   Body structures, Functions, Activity limitations: Decreased functional mobility ; Decreased ADL status; Decreased endurance;Decreased strength;Decreased balance; Increased pain;Decreased ROM  Assessment: Pt continues to require increased time and rest breaks throughout session d/t low endurance. Pt demo improved ability to maintain WB limitations on L LE with KI. Pt has arthritis in multiple joints which appear to be bothering her on this date. Pt continues to be motivated throughout session. Pt is functioning below baseline level and will continue to benefit from skilled physical therapy services to address limitations and maximize potential towards ind and safer d/c home. Treatment Diagnosis: Decreased functional mobility 2/2 to distal femur fx  Prognosis: Good  Decision Making: Medium Complexity  PT Education: Transfer Training;Energy Conservation; Functional Mobility Training;General Safety;Weight-bearing Education; Adaptive Device Training;Precautions;Gait Training  Patient Education: Needs reinforcement of WB precautions  Barriers to Learning: None  REQUIRES PT FOLLOW UP: Yes  Activity Tolerance  Activity Tolerance: Patient limited by fatigue;Patient limited by pain; Patient limited by endurance     Patient Diagnosis(es): There were no encounter diagnoses. has a past medical history of Allergic, Anemia, Arthritis, Chronic kidney disease, Depression, Diabetes mellitus ([address] Disorders affecting multiple structures of eye, Epigastric pain, GERD (gastroesophageal reflux disease), Hyperlipidemia, Hypertension, IBS (irritable bowel syndrome), Intermittent atrial fibrillation ([address] Mass of lung, Migraine, Nausea & vomiting, Status post lung surgery, and Thyroid disease. has a past surgical history that includes brain surgery; joint replacement; Cholecystectomy; Bunionectomy; Hysterectomy; thoracotomy (3/10/11); Endoscopy, colon, diagnostic (8/22/2011); Appendectomy; colectomy; Cataract removal (2012); and knee surgery (Left, 10/11/2016). Restrictions  Restrictions/Precautions  Restrictions/Precautions: Weight Bearing  Required Braces or Orthoses?: Yes(L knee immobilizer)  Lower Extremity Weight Bearing Restrictions  Left Lower Extremity Weight Bearing: Foot Flat Weight Bearing(orders say both TTWB and flat foot WBing)  Position Activity Restriction  Other position/activity restrictions: L TTWB and foot flat WB listed, L knee immobilizer on at all times  Subjective   General  Chart Reviewed:  Yes Additional Pertinent Hx: 65 YO F who fell in her bathroom when her right leg gave out causing her to fall onto her left side. Resulted in immediate pain in left leg and inability to ambulate. Brought to [facility] ED by EMS. Imaging revealed left distal femur fracture. PMH significant for chronic back pain, bilateral total knee arthroplasties, & Diabetes. She has chronic right leg pain due to documented L4-5 lumbar stenosis. She is followed by Dr. Blaire Sandoval for iron deficiency anemia. She has a history of heterozygous factor V Leiden discovered at time of PE in 2015, was fully anticoagulated for 1 yr, then placed on ASA, but had reaction. She was NOT on any anticoagulation prior to admission. Received a dose or 2 of heparin pre-op. ORIF of the left femur was completed 12/20/2020. Post-op complications include UTI, anemia, and shooting/burning pain involving her right calf. Patient believes this pain is associated with her neuropathic pain from her lumbar stenosis. MD ordered doppler since patient has a hx of DVT. She was due for B12 injection in office next week but hematology decided to administer B 12 on 12/23/20 prior to transfer to ARU. Family / Caregiver Present: No  Referring Practitioner: Dr. Rui Rai  Subjective  Subjective: Pt reports that she had a shower this am and tolerated it well. Pt still c/o LBP /leg pain on the R  General Comment  Comments: Pt sitting in  BS chair when PT arrived. Pt agreeable to therapy.   Pain Screening  Patient Currently in Pain: Yes  Pain Assessment  Pain Level: 6  Patient's Stated Pain Goal: No pain  Pain Location: Back;Hip;Leg  Pain Orientation: Right  Pain Descriptors: Burning;Stabbing  Pain Frequency: Intermittent  Pain Onset: On-going(Pt reports that she ws being treated for her LB 2 weeks PTA and contributes her fall to the pain)  Clinical Progression: Not changed  Functional Pain Assessment: Prevents or interferes some active activities and ADLs Non-Pharmaceutical Pain Intervention(s): Repositioned; Therapeutic presence; Ambulation/Increased Activity; Emotional support  Response to Pain Intervention: None  Vital Signs  Patient Currently in Pain: Yes       Orientation  Orientation  Overall Orientation Status: Within Normal Limits  Cognition      Objective   Bed mobility  Rolling to Left: Supervision  Rolling to Right: Supervision  Supine to Sit: Contact guard assistance(VC for energy efficient technique)  Sit to Supine: Minimal assistance  Scooting: Supervision(VC for technique)  Comment: Bed mobility performed on mat table and not in the bed  Transfers  Sit to Stand: Contact guard assistance(STS from w/c , mat table to a RW with TTWB on the LLE)  Stand to sit: Contact guard assistance(VC for hand placement)  Stand Pivot Transfers: Contact guard assistance(w/c<>mat  leading with both sides)  Comment: Pt has improved with ability to maintain WB precautions on LLE .  Pt moves very slowly and with increased apprehesion 2/2 to fear of falling  Ambulation  Ambulation?: Yes(Pt unable to maintain precautions with the LLE when transferring,)  WB Status: LLE with TTWB/ Foot flat WB rstrictions per chart review  More Ambulation?: No  Ambulation 1  Surface: level tile  Device: Rolling Walker  Other Apparatus: Wheelchair follow;Knee Immobilizer  Assistance: Contact guard assistance  Quality of Gait: Step to gt pattern  Gait Deviations: Slow Concha;Decreased step length;Decreased step height  Distance: 25' 15'  Comments: Pt req an extended seated rest to stabilize breathing  before resuming activity  Stairs/Curb  Stairs?: No  Wheelchair Activities  Wheelchair Size: Switched pt to a 20\" cassia height w/c  Wheelchair Cushion: Pressure Relieving  Level of Assistance for pressure relief activities: Modified independent  Wheelchair Parts Management: Yes  Left Leg Rest Level of Assistance: (Worked with taking LLE off of the legrest and flipping the shin guard up) Left Brakes Level of Assistance: Modified independent(VC to locate and fully engage)  Right Brakes Level of Assistance: Modified independent(VC to locate and fully engage)  Propulsion: No  Propulsion 1  Propulsion: Manual  Level: Level Tile  Method: RUE;LUE;RLE(Very intermittently will use the R LE in stepping pattern)  Level of Assistance: Modified independent  Distance: 186' x2      Comment: Pt cont to have problems with the [address] PT removed the KI and readjusted the entire brace providing pt with education on donning the KI most effectively. This req additional time in which pt demo difficulty with reaching the straps distal to the L knee. A piece of dycem added to distal and proximal ends of the KI to prevent slippage    PT instructed pt with the following ex performed in supine to BLES:  1. AP/ QS/ GS  ( combination isometrics)  2.1/2  Bridges with the RLE with hold of \"3\"   3. .Hip abd with knee extended  4. SLRs    Pablo 10 reps of each with rests between     Second session: Pt sitting in w/c when PT arrived. Pt writhing of pain and very  Tearful. Pt reports that the nurse gave her pain meds ~10 minutes ago. Pt appeared very restless and agreed to getting back in bed for stretches in attempt to relieve some of the pain. Transf w/c > bed with min/mod A x1 ( R side leading)   Bed mobility: Sit> supine with mod A x1 with PT maneuvering BLES onto the bed  Scooting req min A x1 as pt performed 1/2 bridge with RLE and PT assisted with moving the LLE. Current Treatment Recommendations: Strengthening, Transfer Training, Endurance Training, Patient/Caregiver Education & Training, Wheelchair Mobility Training, Pain Management, Balance Training, Gait Training, Functional Mobility Training, Stair training, Safety Education & Training, Positioning  Safety Devices  Type of devices: Gait belt, Chair alarm in place, Call light within reach, All fall risk precautions in place, Left in chair     Therapy Time   Individual Concurrent Group Co-treatment   Time In 1100         Time Out 1200         Minutes 60              Second Session Therapy Time:   Individual Concurrent Group Co-treatment   Time In/OUT 1415/1435         Time IN / Out 1445/ 1500         Minutes 35           Timed Code Treatment Minutes:  60+35    Total Treatment Minutes:  Ava 25, PT

## 2020-12-29 NOTE — PROGRESS NOTES
Shift assessment complete. VSS. A/Ox4. Fall precautions in place. Norco given for 8/10 pain. Call light in reach. Will continue to monitor.      Vitals:    12/29/20 0754   BP: (!) 142/79   Pulse: 77   Resp: 16   Temp: 97.8 °F (36.6 °C)   SpO2:

## 2020-12-29 NOTE — CONSULTS
66 y.o. female who presents to subacute rehab to recover after a fall that resulted in a periprosthetic distal femur fracture s/p ORIF at [facility] by Dr. Kelly Kirk 12/21/20 with a lateral plate. She has been in inpatient acute rehab but back pain is now so severe that she passed out today. She has numbness in the right great toe and weakness as well. Her pain is sharp, severe, acute, radiating down into the right foot, worse with movement and relieved by nothing. She follows with Dr. Nasima Blanchard for pain management for epidural steroid injections. She denies bowel or bladder dysfunction, fevers, chills, chest pain, shortness of breath or any other acute symptoms    Daughter Chandler Castanon is a good contact.  Cell [phone]    Her health is otherwise significant for DM, factor V leiden, CKD, CAD, chronic back pain with       Past Medical History:   Diagnosis Date    Allergic     Anemia     Arthritis     Chronic kidney disease     Depression     Diabetes mellitus (HCC)     Disorders affecting multiple structures of eye     Epigastric pain     GERD (gastroesophageal reflux disease)     Hyperlipidemia     Hypertension     IBS (irritable bowel syndrome)     Intermittent atrial fibrillation ([address] 3/12/2011    Mass of lung     right    Migraine     Nausea & vomiting     Status post lung surgery 3/10/2011    Thyroid disease        Past Surgical History:   Procedure Laterality Date    APPENDECTOMY      BRAIN SURGERY      BUNIONECTOMY      CATARACT REMOVAL  2012    CHOLECYSTECTOMY      COLECTOMY      ENDOSCOPY, COLON, DIAGNOSTIC  8/22/2011    HYSTERECTOMY      JOINT REPLACEMENT      KNEE SURGERY Left 10/11/2016    THORACOTOMY  3/10/11    right thoracotomy;right video assissted thoroscopy biopsey of mediastinal mass for ffrozen  right lower lobe wedge resection for frozen; thymusectomy       Social History     Tobacco Use    Smoking status: Never Smoker    Smokeless tobacco: Never Used Substance Use Topics    Alcohol use: No    Drug use: No       family history includes Alcohol Abuse in her brother; Cancer in her sister; Coronary Art Dis in her brother and mother; Diabetes in her brother, mother, and sister; Elevated Lipids in her mother; Hypertension in her brother, father, mother, and sister; Kidney Disease in her mother and sister; Migraines in her brother, mother, and sister; Stroke in her father. No current facility-administered medications on file prior to encounter. Current Outpatient Medications on File Prior to Encounter   Medication Sig Dispense Refill    metFORMIN (GLUCOPHAGE) 500 MG tablet Take 1,000 mg by mouth Daily with supper      levothyroxine (SYNTHROID) 125 MCG tablet Take 125 mcg by mouth Daily      sertraline (ZOLOFT) 100 MG tablet Take 100 mg by mouth nightly      meloxicam (MOBIC) 7.5 MG tablet Take 7.5 mg by mouth daily      tiZANidine (ZANAFLEX) 2 MG tablet Take 2 mg by mouth daily as needed (Muscle spasms)      gabapentin (NEURONTIN) 300 MG capsule Take 300 mg by mouth 3 times daily.       hydroCHLOROthiazide (HYDRODIURIL) 25 MG tablet Take 25 mg by mouth daily      clopidogrel (PLAVIX) 75 MG tablet Take 75 mg by mouth Daily with supper      metoprolol succinate (TOPROL XL) 50 MG extended release tablet Take 50 mg by mouth daily      hyoscyamine (LEVBID) 375 MCG extended release tablet Take 375 mcg by mouth daily       HYDROcodone-acetaminophen (NORCO) 7.5-325 MG per tablet Take by mouth      colestipol (COLESTID) 1 G tablet Take 1 g by mouth 2 times daily      pravastatin (PRAVACHOL) 20 MG tablet Take 20 mg by mouth daily Takes 1/2 tablet=10mg      Diphenoxylate-Atropine (LOMOTIL PO) Take by mouth 2 times daily 2.5-0.025 mg tab      Cholecalciferol (VITAMIN D3) 51506 UNITS CAPS Take by mouth Takes 1 on tuesdays and another on saturdays  diphenhydrAMINE-APAP, sleep, (TYLENOL PM EXTRA STRENGTH)  MG tablet Take 1 tablet by mouth nightly as needed for Sleep.  alprazolam (XANAX) 0.5 MG tablet Take 0.5 mg by mouth 2 times daily.       metformin (GLUCOPHAGE) 500 MG tablet Take 500 mg by mouth daily (with breakfast)       omeprazole (PRILOSEC) 20 MG capsule Take 20 mg by mouth daily 40 mg in AM   20 mg at bedtime       Scheduled Meds:   magnesium oxide  800 mg Oral BID    potassium bicarb-citric acid  40 mEq Oral Daily    metFORMIN  500 mg Oral Daily with breakfast    metFORMIN  1,000 mg Oral Nightly    hydrocortisone   Rectal BID    pravastatin  10 mg Oral Nightly    diphenoxylate-atropine  1 tablet Oral BID    apixaban  2.5 mg Oral BID    insulin lispro  0-12 Units Subcutaneous TID WC    insulin lispro  0-6 Units Subcutaneous Nightly    clopidogrel  75 mg Oral Daily    cholestyramine light  4 g Oral Daily    diclofenac sodium  2 g Topical BID    vitamin D  50,000 Units Oral Weekly    estradiol  2 g Vaginal Once per day on Mon Wed Fri    gabapentin  300 mg Oral TID    hydroCHLOROthiazide  25 mg Oral Daily    metoprolol succinate  50 mg Oral Daily    levothyroxine  125 mcg Oral Daily    pantoprazole  40 mg Oral QAM AC    sertraline  100 mg Oral Daily    [Held by provider] bisacodyl  5 mg Oral Daily     Continuous Infusions:   dextrose       PRN Meds:.lactulose, benzocaine-menthol, bisacodyl, HYDROcodone 5 mg - acetaminophen, traMADol, ALPRAZolam, hyoscyamine, nitroGLYCERIN, tiZANidine, acetaminophen, magnesium hydroxide, polyethylene glycol, glucose, dextrose, glucagon (rDNA), dextrose      Allergies   Allergen Reactions    Aspirin      CONGESTION    Codeine      RESPIRATORY DISTRESS    Other      METAL-RASH    Oxycodone     Percocet [Oxycodone-Acetaminophen]      JITTERY    Trimethoprim     Vicodin [Hydrocodone-Acetaminophen]      JITTERY    Bactrim Rash    Dilaudid [Hydromorphone Hcl] Rash Pt reports rash and swelling in hands- PATIENT DOESN'T REMEMBER THIS REACTION AS OF 7/23/12    Penicillins Rash        Review of Systems   Constitutional: Negative. HENT: Negative. Eyes: Negative. Respiratory: Negative. Cardiovascular: Negative. Gastrointestinal: Positive for diarrhea. Endocrine: Negative. Genitourinary: Negative. Musculoskeletal: Positive for arthralgias. Skin: Negative. Allergic/Immunologic: Negative. Neurological: Negative. Hematological: Negative. Psychiatric/Behavioral: Negative. Physical Examination:  /63   Pulse 67   Temp 97.8 °F (36.6 °C) (Oral)   Resp 16   Ht 5' 2\" (1.575 m)   Wt 182 lb (82.6 kg) Comment: per pt  SpO2 98%   BMI 33.29 kg/m²     Physical Exam  Vitals signs reviewed. Constitutional:       Appearance: Normal appearance. She is normal weight. HENT:      Mouth/Throat:      Mouth: Mucous membranes are moist.      Pharynx: Oropharynx is clear. Cardiovascular:      Rate and Rhythm: Normal rate and regular rhythm. Pulses: Normal pulses. Pulmonary:      Effort: Pulmonary effort is normal. No respiratory distress. Abdominal:      General: Abdomen is flat. Palpations: Abdomen is soft. Tenderness: There is no abdominal tenderness. Skin:     General: Skin is warm and dry. Capillary Refill: Capillary refill takes less than 2 seconds. Neurological:      General: No focal deficit present. Mental Status: She is alert and oriented to person, place, and time. Mental status is at baseline. Psychiatric:         Mood and Affect: Mood normal.         Behavior: Behavior normal.         Thought Content: Thought content normal.         Judgment: Judgment normal.       Lumbar Spine:  Inspection: No deformity  Palpation: paraspinal tenderness. No bony tenderness or stepoffs  Range of Motion: limited exam due to in bed  Strength: 5/5 L2-S1 myotomes except right 4-/5 ehl and 4+/5 DF. Sensation: Intact and symmetric all lower extremity dermatomes except L5  Reflexes: Negative clonus, negative babinski    Left lower extremity  Strength: 5/5 L2-S1 myotomes except 4-/5 ehl and 4+/5 DF. Sensation: Intact and symmetric all lower extremity dermatomes except L5 is decreased    Right lower extremity  Range of motion good at the hip and knee, well tolerated with no significant pain  Strength: 5/5 L2-S1 myotomes except 4-/5 ehl and 4+/5 DF. Sensation: Intact and symmetric all lower extremity dermatomes except L5 is decreased  Palpation: non-tender throughout    Imaging:  X ray images of the hip were reviewed and interpreted by me today and are significant for:  Benign appearing hips, and noted DJD of the lumbar spine    X ray images of the lumbar spine were ordered, reviewed and interpreted by me today and are significant for:  Multilevel degenerative spondylosis, no acute changes compared to prior MRI, no fracture, dislocation or other acute pathology    Outside lumbar MRI from [address] [place] [address] Road 12/17/20 prior to her fall, images and report reviewed and are significant for multi-level degenerative disc herniations in the lumbar spine, degenerative spondylosis, with grossly maintained lordosis, no significant listhesis, very significant disc herniations at L3-4 with mild foraminal stenosis, and L4-5 with additional facet cyst causing moderate canal stenosis and significant right sided L4-5 right foraminal stenosis. There is also significant paraspinal muscular edema in the lumbar spine suggesting chronic strain. Labs:    Lab Results   Component Value Date/Time    CREATININE 1.1 12/29/2020 03:29 PM    HGB 8.3 (L) 12/29/2020 03:29 PM   Glc ranging 131-190    MDM:  Acute worsening problem with workup planned  Multiple comorbidities  Imaging ordered and reviewed  Medications prescribed  Chart reviewed  Outside imaging obtained, reviewed images and reports        KUN Taylor MD [facility]  Office: [phone]  Cell: [phone]    12/29/20  6:44 PM

## 2020-12-29 NOTE — PROGRESS NOTES
Other Apparatus: Wheelchair follow, Knee Immobilizer  Assistance: Contact guard assistance  Distance: 7 steps and then 12 steps. Pt c/o not feeling \"right\" and requested to stop. Pt appeared diaphoretic . SOB pronounced. /75, 98% SpO2, 88 HR. Following sitting x 5 minutes, 135/58, Spo2 dropped < 89% but came up quickly, HR 82. OT  PT Equipment Recommendations  Other: Ongoing assessment at this time  Toilet - Technique: Stand pivot  Equipment Used: Raised toilet seat with rails  Toilet Transfers Comments: use of GB; VCs to maintain WBing precautions and VCs for hand placement when pivoting  Assessment        SLP          Body mass index is 33.29 kg/m². Assessment and Plan:   Ms. Aurea Blake will require ongoing medical management and daily physician oversight for the followin. Left femur fracture. Test post ORIF 2020. Patient will proceed with treatment per orthopedic protocol. - PT/OT required      2. Diabetes. Patient will continue with bolus correction regimen. Adjust as required once oral take is increased.      3. Coagulopathy. Patient has a history of heterozygous factor V Leiden deficiency. Patient has started Eliquis 2.5 mg by mouth twice a day per hematology recommendations.     4. Coronary artery disease. Clopidogrel has been restarted.      5. UTI. Patient's preprocedure urinalysis is consistent with urinary tract infection, greater than 100,000 CFU Klebsiella pneumonia. She continues on 1 g IV daily pending culture results. 6. Normocytic anemia. Hemoglobin on admission was 11.8.  It has decreased to 8.7 on  and 7.3 on . Continue to monitor.     Rehab Progress: Improving  Anticipated Dispo: home  Services/DME: REFUGIO  ELOS: REFUGIO Martin D.O. M.P.H  PM&R  2020  10:01 AM

## 2020-12-29 NOTE — PROGRESS NOTES
Pt in bed, awake watching television. Complains of surgical pain to left hip. Vitals and assessment completed. Nighttime medications given including Tramadol for pain. Pt repositioned in bed. Reminded pt to call for assistance with anyy needs. Call light within reach. Safety measures in place. No additional needs at this time.

## 2020-12-29 NOTE — PLAN OF CARE
Nutrition Problem #1: Inadequate oral intake  Intervention: Food and/or Nutrient Delivery: Continue Current Diet  Nutritional Goals: Pt will consume >50% of meals this admission

## 2020-12-29 NOTE — PLAN OF CARE
Problem: Pain:  Description: Pain management should include both nonpharmacologic and pharmacologic interventions. Goal: Control of acute pain  Description: Control of acute pain  12/29/2020 0930 by Demetria Fox RN  Outcome: Ongoing  Note: Quin Liner given for 8/10 knee pain before therapy. Patient satisfied. Problem: Falls - Risk of:  Goal: Will remain free from falls  Description: Will remain free from falls  12/29/2020 0930 by Demetria Fox RN  Outcome: Ongoing  Note: No falls since admission. Patient A/Ox4. Fall precautions in place. Call light in reach. Calls out for assistance. Chair alarm activated. Non skid footwear on. Problem: Skin Integrity:  Goal: Will show no infection signs and symptoms  Description: Will show no infection signs and symptoms  12/29/2020 0930 by Demetria Fox RN  Outcome: Ongoing  Note: Surgical site is clean/dry/intact.

## 2020-12-29 NOTE — FLOWSHEET NOTE
Spiritual Care Note  Respond to code page; offered silent prayer for pt and staff; pt responsive to care.   12/29/2020  risa Wilder [facility]     12/29/20 8260   Encounter Summary   Services provided to: Patient not available   Referral/Consult From: Nurse   Support System Unknown   Continue Visiting   (LO 12/29 Code)   Complexity of Encounter Moderate   Length of Encounter 15 minutes   Crisis   Type Code   Assessment Unable to respond   Intervention Sustaining presence/ Ministry of presence   Outcome De-escalated  (pt responsive; staff offering care)   Spiritual/Church   Type   (silent prayer for pt and staff)

## 2020-12-29 NOTE — PROGRESS NOTES
Occupational Therapy  Facility/Department: [facility] ACUTE REHAB UNIT  Daily Treatment Note  NAME: Kvng Silveira  : 1945  MRN: [id number]    Date of Service: 2020    Discharge Recommendations:  Continue to assess pending progress, 24 hour supervision or assist, Home with Home health OT  OT Equipment Recommendations  Equipment Needed: Yes  ADL Assistive Devices: Shower Chair with back  Other: continue to assess pending progress    Assessment   Performance deficits / Impairments: Decreased functional mobility ; Decreased endurance;Decreased ADL status; Decreased balance;Decreased strength;Decreased high-level IADLs;Decreased fine motor control  Assessment: Pt demonstrated dramatic improvements with ADLs this date progressing to setup for UB dressing, CGA for LB bathing and min A for LB dressing. Pt also able to complete all components of toileting with CGA. Pt proud of improvements and stating she can tell she is \"getting the hang of things. \" Pt continues to function significantly below baselin, cont OT POC. Treatment Diagnosis: decreased ADL status, functional transfers and functional mobility 2/2 L distal femur fx  Prognosis: Good  OT Education: Plan of Care;Precautions; ADL Adaptive Strategies;Transfer Training  Patient Education: pt instructed on use of reacher for LB dressing-verb and demo understanding, continue to reinforce  REQUIRES OT FOLLOW UP: Yes  Activity Tolerance  Activity Tolerance: Patient Tolerated treatment well  Safety Devices  Safety Devices in place: Yes  Type of devices: Left in chair;Call light within reach; Chair alarm in place; All fall risk precautions in place         Patient Diagnosis(es): There were no encounter diagnoses. has a past medical history of Allergic, Anemia, Arthritis, Chronic kidney disease, Depression, Diabetes mellitus ([address] Disorders affecting multiple structures of eye, Epigastric pain, GERD (gastroesophageal reflux disease), Hyperlipidemia, Hypertension, IBS (irritable bowel syndrome), Intermittent atrial fibrillation ([address] Mass of lung, Migraine, Nausea & vomiting, Status post lung surgery, and Thyroid disease. has a past surgical history that includes brain surgery; joint replacement; Cholecystectomy; Bunionectomy; Hysterectomy; thoracotomy (3/10/11); Endoscopy, colon, diagnostic (8/22/2011); Appendectomy; colectomy; Cataract removal (2012); and knee surgery (Left, 10/11/2016). Restrictions  Restrictions/Precautions  Restrictions/Precautions: Weight Bearing  Required Braces or Orthoses?: Yes(L knee immobilizer)  Lower Extremity Weight Bearing Restrictions  Left Lower Extremity Weight Bearing: Foot Flat Weight Bearing(orders say both TTWB and flat foot WBing)  Position Activity Restriction  Other position/activity restrictions: L TTWB and foot flat WB listed, L knee immobilizer on at all times  Subjective   General  Chart Reviewed: Yes  Patient assessed for rehabilitation services?: Yes  Additional Pertinent Hx: 67 y/o F with mechanical fall at home in bathroom. Imaging revealed left distal femur fracture and is now TTWB on LLE. PMH significant for chronic back pain, bilateral total knee arthroplasties, & Diabetes  Family / Caregiver Present: No  Referring Practitioner:  [facility]  Diagnosis: L distal femur fracture  Subjective  Subjective: Pt sitting in w/c upon arrival, pleasant and agreeable to OT session requesting shower and to toilet.   Vital Signs  Patient Currently in Pain: Yes(6/10 surgical LLE)     Orientation  Orientation  Overall Orientation Status: Within Functional Limits  Objective    ADL  UE Bathing: Stand by assistance(seated on TTB; front pericare with lateral WS) LE Bathing: Minimal assistance(seated on TTB; lateral WS for rear perihygiene; allyson to wash R foot, anticipate improvement with LHS, CGA in stance to dry periarea with unilateral UE support)  UE Dressing: Setup(pt donned bra and shirt with setup)  LE Dressing: Minimal assistance(pt donned brief using reacher to thread over LLE with assist to manage LLE d/t knee immobilizer; pt donned shorts with CGA to thread BLEs and in stance to manage over hips with + time, effortful and VCs to maintain WBing precautions on LLE)  Toileting: Contact guard assistance(pt continent of loose watery stool; + time to void; pt completed LB clothing mgmt off hips in stance with CGA and unilateral UE support at ; pericare hygiene completed with lateral WS seated)        Balance  Sitting Balance: Independent(on commode and TTB)  Standing Balance: Contact guard assistance  Standing Balance  Time: ~2 minutes total  Activity: functional transfers; toileting/ LB ADLs  Toilet Transfers  Toilet - Technique: Stand pivot  Equipment Used: Raised toilet seat with rails  Toilet Transfer: Contact guard assistance  Toilet Transfers Comments: use of GB; VCs to maintain [address] precautions  Shower Transfers  Shower - Transfer From: Wheelchair  Shower - Transfer Type: To and From  Shower - Transfer To:  Transfer tub bench  Shower - Technique: Stand pivot  Shower Transfers: Contact Guard  Shower Transfers Comments: use of GB     Transfers  Sit to stand: Contact guard assistance(from TTB)  Stand to sit: Contact guard assistance(to TTB and w/c)                       Cognition  Overall Cognitive Status: [facility] Second Session: Pt sitting in w/c upon arrival c/o 8/10 shooting/burning pain from R sided low back down to R calf, RN notified and administered pain meds during session. Pt requesting to use bathroom for BM. Pt encouraged to complete w/c mobility to bathroom and setup for toilet transfer but required min A and VCs to back into bathroom to clear threshold of door. Pt getting easily frustrated with difficulty maneuvering in small spaces and managing L leg rest in bathroom, pt required mod A overall for setup of toilet transfer. Pt completed toilet transfer onto RTS with CGA using GB for stand pivot. Pt managed LB clothing on/off hips with CGA and + time to maneuver over L KI. Pt continent of loose watery stool req + time to complete. Pt completed pericare seated with lateral WS with LLE propped onto high foot stool for comfort d/t extended time spent seated. Stand pivot from RTS<w/c with CGA, VCs to maintain TTWB. Pt washed hands with setup to provide soap d/t difficulty getting w/c close enough to sink d/t L leg rest. Pt left in w/c at end of session with chair alarm engaged, call light within reach and all needs met.        Plan   Plan  Times per week: 90 minutes per day 5 days/week  Times per day: Daily  Current Treatment Recommendations: Strengthening, Patient/Caregiver Education & Training, Home Management Training, Equipment Evaluation, Education, & procurement, Balance Training, Functional Mobility Training, Endurance Training, Safety Education & Training, Self-Care / ADL, Wheelchair Mobility Training  G-Code     OutComes Score                                                  AM-PAC Score             Goals  Short term goals  Time Frame for Short term goals: STG=LTG  Long term goals  Time Frame for Long term goals : 2 weeks-all ongoing  Long term goal 1: Pt will complete LB dressing with use of AE prn with spvn  Long term goal 2: Pt will complete UB dressing with mod I Long term goal 3: Pt will complete bathing with spvn  Long term goal 4: Pt will complete toilet transfer with spvn maintaining weightbearing restrictions  Long term goal 5: Pt will complete toileting with spvn  Patient Goals   Patient goals :  To be more independent       Therapy Time   Individual Second [facility] Co-treatment   Time In 0830  1245       Time Out 0938  1315       Minutes 68  30       Timed Code Treatment Minutes: 68 Minutes+ 30 minutes= 98 minutes       Sami Lozano, OT

## 2020-12-30 LAB
ANION GAP SERPL CALCULATED.3IONS-SCNC: 14 MMOL/L (ref 3–16)
BASOPHILS ABSOLUTE: 0.1 K/UL (ref 0–0.2)
BASOPHILS RELATIVE PERCENT: 0.7 %
BUN BLDV-MCNC: 14 MG/DL (ref 7–20)
CALCIUM SERPL-MCNC: 8.8 MG/DL (ref 8.3–10.6)
CHLORIDE BLD-SCNC: 95 MMOL/L (ref 99–110)
CO2: 23 MMOL/L (ref 21–32)
CREAT SERPL-MCNC: 0.9 MG/DL (ref 0.6–1.2)
EOSINOPHILS ABSOLUTE: 1 K/UL (ref 0–0.6)
EOSINOPHILS RELATIVE PERCENT: 10.3 %
GFR AFRICAN AMERICAN: >60
GFR NON-AFRICAN AMERICAN: >60
GLUCOSE BLD-MCNC: 119 MG/DL (ref 70–99)
GLUCOSE BLD-MCNC: 146 MG/DL (ref 70–99)
GLUCOSE BLD-MCNC: 154 MG/DL (ref 70–99)
GLUCOSE BLD-MCNC: 167 MG/DL (ref 70–99)
HCT VFR BLD CALC: 26.2 % (ref 36–48)
HEMOGLOBIN: 8.8 G/DL (ref 12–16)
LYMPHOCYTES ABSOLUTE: 2.5 K/UL (ref 1–5.1)
LYMPHOCYTES RELATIVE PERCENT: 26.5 %
MCH RBC QN AUTO: 30.4 PG (ref 26–34)
MCHC RBC AUTO-ENTMCNC: 33.4 G/DL (ref 31–36)
MCV RBC AUTO: 90.9 FL (ref 80–100)
MONOCYTES ABSOLUTE: 1.4 K/UL (ref 0–1.3)
MONOCYTES RELATIVE PERCENT: 15 %
NEUTROPHILS ABSOLUTE: 4.5 K/UL (ref 1.7–7.7)
NEUTROPHILS RELATIVE PERCENT: 47.5 %
PDW BLD-RTO: 15.7 % (ref 12.4–15.4)
PERFORMED ON: ABNORMAL
PLATELET # BLD: 415 K/UL (ref 135–450)
PMV BLD AUTO: 8.7 FL (ref 5–10.5)
POTASSIUM REFLEX MAGNESIUM: 3.6 MMOL/L (ref 3.5–5.1)
RBC # BLD: 2.88 M/UL (ref 4–5.2)
SODIUM BLD-SCNC: 132 MMOL/L (ref 136–145)
TROPONIN: 0.02 NG/ML
WBC # BLD: 9.5 K/UL (ref 4–11)

## 2020-12-30 PROCEDURE — 80048 BASIC METABOLIC PNL TOTAL CA: CPT

## 2020-12-30 PROCEDURE — 97530 THERAPEUTIC ACTIVITIES: CPT

## 2020-12-30 PROCEDURE — 84484 ASSAY OF TROPONIN QUANT: CPT

## 2020-12-30 PROCEDURE — 97542 WHEELCHAIR MNGMENT TRAINING: CPT | Performed by: PHYSICAL THERAPIST

## 2020-12-30 PROCEDURE — 6370000000 HC RX 637 (ALT 250 FOR IP): Performed by: STUDENT IN AN ORGANIZED HEALTH CARE EDUCATION/TRAINING PROGRAM

## 2020-12-30 PROCEDURE — 85025 COMPLETE CBC W/AUTO DIFF WBC: CPT

## 2020-12-30 PROCEDURE — 97110 THERAPEUTIC EXERCISES: CPT

## 2020-12-30 PROCEDURE — 1280000000 HC REHAB R&B

## 2020-12-30 PROCEDURE — 97116 GAIT TRAINING THERAPY: CPT | Performed by: PHYSICAL THERAPIST

## 2020-12-30 PROCEDURE — 36415 COLL VENOUS BLD VENIPUNCTURE: CPT

## 2020-12-30 PROCEDURE — 6370000000 HC RX 637 (ALT 250 FOR IP): Performed by: PHYSICAL MEDICINE & REHABILITATION

## 2020-12-30 PROCEDURE — 97535 SELF CARE MNGMENT TRAINING: CPT

## 2020-12-30 PROCEDURE — 97530 THERAPEUTIC ACTIVITIES: CPT | Performed by: PHYSICAL THERAPIST

## 2020-12-30 PROCEDURE — 97110 THERAPEUTIC EXERCISES: CPT | Performed by: PHYSICAL THERAPIST

## 2020-12-30 RX ORDER — HYDROCODONE BITARTRATE AND ACETAMINOPHEN 5; 325 MG/1; MG/1
2 TABLET ORAL EVERY 6 HOURS PRN
Status: DISCONTINUED | OUTPATIENT
Start: 2020-12-30 | End: 2021-01-08 | Stop reason: HOSPADM

## 2020-12-30 RX ADMIN — HYDROCORTISONE 2.5%: 25 CREAM TOPICAL at 21:18

## 2020-12-30 RX ADMIN — DIPHENOXYLATE HYDROCHLORIDE AND ATROPINE SULFATE 1 TABLET: 2.5; .025 TABLET ORAL at 08:18

## 2020-12-30 RX ADMIN — HYDROCODONE BITARTRATE AND ACETAMINOPHEN 2 TABLET: 5; 325 TABLET ORAL at 21:15

## 2020-12-30 RX ADMIN — HYDROCHLOROTHIAZIDE 25 MG: 25 TABLET ORAL at 08:19

## 2020-12-30 RX ADMIN — PANTOPRAZOLE SODIUM 40 MG: 40 TABLET, DELAYED RELEASE ORAL at 06:50

## 2020-12-30 RX ADMIN — GABAPENTIN 300 MG: 300 CAPSULE ORAL at 14:28

## 2020-12-30 RX ADMIN — HYDROCODONE BITARTRATE AND ACETAMINOPHEN 1 TABLET: 5; 325 TABLET ORAL at 08:26

## 2020-12-30 RX ADMIN — METOPROLOL SUCCINATE 50 MG: 50 TABLET, EXTENDED RELEASE ORAL at 09:17

## 2020-12-30 RX ADMIN — GABAPENTIN 300 MG: 300 CAPSULE ORAL at 21:15

## 2020-12-30 RX ADMIN — LEVOTHYROXINE SODIUM 125 MCG: 0.03 TABLET ORAL at 07:01

## 2020-12-30 RX ADMIN — APIXABAN 2.5 MG: 5 TABLET, FILM COATED ORAL at 21:17

## 2020-12-30 RX ADMIN — APIXABAN 2.5 MG: 5 TABLET, FILM COATED ORAL at 08:19

## 2020-12-30 RX ADMIN — Medication 800 MG: at 08:18

## 2020-12-30 RX ADMIN — HYDROCORTISONE 2.5%: 25 CREAM TOPICAL at 08:22

## 2020-12-30 RX ADMIN — ALPRAZOLAM 0.5 MG: 0.5 TABLET ORAL at 23:25

## 2020-12-30 RX ADMIN — METFORMIN HYDROCHLORIDE 500 MG: 500 TABLET ORAL at 08:19

## 2020-12-30 RX ADMIN — DICLOFENAC 2 G: 10 GEL TOPICAL at 08:22

## 2020-12-30 RX ADMIN — GABAPENTIN 300 MG: 300 CAPSULE ORAL at 08:19

## 2020-12-30 RX ADMIN — TIZANIDINE 2 MG: 4 TABLET ORAL at 17:12

## 2020-12-30 RX ADMIN — DICLOFENAC 2 G: 10 GEL TOPICAL at 21:18

## 2020-12-30 RX ADMIN — PRAVASTATIN SODIUM 10 MG: 10 TABLET ORAL at 21:17

## 2020-12-30 RX ADMIN — TRAMADOL HYDROCHLORIDE 50 MG: 50 TABLET, COATED ORAL at 23:24

## 2020-12-30 RX ADMIN — TRAMADOL HYDROCHLORIDE 50 MG: 50 TABLET, COATED ORAL at 11:52

## 2020-12-30 RX ADMIN — METFORMIN HYDROCHLORIDE 1000 MG: 1000 TABLET ORAL at 21:17

## 2020-12-30 RX ADMIN — SERTRALINE HYDROCHLORIDE 100 MG: 100 TABLET ORAL at 08:19

## 2020-12-30 RX ADMIN — HYDROCODONE BITARTRATE AND ACETAMINOPHEN 1 TABLET: 5; 325 TABLET ORAL at 14:28

## 2020-12-30 RX ADMIN — TRAMADOL HYDROCHLORIDE 50 MG: 50 TABLET, COATED ORAL at 17:12

## 2020-12-30 RX ADMIN — CLOPIDOGREL BISULFATE 75 MG: 75 TABLET ORAL at 08:19

## 2020-12-30 ASSESSMENT — PAIN DESCRIPTION - ONSET
ONSET: ON-GOING
ONSET: ON-GOING

## 2020-12-30 ASSESSMENT — PAIN DESCRIPTION - DESCRIPTORS: DESCRIPTORS: BURNING

## 2020-12-30 ASSESSMENT — PAIN - FUNCTIONAL ASSESSMENT
PAIN_FUNCTIONAL_ASSESSMENT: PREVENTS OR INTERFERES SOME ACTIVE ACTIVITIES AND ADLS
PAIN_FUNCTIONAL_ASSESSMENT: PREVENTS OR INTERFERES SOME ACTIVE ACTIVITIES AND ADLS

## 2020-12-30 ASSESSMENT — PAIN DESCRIPTION - PROGRESSION: CLINICAL_PROGRESSION: NOT CHANGED

## 2020-12-30 ASSESSMENT — PAIN SCALES - GENERAL
PAINLEVEL_OUTOF10: 8
PAINLEVEL_OUTOF10: 6
PAINLEVEL_OUTOF10: 9
PAINLEVEL_OUTOF10: 8
PAINLEVEL_OUTOF10: 9
PAINLEVEL_OUTOF10: 9
PAINLEVEL_OUTOF10: 10

## 2020-12-30 ASSESSMENT — PAIN DESCRIPTION - ORIENTATION
ORIENTATION: RIGHT
ORIENTATION: RIGHT

## 2020-12-30 ASSESSMENT — PAIN DESCRIPTION - LOCATION: LOCATION: BACK;LEG;HIP

## 2020-12-30 NOTE — PROGRESS NOTES
Department of Physical Medicine & Rehabilitation  Progress Note    Patient Identification:  Ana Monae  [id number]  : 1945  Admit date: 2020    Chief Complaint: L hip fracture     Subjective:   JULIETA Orosco called yesterday for suspected Vasovagal episode. She apparently was not responding to verbal or sternal rub. She eventually became alert and oriented and responded to questions appropriately. She states this is not the first time she has had a vasovagal episode due to pain. She also had a discussion with Orthopedic surgery yesterday about her uncontrolled back pain. She states she is leaning towards epidural. Still continues to have several loose BMS. Appetite good and no issues with sleeping. Participating well in therapy. ROS: No f/c, n/v, cp     Objective:  Patient Vitals for the past 24 hrs:   BP Temp Temp src Pulse Resp SpO2   20 0857 (!) 147/78        20 0826  98.1 °F (36.7 °C) Oral 68 18 95 %   20 2210 122/72 97.9 °F (36.6 °C) Oral 73 16 94 %   20 1600 106/63   67     20 1535 97/61   65     20 1530 106/67   67       Const: Alert. No distress, pleasant. HEENT: Normocephalic, atraumatic. Normal sclera/conjunctiva. MMM. CV: Regular rate and rhythm. Resp: No respiratory distress. Lungs CTAB. Abd: Soft, nontender, nondistended, NABS+   Ext: + edema. Neuro: Alert, oriented, appropriately interactive. Psych: Cooperative, appropriate mood and affect    Laboratory data: Available via EMR.    Last 24 hour lab  Recent Results (from the past 24 hour(s))   POCT Glucose    Collection Time: 20 12:02 PM   Result Value Ref Range    POC Glucose 131 (H) 70 - 99 mg/dl    Performed on ACCU-CHEK    CBC auto differential    Collection Time: 20  3:29 PM   Result Value Ref Range    WBC 12.9 (H) 4.0 - 11.0 K/uL    RBC 2.76 (L) 4.00 - 5.20 M/uL    Hemoglobin 8.3 (L) 12.0 - 16.0 g/dL    Hematocrit 25.4 (L) 36.0 - 48.0 % MCV 92.1 80.0 - 100.0 fL    MCH 30.2 26.0 - 34.0 pg    MCHC 32.8 31.0 - 36.0 g/dL    RDW 16.2 (H) 12.4 - 15.4 %    Platelets 731 195 - 999 K/uL    MPV 8.9 5.0 - 10.5 fL    Neutrophils % 63.5 %    Lymphocytes % 16.4 %    Monocytes % 13.2 %    Eosinophils % 6.0 %    Basophils % 0.9 %    Neutrophils Absolute 8.2 (H) 1.7 - 7.7 K/uL    Lymphocytes Absolute 2.1 1.0 - 5.1 K/uL    Monocytes Absolute 1.7 (H) 0.0 - 1.3 K/uL    Eosinophils Absolute 0.8 (H) 0.0 - 0.6 K/uL    Basophils Absolute 0.1 0.0 - 0.2 K/uL   Basic Metabolic Panel w/ Reflex to MG    Collection Time: 12/29/20  3:29 PM   Result Value Ref Range    Sodium 131 (L) 136 - 145 mmol/L    Potassium reflex Magnesium 3.2 (L) 3.5 - 5.1 mmol/L    Chloride 94 (L) 99 - 110 mmol/L    CO2 22 21 - 32 mmol/L    Anion Gap 15 3 - 16    Glucose 190 (H) 70 - 99 mg/dL    BUN 15 7 - 20 mg/dL    CREATININE 1.1 0.6 - 1.2 mg/dL    GFR Non- 48 (A) >60    GFR  58 (A) >60    Calcium 8.8 8.3 - 10.6 mg/dL   Troponin    Collection Time: 12/29/20  3:29 PM   Result Value Ref Range    Troponin 0.02 (H) <0.01 ng/mL   D-dimer, quantitative    Collection Time: 12/29/20  3:29 PM   Result Value Ref Range    D-Dimer, Quant 481 (H) 0 - 229 ng/mL DDU   Magnesium    Collection Time: 12/29/20  3:29 PM   Result Value Ref Range    Magnesium 1.40 (L) 1.80 - 2.40 mg/dL   EKG 12 Lead    Collection Time: 12/29/20  3:37 PM   Result Value Ref Range    Ventricular Rate 65 BPM    Atrial Rate 65 BPM    P-R Interval 166 ms    QRS Duration 80 ms    Q-T Interval 438 ms    QTc Calculation (Bazett) 455 ms    P Axis 59 degrees    R Axis 84 degrees    T Axis 23 degrees    Diagnosis       Normal sinus rhythmNonspecific ST and T wave abnormalityAbnormal ECGConfirmed by Jr Post ([id number]) on 12/29/2020 4:46:36 PM   POCT Glucose    Collection Time: 12/29/20  4:38 PM   Result Value Ref Range    POC Glucose 184 (H) 70 - 99 mg/dl    Performed on ACCU-CHEK    POCT Glucose Stand to sit: Contact guard assistance(VC for hand placement)  Device: Rolling Walker  Other Apparatus: Wheelchair follow, Knee Immobilizer  Assistance: Contact guard assistance  Distance: 25' 15'  OT  PT Equipment Recommendations  Other: Ongoing assessment at this time  Toilet - Technique: Stand pivot  Equipment Used: Raised toilet seat with rails  Toilet Transfers Comments: use of GB; VCs to maintain WBing precautions  Assessment        SLP          Body mass index is 33.29 kg/m². Assessment and Plan:   Ms. Mike Odell will require ongoing medical management and daily physician oversight for the followin. Left femur fracture. - s/p ORIF 2020. Patient will proceed with treatment per orthopedic protocol.   - PT/OT required      2. Diabetes. - home  Metformin started. D/C insulin     3. Coagulopathy. (heterozygous factor V Leiden deficiency). - continue Eliquis 2.5 mg by mouth twice a day per hematology recommendations.     4. Coronary artery disease.   - continue Clopidogrel      5. UTI. Patient's preprocedure urinalysis is consistent with urinary tract infection, greater than 100,000 CFU Klebsiella pneumonia. - Continue Rocephin 1 g IV   - pending UCx     6. Normocytic anemia. Hemoglobin on admission was 11.8.   - Hgb on admission 11.8, Hgb 8.8 today.  Continue to monitor.     Rehab Progress: Improving  Anticipated Dispo: home  Services/DME: TBD  ELOS: TBD    Will staff patient with Dr. Johnie Meyers MD, PGY1    JELANI Tejada.O. M.P.H  PM&R  2020  9:15 AM

## 2020-12-30 NOTE — PROGRESS NOTES
Spoke with consulted Ortho MD and Dr. Alan Romo in reference to plan for pt's pain. Pt was crying in right leg pain, medicated with tramadol and zanaflex. From the 2 recommendations, MD stated he would start prednisone taper tomorrow, and increase norco to 2 tabs. Went over next steps with patient. Will continue to monitor.

## 2020-12-30 NOTE — PROGRESS NOTES
Pt assessment and vitals completed. Medications administered as ordered. Pt has no complaints at this time, resting comfortably in bed. Will continue to monitor.

## 2020-12-30 NOTE — PLAN OF CARE
Problem: Pain:  Goal: Control of acute pain  Description: Control of acute pain  Outcome: Ongoing  Note: Pt voices pain needs appropriately, pain is assessed during shift. Problem: Falls - Risk of:  Goal: Will remain free from falls  Description: Will remain free from falls  Outcome: Ongoing  Note: Client remains free from falls, bed/chair alarm in place, door open, encouraged to use call light for needs, call light is within reach, bed locked in lowest position,  Will continue to monitor. Problem: Skin Integrity:  Goal: Will show no infection signs and symptoms  Description: Will show no infection signs and symptoms  Outcome: Ongoing  Note: Surgical site observed for signs/symptoms of infection. Vitals performed routinely, labs observed.  Will monitor pt while on ARU

## 2020-12-30 NOTE — PROGRESS NOTES
Occupational Therapy  Facility/Department: [facility] ACUTE REHAB UNIT  Daily Treatment Note  NAME: Freda Mckinley  : 1945  MRN: [id number]    Date of Service: 2020    Discharge Recommendations:  Continue to assess pending progress, 24 hour supervision or assist, Home with Home health OT  OT Equipment Recommendations  Equipment Needed: Yes  ADL Assistive Devices: Shower Chair with back  Other: continue to assess pending progress    Assessment   Performance deficits / Impairments: Decreased functional mobility ; Decreased endurance;Decreased ADL status; Decreased balance;Decreased strength;Decreased high-level IADLs;Decreased fine motor control  Assessment: Pt session limited d/t extreme pain in RLE and pt very discouraged about her ability to progress with therapy d/t anxiety with RLE giving out (which she reports as the likely reason for her fall). Pt demonstrates decreased  strength making opening and closing of containers difficult for her when using R hand, but pt already using compensatory strategies as a result. Pt progressing with w/c propulsion but continues to require cues and + time to navigate small spaces and environmental barriers. Pt functioning signifiantly below baseline, cont OT POC. Treatment Diagnosis: decreased ADL status, functional transfers and functional mobility 2/2 L distal femur fx  Prognosis: Good  OT Education: Plan of Care;Precautions; ADL Adaptive Strategies; Home Exercise Program  Patient Education: pt instructed on HEP for  stength and provided with theraputty-pt verb understanding  REQUIRES OT FOLLOW UP: Yes  Activity Tolerance  Activity Tolerance: Patient Tolerated treatment well  Activity Tolerance: Pt demo SOB and easily fatigued with w/c propulsion d/t decreased activity tolerance  Safety Devices  Safety Devices in place: Yes  Type of devices: Left in chair;Call light within reach; Chair alarm in place; All fall risk precautions in place Patient Diagnosis(es): There were no encounter diagnoses. has a past medical history of Allergic, Anemia, Arthritis, Chronic kidney disease, Depression, Diabetes mellitus ([address] Disorders affecting multiple structures of eye, Epigastric pain, GERD (gastroesophageal reflux disease), Hyperlipidemia, Hypertension, IBS (irritable bowel syndrome), Intermittent atrial fibrillation ([address] Mass of lung, Migraine, Nausea & vomiting, Status post lung surgery, and Thyroid disease. has a past surgical history that includes brain surgery; joint replacement; Cholecystectomy; Bunionectomy; Hysterectomy; thoracotomy (3/10/11); Endoscopy, colon, diagnostic (8/22/2011); Appendectomy; colectomy; Cataract removal (2012); and knee surgery (Left, 10/11/2016). Restrictions  Restrictions/Precautions  Restrictions/Precautions: Weight Bearing  Required Braces or Orthoses?: Yes(L knee immobilizer)  Lower Extremity Weight Bearing Restrictions  Left Lower Extremity Weight Bearing: Foot Flat Weight Bearing(orders say both TTWB and flat foot WBing)  Position Activity Restriction  Other position/activity restrictions: L TTWB and foot flat WB listed, L knee immobilizer on at all times  Subjective   General  Chart Reviewed: Yes  Patient assessed for rehabilitation services?: Yes  Additional Pertinent Hx: 67 y/o F with mechanical fall at home in bathroom. Imaging revealed left distal femur fracture and is now TTWB on LLE. PMH significant for chronic back pain, bilateral total knee arthroplasties, & Diabetes  Family / Caregiver Present: No  Referring Practitioner: Dr Pinky Storey  Diagnosis: L distal femur fracture  Subjective  Subjective: Pt sitting in w/c upon arrival, pleasant and agreeable to OT session but stating 8/10 radiating pain down RLE.   Pain Assessment  Pain Level: 8  Pain Location: Leg;Back;Knee  Pain Orientation: Right  Pain Descriptors: Burning;Radiating  Vital Signs  Patient Currently in Pain: Yes   Orientation  Orientation Overall Orientation Status: Within Functional Limits  Objective    ADL  LE Dressing: Minimal assistance;Setup(pt doffed R sock using tall foot stool and donned new sock with setup; pt donned shoe req min A for heel mgmt, pt able to tie shoes with foot propped on foot stool); pt provided with tenso shape for RLE edema as foot was swollen, OT donned with Total A for sake of time        Functional Mobility  Functional - Mobility Device: Wheelchair  Activity: To/From therapy gym; Other(dining room ~220 feet total)  Assist Level: Supervision  Functional Mobility Comments: Use of BUEs only, pt provided with foot rest for RLE d/t pain; VCs to lock/unlock brakes              Coordination  Fine Motor: Pt engaged in opening/closing various lids of containers as pt stated increasing difficulty with this task d/t decreased  strength in R hand d/t arthritis; pt able to open 3/6 using R hand and compensated by using L hand as main force  while R hand stabilized on cap/lid              Cognition  Overall Cognitive Status: [facility]              Additional Activities Comment  Additional Activities: Pt engaged in item retrieval at w/c level in dining room to assess safety with manuevering obstacles and managing w/c; pt completed with SBA req VCs for which wheel to use to turn/readjust in order to get closer to surface to retrieve items; pt req max VCs to engage w/c brakes prior to completing dynamic reaching tasks to retrieve items from the fridge and from below waist level cabinet; pt req + time to maneuver w/c especially d/t bilateral foot rests only using BUEs to turn; pt easily fatigued requiring frequent breaks d/t decreased activity tolerance and demo CRAIG     Exercises Other: Pt completed the following strengthening exercises using light resistance (tan) theraputty d/t pt complaints of increasing weakness in R hand d/t arthritis: x8 gross grasp and release; x8 pincer grasp, x  8 finger adduction; pt also instructed to retrieve small beads from tabletop using R pincer grasp and place into putty and then retrieve in order to increase intrinsic hand strength and FMC, pt with no difficulty or coordination deficits throughout task however stating slight pain in R CMC joint       Second Session:  Pt in w/c upon arrival.  She reports lower back pain radiating down R leg 8-9/10. Rn informed, and during session, she gave pt pain meds. Pt tolerated 2 reps, 10-12 reps resting after each set of UE AROM exerc:  Overhead elbow flex/ext, shoulder flex/ext, elbow flex/ext, supination/pronation, finger flex/ext. Pt's R shoulder started to become sore, so OT instructed her to only use L UE for overhead elbow flex/ext exer and flex R shoulder to only 90 degrees with shoulder flex exerc. She tolerated 5 reps finger opposition and finger adduction/abduction. With use of bed rail, cues for toe touch wt bearing, pt transferred sit><stand with min/mod A. She stood with Daisy, use of bed rail for ~2 minutes before needing to sit with min/mod A, and supporting R LE.      Plan   Plan  Times per week: 90 minutes per day 5 days/week  Times per day: Daily  Current Treatment Recommendations: Strengthening, Patient/Caregiver Education & Training, Home Management Training, Equipment Evaluation, Education, & procurement, Balance Training, Functional Mobility Training, Endurance Training, Safety Education & Training, Self-Care / ADL, Wheelchair Mobility Training  G-Code     OutComes Score                                                  AM-PAC Score             Goals  Short term goals  Time Frame for Short term goals: STG=LTG  Long term goals  Time Frame for Long term goals : 2 weeks-all ongoing Long term goal 1: Pt will complete LB dressing with use of AE prn with spvn  Long term goal 2: Pt will complete UB dressing with mod I  Long term goal 3: Pt will complete bathing with spvn  Long term goal 4: Pt will complete toilet transfer with spvn maintaining weightbearing restrictions  Long term goal 5: Pt will complete toileting with spvn  Patient Goals   Patient goals :  To be more independent       Therapy Time   Individual Concurrent Group Co-treatment   Time In 0930         Time Out 1030         Minutes 60         Timed Code Treatment Minutes: 60 Minutes     Second Session Therapy Time:   Individual Concurrent Group Co-treatment   Time In 3871         Time Out 7311         Minutes 30           Timed Code Treatment Minutes:  30    Total Treatment Minutes:  [facility], [address] [facility], [address] [place] [address]

## 2020-12-30 NOTE — PROGRESS NOTES
Physical Therapy  Facility/Department: [facility] ACUTE REHAB UNIT  Daily Treatment Note  NAME: Kvng Silveira  : 1945  MRN: [id number]    Date of Service: 2020    Discharge Recommendations:  Home with assist PRN, Patient would benefit from continued therapy after discharge, Home with Home health PT   PT Equipment Recommendations  Other: Ongoing assessment at this time    Assessment   Body structures, Functions, Activity limitations: Decreased functional mobility ; Decreased ADL status; Decreased endurance;Decreased strength;Decreased balance; Increased pain;Decreased ROM  Assessment: Pain is the most limiting factor with pt's progress. The pain is on the R side and not on the side that was fractured. Pt appears restless and very anxious when pain is elicited. Pt moves slowly and very guarded for this reason. Pt is functioning below baseline level and will continue to benefit from skilled physical therapy services to address limitations and maximize potential towards ind and safer d/c home. Treatment Diagnosis: Decreased functional mobility 2/2 to distal femur fx  Prognosis: Good  Decision Making: Medium Complexity  PT Education: Transfer Training;Energy Conservation; Functional Mobility Training;General Safety;Weight-bearing Education; Adaptive Device Training;Precautions;Gait Training  Patient Education: Needs reinforcement of WB precautions  Barriers to Learning: None  REQUIRES PT FOLLOW UP: Yes  Activity Tolerance  Activity Tolerance: Patient limited by fatigue;Patient limited by pain; Patient limited by endurance     Patient Diagnosis(es): There were no encounter diagnoses. has a past medical history of Allergic, Anemia, Arthritis, Chronic kidney disease, Depression, Diabetes mellitus ([address] Disorders affecting multiple structures of eye, Epigastric pain, GERD (gastroesophageal reflux disease), Hyperlipidemia, Hypertension, IBS (irritable bowel syndrome), Intermittent atrial fibrillation ([address] Mass of lung, Migraine, Nausea & vomiting, Status post lung surgery, and Thyroid disease. has a past surgical history that includes brain surgery; joint replacement; Cholecystectomy; Bunionectomy; Hysterectomy; thoracotomy (3/10/11); Endoscopy, colon, diagnostic (8/22/2011); Appendectomy; colectomy; Cataract removal (2012); and knee surgery (Left, 10/11/2016). Restrictions  Restrictions/Precautions  Restrictions/Precautions: Weight Bearing  Required Braces or Orthoses?: Yes(L knee immobilizer)  Lower Extremity Weight Bearing Restrictions  Left Lower Extremity Weight Bearing: Foot Flat Weight Bearing(orders say both TTWB and flat foot WBing)  Position Activity Restriction  Other position/activity restrictions: L TTWB and foot flat WB listed, L knee immobilizer on at all times  Subjective   General  Chart Reviewed:  Yes Additional Pertinent Hx: 67 YO F who fell in her bathroom when her right leg gave out causing her to fall onto her left side. Resulted in immediate pain in left leg and inability to ambulate. Brought to [facility] ED by EMS. Imaging revealed left distal femur fracture. PMH significant for chronic back pain, bilateral total knee arthroplasties, & Diabetes. She has chronic right leg pain due to documented L4-5 lumbar stenosis. She is followed by Dr. Rosi Jasso for iron deficiency anemia. She has a history of heterozygous factor V Leiden discovered at time of PE in 2015, was fully anticoagulated for 1 yr, then placed on ASA, but had reaction. She was NOT on any anticoagulation prior to admission. Received a dose or 2 of heparin pre-op. ORIF of the left femur was completed 12/20/2020. Post-op complications include UTI, anemia, and shooting/burning pain involving her right calf. Patient believes this pain is associated with her neuropathic pain from her lumbar stenosis. MD ordered doppler since patient has a hx of DVT. She was due for B12 injection in office next week but hematology decided to administer B 12 on 12/23/20 prior to transfer to ARU. Family / Caregiver Present: No  Referring Practitioner: Dr. Rupal King: Pt reports that she is still having such severe pain but she has received pain meds. Pt states that it has been discussed with  the orthopedic doctor  to receive an epidural to control the pain. General Comment  Comments: Pt sitting in  wheelchair when PT arrived. Pt agreeable to therapy. Pain Screening  Patient Currently in Pain: Yes  Pain Assessment  Pain Level: 8  Pain Location: Leg;Back;Hip;Groin  Pain Orientation: Right  Pain Descriptors: Burning; Sharp  Pain Frequency: Intermittent  Pain Onset: On-going  Clinical Progression: Gradually worsening  Functional Pain Assessment: Prevents or interferes some active activities and ADLs  Vital Signs  Patient Currently in Pain: Yes Orientation  Orientation  Overall Orientation Status: Within Normal Limits  Cognition      Objective   Bed mobility  Rolling to Left: Supervision  Rolling to Right: (Declined to roll on R 2/2 to pain)  Supine to Sit: Contact guard assistance;Stand by assistance(VC for energy efficient technique)  Sit to Supine: Minimal assistance;Contact guard assistance(Barely claered the LLE onto the mat)  Scooting: Modified independent(Req additional time to complete)  Comment: Bed mobility performed on mat table and not in the bed. Practiced multiple times since pt struggles especially with the supine > sit transition  Transfers  Sit to Stand: Stand by assistance;Contact guard assistance(STS from w/c , mat table to a RW with TTWB on the LLE)  Stand to sit: Contact guard assistance;Stand by assistance(VC for hand placement)  Stand Pivot Transfers: Contact guard assistance(w/c<>mat  leading with both sides)  Comment: Pt has improved with ability to maintain WB precautions on LLE . Pt moves very slowly and with increased apprehesion 2/2 to fear of falling  Ambulation  Ambulation?: Yes(Pt unable to maintain precautions with the LLE when transferring,)  WB Status: LLE with TTWB/ Foot flat WB rstrictions per chart review  More Ambulation?: No  Ambulation 1  Surface: level tile  Device: Rolling Walker  Other Apparatus: Wheelchair follow;Knee Immobilizer(Note , the KI consistently slips down regardless of how tight the straps are pulled. There has been dycem added as well as increased material to address the ongoing problem. PT tried [facility] stockinette [facility] which appears to be working.  Req add.)  Assistance: Contact guard assistance  Quality of Gait: Step to gt pattern  Gait Deviations: Slow Concha;Decreased step length;Decreased step height  Distance: 15' distance limited by pain elicited  Stairs/Curb  Stairs?: No  Wheelchair Activities  Wheelchair Size: Switched pt to a 20\" cassia height w/c Wheelchair Cushion: Pressure Relieving  Level of Assistance for pressure relief activities: Modified independent  Wheelchair Parts Management: Yes  Left Leg Rest Level of Assistance: (Worked with taking LLE off of the legrest and flipping the shin guard up)  Left Brakes Level of Assistance: Modified independent(VC to locate and fully engage)  Right Brakes Level of Assistance: Modified independent(VC to locate and fully engage)  Propulsion: No  Propulsion 1  Propulsion: Manual  Level: Level Tile  Method: RUE;LUE;RLE(Very intermittently will use the R LE in stepping pattern)  Level of Assistance: Modified independent  Distance: 186' x1 160' x1( req additional time to navigate)    PT instructed pt with the following ex performed in supine to BLES:  1. AP/ QS/ GS  ( combination isometrics)  2. Hip abd with knee extended  3. SLRs    Pablo 10 reps of each with rests between          Second session: Pt sitting in w/c when PT arrived. Pt reports that she is afraid to work since her pain is \"bad\" right now reporting level 7. Pt then requested to use the restroom. Transf w/c <> commode req CGA with pt using the GB. Pt stood and used the GB for [employer] Pain increased oin the R with standing so pt requested PT assist with LB clothing management. Pt urinated and despite efforts was unable to have a BM. Req additional time. Pt was ind with pericare. Pt washed hands at sink with use of the w/c. Pt unable to reach soap dispenser but was otherwise Ind    Pt repositioned in w/c  With chair alarm intact. Both phone and call light left within reach.                   G-Code     OutComes Score                                                     AM-PAC Score             Goals  Short term goals  Time Frame for Short term goals: 10 -14 days  Short term goal 1: Ind with bed mobility - ongoing  Short term goal 2: MI with transf with  LRAD maintaining WB precautions - ongoing Short term goal 3: Pt able to propel w/c  on level surfaces x 300' at a time - ongoing  Short term goal 4: Pt amb with RW ~20' at a time while maintaining WB precautions as outlined by physician - ongoing  Short term goal 5: Pt to amb up and down 2 steps with AD with WB precautions as outlined by physician with CGA - ongoing  Long term goals  Time Frame for Long term goals : STG= LTG  Patient Goals   Patient goals : Get home ASAP.  Also \"not to fall again    Plan    Plan  Times per week: 5-7 x week x 90 minutes  Times per day: Twice a day  Current Treatment Recommendations: Strengthening, Transfer Training, Endurance Training, Patient/Caregiver Education & Training, Wheelchair Mobility Training, Pain Management, Balance Training, Gait Training, Functional Mobility Training, Stair training, Safety Education & Training, Positioning  Safety Devices  Type of devices: Gait belt, Chair alarm in place, Call light within reach, All fall risk precautions in place, Left in chair     Therapy Time   Individual Concurrent Group Co-treatment   Time In 1245         Time Out 1350         Minutes 65         Timed Code Treatment Minutes: Lori 61 Time:   Individual Concurrent Group Co-treatment   Time In [address]         Time Out 1510         Minutes 25           Timed Code Treatment Minutes:  65+25    Total Treatment Minutes:  [address] [facility], PT

## 2020-12-30 NOTE — PLAN OF CARE
Problem: Pain:  Goal: Control of acute pain  Description: Control of acute pain  12/30/2020 1450 by Kaylie Sanchez RN  Outcome: Ongoing  Note: Pt c/o pain rated 8-9/10, pt medicated with prn alternating tramadol and norco for pain control. Upon reassessment pt satisfied with pain control. MD updated on q3 hour breakthrough pain relief treatment. Will continue to monitor. Problem: Falls - Risk of:  Goal: Absence of physical injury  Description: Absence of physical injury  Outcome: Ongoing   Note: Pt has been free from fall during shift. Pt has called out appropriately for needs. Call light in reach. Bed in lowest position. Bed and chair alarm on while in use, will continue to monitor. Problem: Skin Integrity:  Goal: Will show no infection signs and symptoms  Description: Will show no infection signs and symptoms  12/30/2020 1450 by Kaylie Sanchez RN  Outcome: Ongoing   Note:  Pt skin assessed this shift, surgical incision shows no s/s of infection, no redness, drainage or odor. Pt is afebrile. Will continue to monitor.

## 2020-12-31 LAB
GLUCOSE BLD-MCNC: 144 MG/DL (ref 70–99)
GLUCOSE BLD-MCNC: 147 MG/DL (ref 70–99)
GLUCOSE BLD-MCNC: 161 MG/DL (ref 70–99)
GLUCOSE BLD-MCNC: 184 MG/DL (ref 70–99)
PERFORMED ON: ABNORMAL

## 2020-12-31 PROCEDURE — 97110 THERAPEUTIC EXERCISES: CPT

## 2020-12-31 PROCEDURE — 97535 SELF CARE MNGMENT TRAINING: CPT

## 2020-12-31 PROCEDURE — 97542 WHEELCHAIR MNGMENT TRAINING: CPT | Performed by: PHYSICAL THERAPIST

## 2020-12-31 PROCEDURE — 1280000000 HC REHAB R&B

## 2020-12-31 PROCEDURE — 6370000000 HC RX 637 (ALT 250 FOR IP): Performed by: STUDENT IN AN ORGANIZED HEALTH CARE EDUCATION/TRAINING PROGRAM

## 2020-12-31 PROCEDURE — 6370000000 HC RX 637 (ALT 250 FOR IP): Performed by: PHYSICIAN ASSISTANT

## 2020-12-31 PROCEDURE — 6370000000 HC RX 637 (ALT 250 FOR IP): Performed by: PHYSICAL MEDICINE & REHABILITATION

## 2020-12-31 PROCEDURE — 97116 GAIT TRAINING THERAPY: CPT | Performed by: PHYSICAL THERAPIST

## 2020-12-31 PROCEDURE — 97110 THERAPEUTIC EXERCISES: CPT | Performed by: PHYSICAL THERAPIST

## 2020-12-31 PROCEDURE — 97530 THERAPEUTIC ACTIVITIES: CPT

## 2020-12-31 PROCEDURE — 97530 THERAPEUTIC ACTIVITIES: CPT | Performed by: PHYSICAL THERAPIST

## 2020-12-31 RX ORDER — DEXTROSE MONOHYDRATE 25 G/50ML
12.5 INJECTION, SOLUTION INTRAVENOUS PRN
Status: DISCONTINUED | OUTPATIENT
Start: 2020-12-31 | End: 2021-01-08 | Stop reason: HOSPADM

## 2020-12-31 RX ORDER — PREDNISONE 10 MG/1
10 TABLET ORAL DAILY
Status: DISCONTINUED | OUTPATIENT
Start: 2021-01-06 | End: 2021-01-06

## 2020-12-31 RX ORDER — DEXTROSE MONOHYDRATE 50 MG/ML
100 INJECTION, SOLUTION INTRAVENOUS PRN
Status: DISCONTINUED | OUTPATIENT
Start: 2020-12-31 | End: 2021-01-08 | Stop reason: HOSPADM

## 2020-12-31 RX ORDER — PREDNISONE 20 MG/1
20 TABLET ORAL DAILY
Status: COMPLETED | OUTPATIENT
Start: 2020-12-31 | End: 2021-01-02

## 2020-12-31 RX ORDER — NICOTINE POLACRILEX 4 MG
15 LOZENGE BUCCAL PRN
Status: DISCONTINUED | OUTPATIENT
Start: 2020-12-31 | End: 2021-01-08 | Stop reason: HOSPADM

## 2020-12-31 RX ORDER — INSULIN LISPRO 100 [IU]/ML
0-12 INJECTION, SOLUTION INTRAVENOUS; SUBCUTANEOUS
Status: DISCONTINUED | OUTPATIENT
Start: 2020-12-31 | End: 2021-01-08 | Stop reason: HOSPADM

## 2020-12-31 RX ORDER — SENNA AND DOCUSATE SODIUM 50; 8.6 MG/1; MG/1
2 TABLET, FILM COATED ORAL 2 TIMES DAILY PRN
Status: DISCONTINUED | OUTPATIENT
Start: 2020-12-31 | End: 2021-01-08 | Stop reason: HOSPADM

## 2020-12-31 RX ORDER — INSULIN LISPRO 100 [IU]/ML
0-6 INJECTION, SOLUTION INTRAVENOUS; SUBCUTANEOUS NIGHTLY
Status: DISCONTINUED | OUTPATIENT
Start: 2020-12-31 | End: 2021-01-08 | Stop reason: HOSPADM

## 2020-12-31 RX ORDER — PREDNISONE 1 MG/1
5 TABLET ORAL DAILY
Status: DISCONTINUED | OUTPATIENT
Start: 2021-01-09 | End: 2021-01-06

## 2020-12-31 RX ADMIN — CLOPIDOGREL BISULFATE 75 MG: 75 TABLET ORAL at 07:55

## 2020-12-31 RX ADMIN — HYDROCORTISONE 2.5%: 25 CREAM TOPICAL at 07:56

## 2020-12-31 RX ADMIN — HYDROCHLOROTHIAZIDE 25 MG: 25 TABLET ORAL at 07:55

## 2020-12-31 RX ADMIN — PREDNISONE 20 MG: 20 TABLET ORAL at 12:04

## 2020-12-31 RX ADMIN — GABAPENTIN 300 MG: 300 CAPSULE ORAL at 21:15

## 2020-12-31 RX ADMIN — TIZANIDINE 2 MG: 4 TABLET ORAL at 16:24

## 2020-12-31 RX ADMIN — DOCUSATE SODIUM 50 MG AND SENNOSIDES 8.6 MG 2 TABLET: 8.6; 5 TABLET, FILM COATED ORAL at 08:17

## 2020-12-31 RX ADMIN — DICLOFENAC 2 G: 10 GEL TOPICAL at 07:54

## 2020-12-31 RX ADMIN — ALPRAZOLAM 0.5 MG: 0.5 TABLET ORAL at 08:17

## 2020-12-31 RX ADMIN — INSULIN LISPRO 2 UNITS: 100 INJECTION, SOLUTION INTRAVENOUS; SUBCUTANEOUS at 18:09

## 2020-12-31 RX ADMIN — APIXABAN 2.5 MG: 5 TABLET, FILM COATED ORAL at 07:55

## 2020-12-31 RX ADMIN — INSULIN LISPRO 2 UNITS: 100 INJECTION, SOLUTION INTRAVENOUS; SUBCUTANEOUS at 12:12

## 2020-12-31 RX ADMIN — HYDROCODONE BITARTRATE AND ACETAMINOPHEN 2 TABLET: 5; 325 TABLET ORAL at 13:51

## 2020-12-31 RX ADMIN — METOPROLOL SUCCINATE 50 MG: 50 TABLET, EXTENDED RELEASE ORAL at 09:38

## 2020-12-31 RX ADMIN — ALPRAZOLAM 0.5 MG: 0.5 TABLET ORAL at 23:58

## 2020-12-31 RX ADMIN — HYDROCODONE BITARTRATE AND ACETAMINOPHEN 2 TABLET: 5; 325 TABLET ORAL at 06:35

## 2020-12-31 RX ADMIN — METFORMIN HYDROCHLORIDE 500 MG: 500 TABLET ORAL at 07:55

## 2020-12-31 RX ADMIN — HYDROCODONE BITARTRATE AND ACETAMINOPHEN 2 TABLET: 5; 325 TABLET ORAL at 20:22

## 2020-12-31 RX ADMIN — LEVOTHYROXINE SODIUM 125 MCG: 0.03 TABLET ORAL at 06:35

## 2020-12-31 RX ADMIN — ACETAMINOPHEN 650 MG: 325 TABLET ORAL at 08:17

## 2020-12-31 RX ADMIN — TRAMADOL HYDROCHLORIDE 50 MG: 50 TABLET, COATED ORAL at 18:09

## 2020-12-31 RX ADMIN — SERTRALINE HYDROCHLORIDE 100 MG: 100 TABLET ORAL at 07:55

## 2020-12-31 RX ADMIN — PRAVASTATIN SODIUM 10 MG: 10 TABLET ORAL at 21:16

## 2020-12-31 RX ADMIN — GABAPENTIN 300 MG: 300 CAPSULE ORAL at 07:55

## 2020-12-31 RX ADMIN — TRAMADOL HYDROCHLORIDE 50 MG: 50 TABLET, COATED ORAL at 11:08

## 2020-12-31 RX ADMIN — DICLOFENAC 2 G: 10 GEL TOPICAL at 21:17

## 2020-12-31 RX ADMIN — APIXABAN 2.5 MG: 5 TABLET, FILM COATED ORAL at 21:15

## 2020-12-31 RX ADMIN — INSULIN LISPRO 1 UNITS: 100 INJECTION, SOLUTION INTRAVENOUS; SUBCUTANEOUS at 21:20

## 2020-12-31 RX ADMIN — GABAPENTIN 300 MG: 300 CAPSULE ORAL at 13:51

## 2020-12-31 RX ADMIN — METFORMIN HYDROCHLORIDE 1000 MG: 1000 TABLET ORAL at 21:15

## 2020-12-31 RX ADMIN — PANTOPRAZOLE SODIUM 40 MG: 40 TABLET, DELAYED RELEASE ORAL at 06:35

## 2020-12-31 ASSESSMENT — PAIN DESCRIPTION - PAIN TYPE
TYPE: ACUTE PAIN;NEUROPATHIC PAIN
TYPE: NEUROPATHIC PAIN

## 2020-12-31 ASSESSMENT — PAIN DESCRIPTION - DESCRIPTORS
DESCRIPTORS: BURNING
DESCRIPTORS: ACHING;BURNING;STABBING

## 2020-12-31 ASSESSMENT — PAIN SCALES - GENERAL
PAINLEVEL_OUTOF10: 5
PAINLEVEL_OUTOF10: 8
PAINLEVEL_OUTOF10: 6
PAINLEVEL_OUTOF10: 8

## 2020-12-31 ASSESSMENT — PAIN DESCRIPTION - LOCATION
LOCATION: BACK;LEG;HIP
LOCATION: BACK;HIP;LEG

## 2020-12-31 ASSESSMENT — PAIN DESCRIPTION - FREQUENCY: FREQUENCY: CONTINUOUS

## 2020-12-31 ASSESSMENT — PAIN DESCRIPTION - ONSET
ONSET: ON-GOING

## 2020-12-31 ASSESSMENT — PAIN - FUNCTIONAL ASSESSMENT
PAIN_FUNCTIONAL_ASSESSMENT: ACTIVITIES ARE NOT PREVENTED
PAIN_FUNCTIONAL_ASSESSMENT: PREVENTS OR INTERFERES SOME ACTIVE ACTIVITIES AND ADLS

## 2020-12-31 ASSESSMENT — PAIN DESCRIPTION - ORIENTATION: ORIENTATION: RIGHT

## 2020-12-31 NOTE — PROGRESS NOTES
Department of Physical Medicine & Rehabilitation  Progress Note    Patient Identification:  Elijah Childs  [id number]  : 1945  Admit date: 2020    Chief Complaint: L hip fracture     Subjective:   Reports pain in right leg was unbearable yesterday. Pain better controlled today with increase in Norco. Reports good appetite. BMs still loose without obvious active bleeding. Sleeping well. Reports some bilateral lower extremity edea L>R. Continues to tolerate therapy. ROS: No f/c, n/v, cp     Objective:  Patient Vitals for the past 24 hrs:   BP Temp Temp src Pulse Resp SpO2   20 0754 (!) 157/72 97 °F (36.1 °C) Oral 78 16 98 %   20 2100 137/76 98.1 °F (36.7 °C) Oral 73 16 94 %     Const: Alert. No distress, pleasant. HEENT: Normocephalic, atraumatic. Normal sclera/conjunctiva. MMM. CV: Regular rate and rhythm. Resp: No respiratory distress. Lungs CTAB. Abd: Soft, nontender, nondistended, NABS+   Ext: + edema bilaterally L>R  Neuro: Alert, oriented, appropriately interactive. Psych: Cooperative, appropriate mood and affect    Laboratory data: Available via EMR.    Last 24 hour lab  Recent Results (from the past 24 hour(s))   POCT Glucose    Collection Time: 20 11:40 AM   Result Value Ref Range    POC Glucose 154 (H) 70 - 99 mg/dl    Performed on ACCU-CHEK    POCT Glucose    Collection Time: 20  5:01 PM   Result Value Ref Range    POC Glucose 167 (H) 70 - 99 mg/dl    Performed on ACCU-CHEK    POCT Glucose    Collection Time: 20  7:36 AM   Result Value Ref Range    POC Glucose 147 (H) 70 - 99 mg/dl    Performed on ACCU-CHEK        Therapy progress:  PT  Position Activity Restriction  Other position/activity restrictions: L TTWB and foot flat WB listed, L knee immobilizer on at all times  Objective     Sit to Stand: Stand by assistance, Contact guard assistance(STS from w/c , mat table to a RW with TTWB on the LLE) Stand to sit: Contact guard assistance, Stand by assistance(VC for hand placement)  Device: Rolling Walker  Other Apparatus: Wheelchair follow, Knee Immobilizer(Note , the KI consistently slips down regardless of how tight the straps are pulled. There has been dycem added as well as increased material to address the ongoing problem. PT tried placing stockinette over the [address] which appears to be working. Req add.)  Assistance: Contact guard assistance  Distance: 15' distance limited by pain elicited  OT  PT Equipment Recommendations  Other: Ongoing assessment at this time  Toilet - Technique: Stand pivot  Equipment Used: Raised toilet seat with rails  Toilet Transfers Comments: use of GB; VCs to maintain WBing precautions  Assessment        SLP          Body mass index is 33.29 kg/m². Assessment and Plan:   Ms. Maria R Vergara will require ongoing medical management and daily physician oversight for the followin. Left femur fracture. - s/p ORIF 2020. Patient will proceed with treatment per orthopedic protocol.   - PT/OT required      2. Diabetes. - home  Metformin started. D/C insulin     3. Coagulopathy. (heterozygous factor V Leiden deficiency). - continue Eliquis 2.5 mg by mouth twice a day per hematology recommendations.     4. Coronary artery disease.   - continue Clopidogrel      5. UTI. Patient's preprocedure urinalysis is consistent with urinary tract infection, greater than 100,000 CFU Klebsiella pneumonia. - Continue Rocephin 1 g IV   - resolved    6. Normocytic anemia. Hemoglobin on admission was 11.8.   - Hgb on admission 11.8, Hgb 8.8 yesterday.  Continue to monitor.     7. Multi-level Degenerative Disc Disease  - Lumbar MRI on 20 with L4-5 circumferential disc bulge with a right synovial facet cyst in the posterior right epidural space resulting in severe stenosis of the right subarticular zone/lateral recess, with mass effect on the descending right nerve roots - Ortho consulted and recs appreciated  - will start Prednisone taper    Rehab Progress: Improving  Anticipated Dispo: home  Services/DME: TBD  ELOS: TBD    Will staff patient with Dr. Weston Sewell MD, PGY1    JA JohnsonP.H  PM&R  12/31/2020  9:26 AM

## 2020-12-31 NOTE — PLAN OF CARE
Problem: Pain:  Goal: Control of acute pain  Description: Control of acute pain  12/31/2020 0100 by Claudean Distel, RN  Outcome: Ongoing  Note: Pt voices pain needs appropriately, pain is assessed during shift. Problem: Falls - Risk of:  Goal: Will remain free from falls  Description: Will remain free from falls  Outcome: Ongoing  Note: Client remains free from falls, bed/chair alarm in place, door open, encouraged to use call light for needs, call light is within reach, bed locked in lowest position,  Will continue to monitor. Problem: Skin Integrity:  Goal: Will show no infection signs and symptoms  Description: Will show no infection signs and symptoms  12/31/2020 0100 by Claudean Distel, RN  Outcome: Ongoing  Note: Surgical site observed for signs/symptoms of infection. Vitals performed routinely, labs observed.  Will monitor pt while on ARU

## 2020-12-31 NOTE — PLAN OF CARE
Problem: Pain:  Description: Pain management should include both nonpharmacologic and pharmacologic interventions. Goal: Control of acute pain  Description: Control of acute pain  12/31/2020 0923 by Shakila Tabor RN  Outcome: Ongoing  Note: Patient continues to have 8/10 right back/leg pain. Medicated with norco and tylenol this morning. Tramadol also available. Voltaren gel applied to right leg. Problem: Falls - Risk of:  Goal: Will remain free from falls  Description: Will remain free from falls  12/31/2020 5058 by Shakila Tabor RN  Outcome: Ongoing  Note: Recent fall at home resulting in left leg fracture. No falls since admission. Patient A/Ox4. Fall precautions in place. Call light in reach. Calls out for assistance. Chair/bed alarm activated. Non skid footwear on. Problem: Skin Integrity:  Goal: Will show no infection signs and symptoms  Description: Will show no infection signs and symptoms  12/31/2020 0923 by Shakila Tabor RN  Outcome: Ongoing  Note: No signs of infection.

## 2020-12-31 NOTE — PROGRESS NOTES
Physical Therapy  Facility/Department: [facility] ACUTE REHAB UNIT  Daily Treatment Note  NAME: Apple Keene  : 1945  MRN: [id number]    Date of Service: 2020    Discharge Recommendations:  Home with assist PRN, Patient would benefit from continued therapy after discharge, Home with Home health PT   PT Equipment Recommendations  Other: Ongoing assessment at this time    Assessment   Body structures, Functions, Activity limitations: Decreased functional mobility ; Decreased ADL status; Decreased endurance;Decreased strength;Decreased balance; Increased pain;Decreased ROM  Assessment: Pain continues to be the most limiting factor with pt's progress. The pain is on the R side and not on the side that was fractured. Pt appears restless and very anxious when pain is elicited. Pt moves slowly and very guarded for this reason. Pt also c/o pain in B shlds and R wrist.  Pt is functioning below baseline level and will continue to benefit from skilled physical therapy services to address limitations and maximize potential towards ind and safer d/c home. Treatment Diagnosis: Decreased functional mobility 2/2 to distal femur fx  Prognosis: Good  Decision Making: Medium Complexity  PT Education: Transfer Training;Energy Conservation; Functional Mobility Training;General Safety;Weight-bearing Education; Adaptive Device Training;Precautions;Gait Training  Patient Education: Needs reinforcement of WB precautions  Barriers to Learning: None  REQUIRES PT FOLLOW UP: Yes  Activity Tolerance  Activity Tolerance: Patient limited by fatigue;Patient limited by pain; Patient limited by endurance     Patient Diagnosis(es): There were no encounter diagnoses. has a past medical history of Allergic, Anemia, Arthritis, Chronic kidney disease, Depression, Diabetes mellitus ([address] Disorders affecting multiple structures of eye, Epigastric pain, GERD (gastroesophageal reflux disease), Hyperlipidemia, Hypertension, IBS (irritable bowel syndrome), Intermittent atrial fibrillation ([address] Mass of lung, Migraine, Nausea & vomiting, Status post lung surgery, and Thyroid disease. has a past surgical history that includes brain surgery; joint replacement; Cholecystectomy; Bunionectomy; Hysterectomy; thoracotomy (3/10/11); Endoscopy, colon, diagnostic (8/22/2011); Appendectomy; colectomy; Cataract removal (2012); and knee surgery (Left, 10/11/2016). Restrictions  Restrictions/Precautions  Restrictions/Precautions: Weight Bearing  Required Braces or Orthoses?: Yes(L knee immobilizer)  Lower Extremity Weight Bearing Restrictions  Left Lower Extremity Weight Bearing: Foot Flat Weight Bearing(orders say both TTWB and flat foot WBing)  Position Activity Restriction  Other position/activity restrictions: L TTWB and foot flat WB listed, L knee immobilizer on at all times  Subjective   General  Chart Reviewed:  Yes Additional Pertinent Hx: 65 YO F who fell in her bathroom when her right leg gave out causing her to fall onto her left side. Resulted in immediate pain in left leg and inability to ambulate. Brought to [facility] ED by EMS. Imaging revealed left distal femur fracture. PMH significant for chronic back pain, bilateral total knee arthroplasties, & Diabetes. She has chronic right leg pain due to documented L4-5 lumbar stenosis. She is followed by Dr. Reynaldo Marks for iron deficiency anemia. She has a history of heterozygous factor V Leiden discovered at time of PE in 2015, was fully anticoagulated for 1 yr, then placed on ASA, but had reaction. She was NOT on any anticoagulation prior to admission. Received a dose or 2 of heparin pre-op. ORIF of the left femur was completed 12/20/2020. Post-op complications include UTI, anemia, and shooting/burning pain involving her right calf. Patient believes this pain is associated with her neuropathic pain from her lumbar stenosis. MD ordered doppler since patient has a hx of DVT. She was due for B12 injection in office next week but hematology decided to administer B 12 on 12/23/20 prior to transfer to ARU. Family / Caregiver Present: No  Referring Practitioner: Dr. Brody Rai  Subjective  Subjective: Pt reported that she is supposed to be getting steroids to address the pain. Pt reports that \"her pain is overwhelming at times. \"Pt also requested to use the restroom. General Comment  Comments: Pt sitting in  wheelchair when PT arrived. Pt agreeable to therapy.   Pain Screening  Patient Currently in Pain: Yes  Pain Assessment  Pain Level: 8  Pain Type: Neuropathic pain  Pain Location: Back;Leg;Hip  Pain Orientation: Right  Pain Descriptors: Aching;Burning;Stabbing  Pain Frequency: Continuous  Pain Onset: On-going  Clinical Progression: Gradually worsening  Functional Pain Assessment: Prevents or interferes some active activities and ADLs Non-Pharmaceutical Pain Intervention(s): Ambulation/Increased Activity; Emotional support;Repositioned  Vital Signs  Patient Currently in Pain: Yes       Orientation  Orientation  Overall Orientation Status: Within Normal Limits  Cognition      Objective    Bed Mobility : Sit> supine : Min A to clear the LLE onto the bed. Transfers  Sit to Stand: Stand by assistance;Contact guard assistance(STS from w/c  and commode to a RW with TTWB on the LLE)  Stand to sit: Contact guard assistance;Stand by assistance(VC for hand placement)  Comment: Pt cont to improve with ability to maintain WB precautions on LLE .  Pt moves very slowly and with increased apprehesion 2/2 to fear of falling  Ambulation  Ambulation?: Yes(Pt unable to maintain precautions with the LLE when transferring,)  WB Status: LLE with TTWB/ Foot flat WB rstrictions per chart review  More Ambulation?: No  Ambulation 1  Surface: level tile  Device: Rolling Walker  Other Apparatus: Wheelchair follow;Knee Immobilizer(R wrist splint)  Assistance: Contact guard assistance;Stand by assistance  Quality of Gait: Step to gt pattern  Gait Deviations: Slow Concha;Decreased step length;Decreased step height  Distance: 15' x2 BS chair<> commode  Stairs/Curb  Stairs?: No  Wheelchair Activities  Wheelchair Size: Switched pt to a 20\" cassai height w/c  Wheelchair Cushion: Pressure Relieving  Level of Assistance for pressure relief activities: Modified independent  Wheelchair Parts Management: Yes  Left Leg Rest Level of Assistance: (Worked with taking LLE off of the legrest and flipping the shin guard up)  Left Brakes Level of Assistance: Modified independent(VC to locate and fully engage)  Right Brakes Level of Assistance: Modified independent(VC to locate and fully engage)  Propulsion: Yes  Propulsion 1  Propulsion: Manual  Level: Level Tile  Method: RUE;LUE;RLE(Very intermittently will use the R LE in stepping pattern)  Level of Assistance: Modified independent Individual Concurrent Group Co-treatment   Time In 0830         Time Out 0940         Minutes 79              Second Session Therapy Time:   Individual Concurrent Group Co-treatment   Time In 9251         Time Out 1320         Minutes 35           Timed Code Treatment Minutes:  70+30    Total Treatment Minutes:  [address] , PT

## 2020-12-31 NOTE — PROGRESS NOTES
Occupational Therapy  Facility/Department: [facility] ACUTE REHAB UNIT  Daily Treatment Note  NAME: Tye Burk  : 1945  MRN: [id number]    Date of Service: 2020    Discharge Recommendations:  Continue to assess pending progress, 24 hour supervision or assist, Home with Home health OT  OT Equipment Recommendations  Equipment Needed: Yes  Mobility Devices: ADL Assistive Devices  ADL Assistive Devices: Shower Chair with back  Other: continue to assess pending progress    Assessment   Performance deficits / Impairments: Decreased functional mobility ; Decreased endurance;Decreased ADL status; Decreased balance;Decreased strength;Decreased high-level IADLs;Decreased fine motor control  Assessment: Pt w/improved tolerance for OT sessions this date. Pt completed LB bathing and dressing with CGA and verbal cues to maintain weightbearing precautions. Pt educated on use of sock aid to increase independence with footwear. Pt remains limited by pain and decreased endurance and benefits from continued skilled OT. Cont OT per POC  Treatment Diagnosis: decreased ADL status, functional transfers and functional mobility 2/2 L distal femur fx  Prognosis: Good  OT Education: Precautions; ADL Adaptive Strategies;OT Role;Transfer Training  Patient Education: pt verb understanding  REQUIRES OT FOLLOW UP: Yes  Activity Tolerance  Activity Tolerance: Patient Tolerated treatment well  Activity Tolerance: Pt req intermittent rest breaks throughout ADLs d/t fatigue  Safety Devices  Safety Devices in place: Yes  Type of devices: Left in chair;Call light within reach; Chair alarm in place; All fall risk precautions in place;Nurse notified         Patient Diagnosis(es): There were no encounter diagnoses. Feeding: Independent(Lunch tray provided at end of session)  Grooming: Independent(Pt applied lotion seated in w/c)  UE Bathing: Supervision(Pt washed/dried all components of UE bathing seated on TTB)  LE Bathing: Contact guard assistance(Pt washed/dried RLE and front jameson area seated on TTB; LLE covered with bag and propped up on trashcan; Pt also propped R foot on trashcan to wash/dry; Pt washed/dried bottom in stance at GB)  UE Dressing: Setup(Pt donned bra and long sleeve shirt with setup)  LE Dressing: Minimal assistance(Pt doffed pants, underwear, and socks w/+ time; Pt threaded underwear and pants w/+ time and pulled up in stance at sink; Pt donned R sock w/foot propped up on stool & L sock using sock aid; Pt req assist to doff/don R compression sock)  Toileting: Contact guard assistance;Setup(Pt completed pants mgmt down in stance at GB w/CGA; Pt voided and completed pericare seated w/setup of wipes)  Additional Comments: Pt required intermittent VCs to maintain LLE TTWB precaution while in stance for LB bathing and dressing. Balance  Sitting Balance: Independent(seated on TTB)  Standing Balance: Contact guard assistance  Standing Balance  Time: ~2 mins total  Activity: functional transfers, LB bathing/dressing, toileting  Functional Mobility  Functional - Mobility Device: Wheelchair  Activity: To/from bathroom  Assist Level: Supervision  Functional Mobility Comments: Therapist provided assistance to maneuver w/c in bathroom environment  Toilet Transfers  Toilet - Technique: Stand pivot  Equipment Used: Raised toilet seat with rails  Toilet Transfer: Contact guard assistance  Toilet Transfers Comments: use of GB; VCs to maintain WBing precautions  Shower Transfers  Shower - Transfer From: Wheelchair  Shower - Transfer Type: To and From  Shower - Transfer To:  Transfer tub bench  Shower - Technique: Stand pivot  Shower Transfers: Contact Guard Shower Transfers Comments: use of GB; VCs to maintain WBing precautions  Bed mobility  Supine to Sit: Stand by assistance(HOB slightly elevated)  Transfers  Sit Pivot Transfers: Contact guard assistance(From EOB to w/c)  Sit to stand: Stand by assistance(From TTB and w/c)  Stand to sit: Stand by assistance                       Cognition  Overall Cognitive Status: WFL                                         Second Session: Pt seated in w/c upon arrival, pleasant and agreeable to therapy. Pt reporting 9/10 pain in RLE and back - RN present to administer pain meds. Pt self-propelled w/c to therapy gym w/BUEs and RLE w/several short rest breaks d/t fatigue. Pt completed brief sit >< stand transfer from w/c to tabletop with CGA. Therapist placed slide board under left side of w/c cushion for increased LLE support while up on leg rest. Pt completed the following UE therex to maximize strength and endurance for functional tasks. Pt completed 2 sets x 10 reps of each with 1# free weights: shoulder flexion (only to ~80 degrees with RUE), elbow flexion, forward punches, and internal/external rotation (LUE only). Pt w/good tolerance for therex and stated she has free weights at home she would like to continue using to maintain strength. Pt provided with HEP to increase retention and carryover of UE exercises. Therapist provided assistance for w/c mobility back to room for sake of time. Pt completed toilet transfer to RTS and pants mgmt down in stance at [facility] w/CGA. Pt left seated on toilet w/RN present.        Plan   Plan  Times per week: 90 minutes per day 5 days/week  Times per day: Daily  Current Treatment Recommendations: Strengthening, Patient/Caregiver Education & Training, Home Management Training, Equipment Evaluation, Education, & procurement, Balance Training, Functional Mobility Training, Endurance Training, Safety Education & Training, Self-Care / ADL, Wheelchair Mobility Training  G-Code     OutComes Score AM-PAC Score             Goals  Short term goals  Time Frame for Short term goals: STG=LTG  Long term goals  Time Frame for Long term goals : 2 weeks-all ongoing  Long term goal 1: Pt will complete LB dressing with use of AE prn with spvn  Long term goal 2: Pt will complete UB dressing with mod I  Long term goal 3: Pt will complete bathing with spvn  Long term goal 4: Pt will complete toilet transfer with spvn maintaining weightbearing restrictions  Long term goal 5: Pt will complete toileting with spvn  Patient Goals   Patient goals :  To be more independent       Therapy Time   Individual Second Group Co-treatment   Time In 1100  1345       Time Out 1205  1415       Minutes 65  30       Timed Code Treatment Minutes: 72 Minutes + 30 = 95 Minutes Total       [place], [place]

## 2020-12-31 NOTE — CARE COORDINATION
Referred to patient per RN. Family requesting update. Call placed to patient's daughter Gabriel Beltrán. Discussed patient's progress. All questions answered. Facilitated MD speaking to daughter about patient's pain issues. No other needs at this time.  Electronically signed by BERYL Hudson LISW-S on 12/31/2020 at 1:35 PM s

## 2021-01-01 LAB
ANION GAP SERPL CALCULATED.3IONS-SCNC: 10 MMOL/L (ref 3–16)
BASOPHILS ABSOLUTE: 0.1 K/UL (ref 0–0.2)
BASOPHILS RELATIVE PERCENT: 1 %
BUN BLDV-MCNC: 14 MG/DL (ref 7–20)
CALCIUM SERPL-MCNC: 9.1 MG/DL (ref 8.3–10.6)
CHLORIDE BLD-SCNC: 98 MMOL/L (ref 99–110)
CO2: 25 MMOL/L (ref 21–32)
CREAT SERPL-MCNC: 0.9 MG/DL (ref 0.6–1.2)
EOSINOPHILS ABSOLUTE: 0.4 K/UL (ref 0–0.6)
EOSINOPHILS RELATIVE PERCENT: 5.6 %
GFR AFRICAN AMERICAN: >60
GFR NON-AFRICAN AMERICAN: >60
GLUCOSE BLD-MCNC: 126 MG/DL (ref 70–99)
GLUCOSE BLD-MCNC: 133 MG/DL (ref 70–99)
GLUCOSE BLD-MCNC: 171 MG/DL (ref 70–99)
GLUCOSE BLD-MCNC: 182 MG/DL (ref 70–99)
GLUCOSE BLD-MCNC: 187 MG/DL (ref 70–99)
HCT VFR BLD CALC: 25.1 % (ref 36–48)
HEMOGLOBIN: 8.2 G/DL (ref 12–16)
LYMPHOCYTES ABSOLUTE: 1.6 K/UL (ref 1–5.1)
LYMPHOCYTES RELATIVE PERCENT: 22.4 %
MCH RBC QN AUTO: 30.1 PG (ref 26–34)
MCHC RBC AUTO-ENTMCNC: 32.9 G/DL (ref 31–36)
MCV RBC AUTO: 91.7 FL (ref 80–100)
MONOCYTES ABSOLUTE: 1.1 K/UL (ref 0–1.3)
MONOCYTES RELATIVE PERCENT: 16.1 %
NEUTROPHILS ABSOLUTE: 3.9 K/UL (ref 1.7–7.7)
NEUTROPHILS RELATIVE PERCENT: 54.9 %
PDW BLD-RTO: 17.1 % (ref 12.4–15.4)
PERFORMED ON: ABNORMAL
PLATELET # BLD: 417 K/UL (ref 135–450)
PMV BLD AUTO: 8.7 FL (ref 5–10.5)
POTASSIUM REFLEX MAGNESIUM: 3.7 MMOL/L (ref 3.5–5.1)
RBC # BLD: 2.73 M/UL (ref 4–5.2)
SODIUM BLD-SCNC: 133 MMOL/L (ref 136–145)
WBC # BLD: 7.1 K/UL (ref 4–11)

## 2021-01-01 PROCEDURE — 6370000000 HC RX 637 (ALT 250 FOR IP): Performed by: PHYSICIAN ASSISTANT

## 2021-01-01 PROCEDURE — 6370000000 HC RX 637 (ALT 250 FOR IP): Performed by: PHYSICAL MEDICINE & REHABILITATION

## 2021-01-01 PROCEDURE — 6370000000 HC RX 637 (ALT 250 FOR IP): Performed by: STUDENT IN AN ORGANIZED HEALTH CARE EDUCATION/TRAINING PROGRAM

## 2021-01-01 PROCEDURE — 80048 BASIC METABOLIC PNL TOTAL CA: CPT

## 2021-01-01 PROCEDURE — 85025 COMPLETE CBC W/AUTO DIFF WBC: CPT

## 2021-01-01 PROCEDURE — 36415 COLL VENOUS BLD VENIPUNCTURE: CPT

## 2021-01-01 PROCEDURE — 1280000000 HC REHAB R&B

## 2021-01-01 RX ORDER — CALCIUM CARBONATE 200(500)MG
500 TABLET,CHEWABLE ORAL 3 TIMES DAILY PRN
Status: DISCONTINUED | OUTPATIENT
Start: 2021-01-01 | End: 2021-01-08 | Stop reason: HOSPADM

## 2021-01-01 RX ORDER — ONDANSETRON 4 MG/1
4 TABLET, ORALLY DISINTEGRATING ORAL EVERY 8 HOURS PRN
Status: DISCONTINUED | OUTPATIENT
Start: 2021-01-01 | End: 2021-01-08 | Stop reason: HOSPADM

## 2021-01-01 RX ADMIN — PREDNISONE 20 MG: 20 TABLET ORAL at 08:01

## 2021-01-01 RX ADMIN — CHOLESTYRAMINE 4 G: 4 POWDER, FOR SUSPENSION ORAL at 08:00

## 2021-01-01 RX ADMIN — INSULIN LISPRO 1 UNITS: 100 INJECTION, SOLUTION INTRAVENOUS; SUBCUTANEOUS at 21:40

## 2021-01-01 RX ADMIN — GABAPENTIN 300 MG: 300 CAPSULE ORAL at 13:53

## 2021-01-01 RX ADMIN — ALPRAZOLAM 0.5 MG: 0.5 TABLET ORAL at 21:22

## 2021-01-01 RX ADMIN — METFORMIN HYDROCHLORIDE 1000 MG: 1000 TABLET ORAL at 21:22

## 2021-01-01 RX ADMIN — PRAVASTATIN SODIUM 10 MG: 10 TABLET ORAL at 21:21

## 2021-01-01 RX ADMIN — HYDROCODONE BITARTRATE AND ACETAMINOPHEN 2 TABLET: 5; 325 TABLET ORAL at 23:22

## 2021-01-01 RX ADMIN — POTASSIUM BICARBONATE 40 MEQ: 782 TABLET, EFFERVESCENT ORAL at 08:00

## 2021-01-01 RX ADMIN — CLOPIDOGREL BISULFATE 75 MG: 75 TABLET ORAL at 08:01

## 2021-01-01 RX ADMIN — DIPHENOXYLATE HYDROCHLORIDE AND ATROPINE SULFATE 1 TABLET: 2.5; .025 TABLET ORAL at 08:00

## 2021-01-01 RX ADMIN — HYDROCODONE BITARTRATE AND ACETAMINOPHEN 2 TABLET: 5; 325 TABLET ORAL at 17:18

## 2021-01-01 RX ADMIN — INSULIN LISPRO 2 UNITS: 100 INJECTION, SOLUTION INTRAVENOUS; SUBCUTANEOUS at 11:56

## 2021-01-01 RX ADMIN — DICLOFENAC 2 G: 10 GEL TOPICAL at 08:04

## 2021-01-01 RX ADMIN — ONDANSETRON 4 MG: 4 TABLET, ORALLY DISINTEGRATING ORAL at 11:02

## 2021-01-01 RX ADMIN — TRAMADOL HYDROCHLORIDE 50 MG: 50 TABLET, COATED ORAL at 21:22

## 2021-01-01 RX ADMIN — SERTRALINE HYDROCHLORIDE 100 MG: 100 TABLET ORAL at 08:01

## 2021-01-01 RX ADMIN — GABAPENTIN 300 MG: 300 CAPSULE ORAL at 21:21

## 2021-01-01 RX ADMIN — TRAMADOL HYDROCHLORIDE 50 MG: 50 TABLET, COATED ORAL at 05:06

## 2021-01-01 RX ADMIN — GABAPENTIN 300 MG: 300 CAPSULE ORAL at 08:01

## 2021-01-01 RX ADMIN — HYDROCODONE BITARTRATE AND ACETAMINOPHEN 2 TABLET: 5; 325 TABLET ORAL at 08:55

## 2021-01-01 RX ADMIN — ONDANSETRON 4 MG: 4 TABLET, ORALLY DISINTEGRATING ORAL at 21:22

## 2021-01-01 RX ADMIN — LEVOTHYROXINE SODIUM 125 MCG: 0.03 TABLET ORAL at 06:47

## 2021-01-01 RX ADMIN — PANTOPRAZOLE SODIUM 40 MG: 40 TABLET, DELAYED RELEASE ORAL at 06:48

## 2021-01-01 RX ADMIN — METFORMIN HYDROCHLORIDE 500 MG: 500 TABLET ORAL at 08:01

## 2021-01-01 RX ADMIN — APIXABAN 2.5 MG: 5 TABLET, FILM COATED ORAL at 08:01

## 2021-01-01 RX ADMIN — ALPRAZOLAM 0.5 MG: 0.5 TABLET ORAL at 10:35

## 2021-01-01 RX ADMIN — TIZANIDINE 2 MG: 4 TABLET ORAL at 00:00

## 2021-01-01 RX ADMIN — INSULIN LISPRO 2 UNITS: 100 INJECTION, SOLUTION INTRAVENOUS; SUBCUTANEOUS at 17:13

## 2021-01-01 RX ADMIN — HYDROCHLOROTHIAZIDE 25 MG: 25 TABLET ORAL at 08:00

## 2021-01-01 RX ADMIN — DICLOFENAC 2 G: 10 GEL TOPICAL at 21:22

## 2021-01-01 RX ADMIN — APIXABAN 2.5 MG: 5 TABLET, FILM COATED ORAL at 21:22

## 2021-01-01 RX ADMIN — METOPROLOL SUCCINATE 50 MG: 50 TABLET, EXTENDED RELEASE ORAL at 08:01

## 2021-01-01 ASSESSMENT — PAIN DESCRIPTION - LOCATION
LOCATION: BACK;HIP;LEG

## 2021-01-01 ASSESSMENT — PAIN SCALES - GENERAL
PAINLEVEL_OUTOF10: 6
PAINLEVEL_OUTOF10: 9
PAINLEVEL_OUTOF10: 8
PAINLEVEL_OUTOF10: 6
PAINLEVEL_OUTOF10: 10
PAINLEVEL_OUTOF10: 10

## 2021-01-01 ASSESSMENT — PAIN DESCRIPTION - ORIENTATION
ORIENTATION: RIGHT
ORIENTATION: RIGHT

## 2021-01-01 ASSESSMENT — PAIN - FUNCTIONAL ASSESSMENT: PAIN_FUNCTIONAL_ASSESSMENT: ACTIVITIES ARE NOT PREVENTED

## 2021-01-01 ASSESSMENT — PAIN DESCRIPTION - PAIN TYPE
TYPE: ACUTE PAIN
TYPE: ACUTE PAIN

## 2021-01-01 ASSESSMENT — PAIN DESCRIPTION - ONSET: ONSET: ON-GOING

## 2021-01-01 ASSESSMENT — PAIN DESCRIPTION - PROGRESSION
CLINICAL_PROGRESSION: NOT CHANGED

## 2021-01-01 ASSESSMENT — PAIN DESCRIPTION - FREQUENCY: FREQUENCY: CONTINUOUS

## 2021-01-01 NOTE — PROGRESS NOTES
Assessment completed, medications taken without difficulty. Patient c/o pain. Non pharmacologic interventions, patient declines norco @ this time. Fall precautions are in place, call light and bedside table are within reach. Patient is able to teach back to call for assistance to transfer.

## 2021-01-01 NOTE — PLAN OF CARE
Problem: Pain:  Goal: Pain level will decrease  Description: Pain level will decrease  Outcome: Ongoing  Note: Patient has complained of left leg pain and chronic back and leg pain. She has been medicated on request as charted. Legs elevated off bed. Patient declined ice to affected areas. Problem: Falls - Risk of:  Goal: Will remain free from falls  Description: Will remain free from falls  Outcome: Ongoing  Note: Patient has called appropriately for assistance to bathroom. Fall precautions in place. Bed is in low and locked position. Bed alarm set. Call light in reach. Frequent visual checks made. She has remained free from falls. Problem: Skin Integrity:  Goal: Absence of new skin breakdown  Description: Absence of new skin breakdown  Outcome: Ongoing  Note: Patient is continent of bowel and bladder. Scattered bruising. Left leg dressing clean dry and intact. No drainage or odor noted. Specialty bed in use. No new skin issues noted.

## 2021-01-01 NOTE — PROGRESS NOTES
Department of Physical Medicine & Rehabilitation  Progress Note    Patient Identification:  Kvng Silveira  [id number]  : 1945  Admit date: 2020    Chief Complaint: L hip fracture     Subjective:   Slightly improved pain in the right leg with steroids today. She did not sleep very well last night and is not eating very much today. Otherwise participating in therapy. ROS: No f/c, n/v, cp     Objective:  Patient Vitals for the past 24 hrs:   BP Temp Temp src Pulse Resp SpO2   21 0730 (!) 158/74 98 °F (36.7 °C) Oral 76 18 96 %   20 (!) 148/75 97.6 °F (36.4 °C) Oral 76 18 95 %     Const: Alert. No distress, pleasant. HEENT: Normocephalic, atraumatic. Normal sclera/conjunctiva. MMM. CV: Regular rate and rhythm. Resp: No respiratory distress. Lungs CTAB. Abd: Soft, nontender, nondistended, NABS+   Ext: + edema bilaterally L>R  Neuro: Alert, oriented, appropriately interactive. Psych: Cooperative, appropriate mood and affect    Laboratory data: Available via EMR.    Last 24 hour lab  Recent Results (from the past 24 hour(s))   POCT Glucose    Collection Time: 20 12:07 PM   Result Value Ref Range    POC Glucose 144 (H) 70 - 99 mg/dl    Performed on ACCU-CHEK    POCT Glucose    Collection Time: 20  4:47 PM   Result Value Ref Range    POC Glucose 184 (H) 70 - 99 mg/dl    Performed on ACCU-CHEK    POCT Glucose    Collection Time: 20  8:06 PM   Result Value Ref Range    POC Glucose 161 (H) 70 - 99 mg/dl    Performed on ACCU-CHEK    Basic Metabolic Panel w/ Reflex to MG    Collection Time: 21  6:13 AM   Result Value Ref Range    Sodium 133 (L) 136 - 145 mmol/L    Potassium reflex Magnesium 3.7 3.5 - 5.1 mmol/L    Chloride 98 (L) 99 - 110 mmol/L    CO2 25 21 - 32 mmol/L    Anion Gap 10 3 - 16    Glucose 126 (H) 70 - 99 mg/dL    BUN 14 7 - 20 mg/dL    CREATININE 0.9 0.6 - 1.2 mg/dL    GFR Non-African American >60 >60    GFR  >60 >60 - s/p ORIF 12/21/2020. Patient will proceed with treatment per orthopedic protocol.   - PT/OT required      2. Diabetes. - home  Metformin started. D/C insulin     3. Coagulopathy. (heterozygous factor V Leiden deficiency). - continue Eliquis 2.5 mg by mouth twice a day per hematology recommendations.     4. Coronary artery disease.   - continue Clopidogrel      5. UTI. Patient's preprocedure urinalysis is consistent with urinary tract infection, greater than 100,000 CFU Klebsiella pneumonia. - Continue Rocephin 1 g IV   - resolved    6. Normocytic anemia. Hemoglobin on admission was 11.8.   - Hgb on admission 11.8, Hgb 8.8 yesterday.  Continue to monitor.     7. Multi-level Degenerative Disc Disease  - Lumbar MRI on 12/17/20 with L4-5 circumferential disc bulge with a right synovial facet cyst in the posterior right epidural space resulting in severe stenosis of the right subarticular zone/lateral recess, with mass effect on the descending right nerve roots  - Ortho consulted and recs appreciated  - will start Prednisone taper    Rehab Progress: Improving  Anticipated Dispo: home  Services/DME: TBD  ELOS: TBD    Will staff patient with Dr. Kirit Thompson MD, PGY1    Ashley Lopez, JELANI.O. M.P.H  PM&R  1/1/2021  11:52 AM

## 2021-01-01 NOTE — PLAN OF CARE
Problem: Pain:  Goal: Pain level will decrease  1/1/2021 0826 by Sadaf Wilburn RN  Outcome: Ongoing     Problem: Pain:  Goal: Control of acute pain  Outcome: Ongoing     Problem: Pain:  Goal: Control of chronic pain  Outcome: Ongoing   Pain continues patient is able to rate pain on a 0-10 scale. Pain managed with pharmacologic and non pharmacologic pain management.

## 2021-01-02 LAB
GLUCOSE BLD-MCNC: 151 MG/DL (ref 70–99)
GLUCOSE BLD-MCNC: 153 MG/DL (ref 70–99)
GLUCOSE BLD-MCNC: 188 MG/DL (ref 70–99)
GLUCOSE BLD-MCNC: 196 MG/DL (ref 70–99)
GLUCOSE BLD-MCNC: 227 MG/DL (ref 70–99)
PERFORMED ON: ABNORMAL

## 2021-01-02 PROCEDURE — 6370000000 HC RX 637 (ALT 250 FOR IP): Performed by: PHYSICIAN ASSISTANT

## 2021-01-02 PROCEDURE — 97530 THERAPEUTIC ACTIVITIES: CPT

## 2021-01-02 PROCEDURE — 6370000000 HC RX 637 (ALT 250 FOR IP): Performed by: PHYSICAL MEDICINE & REHABILITATION

## 2021-01-02 PROCEDURE — 1280000000 HC REHAB R&B

## 2021-01-02 PROCEDURE — 97116 GAIT TRAINING THERAPY: CPT | Performed by: PHYSICAL THERAPIST

## 2021-01-02 PROCEDURE — 97530 THERAPEUTIC ACTIVITIES: CPT | Performed by: PHYSICAL THERAPIST

## 2021-01-02 PROCEDURE — 6360000002 HC RX W HCPCS: Performed by: PHYSICAL MEDICINE & REHABILITATION

## 2021-01-02 PROCEDURE — 97535 SELF CARE MNGMENT TRAINING: CPT

## 2021-01-02 PROCEDURE — 6370000000 HC RX 637 (ALT 250 FOR IP): Performed by: STUDENT IN AN ORGANIZED HEALTH CARE EDUCATION/TRAINING PROGRAM

## 2021-01-02 PROCEDURE — 97110 THERAPEUTIC EXERCISES: CPT | Performed by: PHYSICAL THERAPIST

## 2021-01-02 RX ORDER — GABAPENTIN 400 MG/1
400 CAPSULE ORAL 3 TIMES DAILY
Status: DISCONTINUED | OUTPATIENT
Start: 2021-01-02 | End: 2021-01-08 | Stop reason: HOSPADM

## 2021-01-02 RX ORDER — HYDROCODONE BITARTRATE AND ACETAMINOPHEN 5; 325 MG/1; MG/1
2 TABLET ORAL ONCE
Status: COMPLETED | OUTPATIENT
Start: 2021-01-02 | End: 2021-01-02

## 2021-01-02 RX ORDER — KETOROLAC TROMETHAMINE 30 MG/ML
15 INJECTION, SOLUTION INTRAMUSCULAR; INTRAVENOUS ONCE
Status: COMPLETED | OUTPATIENT
Start: 2021-01-02 | End: 2021-01-02

## 2021-01-02 RX ORDER — TIZANIDINE 4 MG/1
4 TABLET ORAL EVERY 8 HOURS PRN
Status: DISCONTINUED | OUTPATIENT
Start: 2021-01-02 | End: 2021-01-08 | Stop reason: HOSPADM

## 2021-01-02 RX ORDER — POTASSIUM CHLORIDE 20 MEQ/1
20 TABLET, EXTENDED RELEASE ORAL 2 TIMES DAILY WITH MEALS
Status: DISCONTINUED | OUTPATIENT
Start: 2021-01-02 | End: 2021-01-03

## 2021-01-02 RX ADMIN — DIPHENOXYLATE HYDROCHLORIDE AND ATROPINE SULFATE 1 TABLET: 2.5; .025 TABLET ORAL at 22:14

## 2021-01-02 RX ADMIN — INSULIN LISPRO 1 UNITS: 100 INJECTION, SOLUTION INTRAVENOUS; SUBCUTANEOUS at 22:15

## 2021-01-02 RX ADMIN — APIXABAN 2.5 MG: 5 TABLET, FILM COATED ORAL at 22:14

## 2021-01-02 RX ADMIN — POTASSIUM CHLORIDE 20 MEQ: 1500 TABLET, EXTENDED RELEASE ORAL at 17:39

## 2021-01-02 RX ADMIN — KETOROLAC TROMETHAMINE 15 MG: 30 INJECTION, SOLUTION INTRAMUSCULAR at 15:42

## 2021-01-02 RX ADMIN — HYDROCODONE BITARTRATE AND ACETAMINOPHEN 2 TABLET: 5; 325 TABLET ORAL at 17:39

## 2021-01-02 RX ADMIN — DICLOFENAC 2 G: 10 GEL TOPICAL at 22:13

## 2021-01-02 RX ADMIN — GABAPENTIN 300 MG: 300 CAPSULE ORAL at 09:34

## 2021-01-02 RX ADMIN — GABAPENTIN 400 MG: 400 CAPSULE ORAL at 22:13

## 2021-01-02 RX ADMIN — GABAPENTIN 400 MG: 400 CAPSULE ORAL at 12:45

## 2021-01-02 RX ADMIN — HYDROCODONE BITARTRATE AND ACETAMINOPHEN 2 TABLET: 5; 325 TABLET ORAL at 06:59

## 2021-01-02 RX ADMIN — INSULIN LISPRO 4 UNITS: 100 INJECTION, SOLUTION INTRAVENOUS; SUBCUTANEOUS at 17:40

## 2021-01-02 RX ADMIN — HYDROCHLOROTHIAZIDE 25 MG: 25 TABLET ORAL at 09:34

## 2021-01-02 RX ADMIN — METFORMIN HYDROCHLORIDE 1000 MG: 1000 TABLET ORAL at 22:14

## 2021-01-02 RX ADMIN — POTASSIUM CHLORIDE 20 MEQ: 1500 TABLET, EXTENDED RELEASE ORAL at 13:49

## 2021-01-02 RX ADMIN — LEVOTHYROXINE SODIUM 125 MCG: 0.03 TABLET ORAL at 06:52

## 2021-01-02 RX ADMIN — APIXABAN 2.5 MG: 5 TABLET, FILM COATED ORAL at 09:34

## 2021-01-02 RX ADMIN — PANTOPRAZOLE SODIUM 40 MG: 40 TABLET, DELAYED RELEASE ORAL at 06:52

## 2021-01-02 RX ADMIN — METFORMIN HYDROCHLORIDE 500 MG: 500 TABLET ORAL at 09:34

## 2021-01-02 RX ADMIN — INSULIN LISPRO 2 UNITS: 100 INJECTION, SOLUTION INTRAVENOUS; SUBCUTANEOUS at 12:01

## 2021-01-02 RX ADMIN — PREDNISONE 20 MG: 20 TABLET ORAL at 09:34

## 2021-01-02 RX ADMIN — INSULIN LISPRO 2 UNITS: 100 INJECTION, SOLUTION INTRAVENOUS; SUBCUTANEOUS at 09:38

## 2021-01-02 RX ADMIN — HYDROCODONE BITARTRATE AND ACETAMINOPHEN 2 TABLET: 5; 325 TABLET ORAL at 12:44

## 2021-01-02 RX ADMIN — TIZANIDINE 4 MG: 4 TABLET ORAL at 22:14

## 2021-01-02 RX ADMIN — SERTRALINE HYDROCHLORIDE 100 MG: 100 TABLET ORAL at 09:34

## 2021-01-02 RX ADMIN — TIZANIDINE 4 MG: 4 TABLET ORAL at 13:45

## 2021-01-02 RX ADMIN — CLOPIDOGREL BISULFATE 75 MG: 75 TABLET ORAL at 09:35

## 2021-01-02 RX ADMIN — DIPHENOXYLATE HYDROCHLORIDE AND ATROPINE SULFATE 1 TABLET: 2.5; .025 TABLET ORAL at 09:34

## 2021-01-02 RX ADMIN — DICLOFENAC 2 G: 10 GEL TOPICAL at 09:36

## 2021-01-02 RX ADMIN — METOPROLOL SUCCINATE 50 MG: 50 TABLET, EXTENDED RELEASE ORAL at 09:34

## 2021-01-02 RX ADMIN — ALPRAZOLAM 0.5 MG: 0.5 TABLET ORAL at 22:13

## 2021-01-02 RX ADMIN — ALPRAZOLAM 0.5 MG: 0.5 TABLET ORAL at 09:42

## 2021-01-02 ASSESSMENT — PAIN SCALES - GENERAL
PAINLEVEL_OUTOF10: 10
PAINLEVEL_OUTOF10: 8
PAINLEVEL_OUTOF10: 10

## 2021-01-02 ASSESSMENT — PAIN DESCRIPTION - ONSET
ONSET: ON-GOING
ONSET: ON-GOING

## 2021-01-02 ASSESSMENT — PAIN DESCRIPTION - LOCATION
LOCATION: BACK;HIP;LEG
LOCATION: BACK;HIP
LOCATION: BACK;HIP;LEG;GROIN

## 2021-01-02 ASSESSMENT — PAIN DESCRIPTION - PROGRESSION
CLINICAL_PROGRESSION: NOT CHANGED

## 2021-01-02 ASSESSMENT — PAIN DESCRIPTION - PAIN TYPE
TYPE: ACUTE PAIN
TYPE: ACUTE PAIN

## 2021-01-02 ASSESSMENT — PAIN DESCRIPTION - ORIENTATION
ORIENTATION: RIGHT;LEFT
ORIENTATION: RIGHT;LEFT

## 2021-01-02 ASSESSMENT — PAIN DESCRIPTION - FREQUENCY: FREQUENCY: CONTINUOUS

## 2021-01-02 NOTE — PROGRESS NOTES
Call placed to Dr Quyen Husain for patient with increase back pain.  One time order for toradol IM see Mar.

## 2021-01-02 NOTE — PROGRESS NOTES
Pt expresses zofran alleviated nausea/ indigested sensation. Education provided to notify staff of future occurrences and how often zofran can be provided during episodes of nausea.

## 2021-01-02 NOTE — PROGRESS NOTES
Physical Therapy  Facility/Department: [facility] ACUTE REHAB UNIT  Daily Treatment Note  NAME: Mario Brewer  : 1945  MRN: [id number]    Date of Service: 2021    Discharge Recommendations:  Home with assist PRN, Patient would benefit from continued therapy after discharge, Home with Home health PT   PT Equipment Recommendations  Other: Ongoing assessment at this time    Assessment   Body structures, Functions, Activity limitations: Decreased functional mobility ; Decreased ADL status; Decreased endurance;Decreased strength;Decreased balance; Increased pain;Decreased ROM  Assessment: Pain continues to be the most limiting factor with pt's progress. The pain is on the R side and not on the side that was fractured although pt c/o increased L groin pain on this date. Pt appears restless and very anxious when pain is elicited. Pt moves slowly and very guarded for this reason. Pt also c/o pain in B shlds and R wrist following amb. Pt is functioning below baseline level and will continue to benefit from skilled physical therapy services to address limitations and maximize potential towards ind and safer d/c home. Treatment Diagnosis: Decreased functional mobility 2/2 to distal femur fx  Prognosis: Good  Decision Making: Medium Complexity  PT Education: Transfer Training;Energy Conservation; Functional Mobility Training;General Safety;Weight-bearing Education; Adaptive Device Training;Precautions;Gait Training  Patient Education: Needs reinforcement of WB precautions  Barriers to Learning: None  REQUIRES PT FOLLOW UP: Yes  Activity Tolerance  Activity Tolerance: Patient limited by fatigue;Patient limited by pain; Patient limited by endurance     Patient Diagnosis(es): There were no encounter diagnoses. has a past medical history of Allergic, Anemia, Arthritis, Chronic kidney disease, Depression, Diabetes mellitus ([address] Disorders affecting multiple structures of eye, Epigastric pain, GERD (gastroesophageal reflux disease), Hyperlipidemia, Hypertension, IBS (irritable bowel syndrome), Intermittent atrial fibrillation ([address] Mass of lung, Migraine, Nausea & vomiting, Status post lung surgery, and Thyroid disease. has a past surgical history that includes brain surgery; joint replacement; Cholecystectomy; Bunionectomy; Hysterectomy; thoracotomy (3/10/11); Endoscopy, colon, diagnostic (8/22/2011); Appendectomy; colectomy; Cataract removal (2012); and knee surgery (Left, 10/11/2016). Restrictions  Restrictions/Precautions  Restrictions/Precautions: Weight Bearing  Required Braces or Orthoses?: Yes(L knee immobilizer)  Lower Extremity Weight Bearing Restrictions  Left Lower Extremity Weight Bearing: Foot Flat Weight Bearing(orders say both TTWB and flat foot WBing)  Position Activity Restriction  Other position/activity restrictions: L TTWB and foot flat WB listed, L knee immobilizer on at all times. As of 12/31/2020, pt is allowed to be untetherered from wall in the w/c on the unit  Subjective   General  Chart Reviewed:  Yes Comment: Pt cont to improve with ability to maintain WB precautions on LLE . Pt moves very slowly and with increased apprehesion 2/2 to fear of falling  Ambulation 1  Surface: level tile  Device: Rolling Walker  Other Apparatus: Wheelchair follow;Knee Immobilizer(R wrist splint)  Assistance: Contact guard assistance;Stand by assistance  Quality of Gait: Step to gt pattern, maintains heel lifted off the floor on the L  Gait Deviations: Slow Concha;Decreased step length;Decreased step height  Distance: 36'. 22', 26'  Propulsion: Yes  Propulsion 1  Propulsion: Manual  Level: Level Tile  Method: RUE;LUE;RLE(Very intermittently will use the R LE in stepping pattern)  Level of Assistance: Modified independent  Distance: 100'   PT instructed pt with the following sitting ex;   1. Ankle pumps  2. SLRs from w/c   Pablo 10 reps of each to BLES   Pt attempted w/c push ups but was unable to complete 2/2 weakness and increased pain in B shlds   Pt remained in w/c with chair alarm intact. G-Code     OutComes Score                                                     AM-PAC Score             Goals  Short term goals  Time Frame for Short term goals: 10 -14 days  Short term goal 1: Ind with bed mobility - ongoing  Short term goal 2: MI with transf with  LRAD maintaining WB precautions - ongoing  Short term goal 3: Pt able to propel w/c  on level surfaces x 300' at a time - ongoing  Short term goal 4: Pt amb with RW ~20' at a time while maintaining WB precautions as outlined by physician - ongoing  Short term goal 5: Pt to amb up and down 2 steps with AD with WB precautions as outlined by physician with CGA - ongoing  Long term goals  Time Frame for Long term goals : STG= LTG  Patient Goals   Patient goals : Get home ASAP.  Also \"not to fall again    Plan    Plan  Times per week: 5-7 x week x 90 minutes  Times per day: Twice a day Current Treatment Recommendations: Strengthening, Transfer Training, Endurance Training, Patient/Caregiver Education & Training, Wheelchair Mobility Training, Pain Management, Balance Training, Gait Training, Functional Mobility Training, Stair training, Safety Education & Training, Positioning  Safety Devices  Type of devices:  All fall risk precautions in place, Chair alarm in place, Left in chair     Therapy Time   Individual Concurrent Group Co-treatment   Time In 0830         Time Out 0930         Minutes 60           Second Session Therapy Time:   Individual Concurrent Group Co-treatment   Time In 1100         Time Out 1135         Minutes 35           Timed Code Treatment Minutes:  60+35    Total Treatment Minutes: 95        Carley Larose, PT

## 2021-01-02 NOTE — PROGRESS NOTES
Department of Physical Medicine & Rehabilitation  Progress Note    Patient Identification:  Sage Licona  [id number]  : 1945  Admit date: 2020    Chief Complaint: L hip fracture     Subjective:   Refusing potassium today. She is still having pain in the right leg and is not improving much with steroids. Continue therapy. ROS: No f/c, n/v, cp     Objective:  Patient Vitals for the past 24 hrs:   BP Temp Temp src Pulse Resp SpO2   21 0730 (!) 154/81 98 °F (36.7 °C) Oral 90 18 93 %   21 2120 (!) 147/81 97.8 °F (36.6 °C) Oral 82 18 95 %     Const: Alert. No distress, pleasant. HEENT: Normocephalic, atraumatic. Normal sclera/conjunctiva. MMM. CV: Regular rate and rhythm. Resp: No respiratory distress. Lungs CTAB. Abd: Soft, nontender, nondistended, NABS+   Ext: + edema bilaterally L>R  Neuro: Alert, oriented, appropriately interactive. Psych: Cooperative, appropriate mood and affect    Laboratory data: Available via EMR.    Last 24 hour lab  Recent Results (from the past 24 hour(s))   POCT Glucose    Collection Time: 21 11:55 AM   Result Value Ref Range    POC Glucose 182 (H) 70 - 99 mg/dl    Performed on ACCU-CHEK    POCT Glucose    Collection Time: 21  4:53 PM   Result Value Ref Range    POC Glucose 187 (H) 70 - 99 mg/dl    Performed on ACCU-CHEK    POCT Glucose    Collection Time: 21  9:39 PM   Result Value Ref Range    POC Glucose 171 (H) 70 - 99 mg/dl    Performed on ACCU-CHEK    POCT Glucose    Collection Time: 21  7:57 AM   Result Value Ref Range    POC Glucose 153 (H) 70 - 99 mg/dl    Performed on ACCU-CHEK        Therapy progress:  PT  Position Activity Restriction  Other position/activity restrictions: L TTWB and foot flat WB listed, L knee immobilizer on at all times  Objective     Sit to Stand: Stand by assistance, Contact guard assistance(STS from w/c  and commode to a RW with TTWB on the LLE) Stand to sit: Contact guard assistance, Stand by assistance(VC for hand placement)  Device: Rolling Walker  Other Apparatus: Wheelchair follow, Knee Immobilizer(R wrist splint)  Assistance: Contact guard assistance, Stand by assistance  Distance: 15' x2 BS chair<> commode  OT  PT Equipment Recommendations  Other: Ongoing assessment at this time  Toilet - Technique: Stand pivot  Equipment Used: Raised toilet seat with rails  Toilet Transfers Comments: use of GB; VCs to maintain WBing precautions  Assessment        SLP          Body mass index is 33.29 kg/m². Assessment and Plan:   Ms. Brianna Stoddard will require ongoing medical management and daily physician oversight for the followin. Left femur fracture. - s/p ORIF 2020. Patient will proceed with treatment per orthopedic protocol.   - PT/OT required      2. Diabetes. - home  Metformin started. D/C insulin     3. Coagulopathy. (heterozygous factor V Leiden deficiency). - continue Eliquis 2.5 mg by mouth twice a day per hematology recommendations.     4. Coronary artery disease.   - continue Clopidogrel      5. UTI. Patient's preprocedure urinalysis is consistent with urinary tract infection, greater than 100,000 CFU Klebsiella pneumonia. - Continue Rocephin 1 g IV   - resolved    6. Normocytic anemia. Hemoglobin on admission was 11.8.   - Hgb on admission 11.8, Hgb 8.8 yesterday.  Continue to monitor.     7. Multi-level Degenerative Disc Disease  - Lumbar MRI on 20 with L4-5 circumferential disc bulge with a right synovial facet cyst in the posterior right epidural space resulting in severe stenosis of the right subarticular zone/lateral recess, with mass effect on the descending right nerve roots  - Ortho consulted and recs appreciated  - will start Prednisone taper    Rehab Progress: Improving  Anticipated Dispo: home  Services/DME: REFUGIO  ELOS: REFUGIO Higgins D.O. M.P.H  PM&R  2021  11:01 AM

## 2021-01-02 NOTE — PROGRESS NOTES
Assessment completed, medications given. Fall precautions are in place, call light and bedside table are within reach. Patient is aware to call for assistance to transfer.

## 2021-01-02 NOTE — PROGRESS NOTES
Pt assessment and vitals completed. Medications administered as ordered. Pt complaining of indigestion epigastric/chest pressure and nausea, zofran administered , pt resting in bed. Will continue to monitor.

## 2021-01-02 NOTE — PROGRESS NOTES
Occupational Therapy  Facility/Department: [facility] ACUTE REHAB UNIT  Daily Treatment Note  NAME: Yazmin Montes De Oca  : 1945  MRN: [id number]    Date of Service: 2021    Discharge Recommendations:  Continue to assess pending progress, 24 hour supervision or assist, Home with Home health OT  OT Equipment Recommendations  ADL Assistive Devices: Shower Chair with back  Other: continue to assess pending progress    Assessment   Performance deficits / Impairments: Decreased functional mobility ; Decreased endurance;Decreased ADL status; Decreased balance;Decreased strength;Decreased high-level IADLs;Decreased fine motor control  Assessment: Pt participated in w/c mobility in room, shower chair transfer, and ADLs (undressing, bathing). Pt was doing well w/ ADL until she developed acute low back pain w/ radiation down RLE. Pt tearful and highly anxious 2* pain - reporting this back/RLE pain is worse than surgical LLE pain - nurses present/alerted. Pt did utilize AE appropriately for LB undressing. Pt will benefit from continued OT. Continue per POC. Plans are for home at discharge w/ dtr and  assist.  Treatment Diagnosis: decreased ADL status, functional transfers and functional mobility 2/2 L distal femur fx  Prognosis: Good  OT Education: Precautions; ADL Adaptive Strategies;OT Role;Transfer Training  Patient Education: pt verb understanding  REQUIRES OT FOLLOW UP: Yes  Activity Tolerance  Activity Tolerance: Patient limited by pain  Activity Tolerance: Pt req intermittent rest breaks throughout ADLs d/t fatigue  Safety Devices  Safety Devices in place: Yes  Type of devices: Left in chair;Call light within reach; Chair alarm in place;Nurse notified(nurses at bedside addressing pt's pain; knee immobilizer repositioned for better alignment as it slid down during ADL); nurse aware of need to rebandage wound - bandages wet from shower)         Patient Diagnosis(es): There were no encounter diagnoses. has a past medical history of Allergic, Anemia, Arthritis, Chronic kidney disease, Depression, Diabetes mellitus ([address] Disorders affecting multiple structures of eye, Epigastric pain, GERD (gastroesophageal reflux disease), Hyperlipidemia, Hypertension, IBS (irritable bowel syndrome), Intermittent atrial fibrillation ([address] Mass of lung, Migraine, Nausea & vomiting, Status post lung surgery, and Thyroid disease. has a past surgical history that includes brain surgery; joint replacement; Cholecystectomy; Bunionectomy; Hysterectomy; thoracotomy (3/10/11); Endoscopy, colon, diagnostic (8/22/2011); Appendectomy; colectomy; Cataract removal (2012); and knee surgery (Left, 10/11/2016). Restrictions  Restrictions/Precautions  Restrictions/Precautions: Weight Bearing  Required Braces or Orthoses?: Yes(L knee immobilizer)  Lower Extremity Weight Bearing Restrictions  Left Lower Extremity Weight Bearing: Foot Flat Weight Bearing(orders say both TTWB and flat foot WBing)  Position Activity Restriction  Other position/activity restrictions: L TTWB and foot flat WB listed, L knee immobilizer on at all times. As of 12/31/2020, pt is allowed to be untetherered from wall in the w/c on the unit  Subjective   General  Chart Reviewed: Yes  Patient assessed for rehabilitation services?: Yes  Additional Pertinent Hx: 65 y/o F with mechanical fall at home in bathroom. Imaging revealed left distal femur fracture and is now TTWB on LLE.  PMH significant for chronic back pain, bilateral total knee arthroplasties, & Diabetes  Family / Caregiver Present: No  Referring Practitioner: Dr Tina Payne  Diagnosis: L distal femur fracture  Subjective Subjective: In w/c on entry - LLE elevated on w/c footrest. \"I stink. What I really need is a shower. \"  When showering, pt developed acute pain low back w/ radiation down R leg. Pt was in tears and crying. Nurse alerted for assistance. Pt indicating the lower back/RLE pain was worse than operative pain. Vital Signs  Patient Currently in Pain: Yes(lower back pain radiating down R leg - nurse informed; pt tearful and crying)   Orientation  Orientation  Overall Orientation Status: Within Functional Limits  Objective    ADL  UE Bathing: Minimal assistance(to dry back (2* back and RLE pain - pt in tears and crying))  LE Bathing: Moderate assistance(pt was able to wash RLE, frontal pericare seated on TTB-Supervision; in standing w/ UE support of grab bar, able to wash bottom w/ UE support of grab bar; pt required assist to dry LEs, buttocks, and periarea 2* acute low back pain w/ radiation down RLE)  UE Dressing: Minimal assistance(to fasten bra; donned shirt w/o difficulty (assistance provided 2* pain))  LE Dressing: Moderate assistance(able to doff pants/underwear and socks using AE (from seated position); Max A to don underwear (full assist to thread feet, Mod A to pullup pants over hips standing at walker)- prior to w/c > bed transfer)  Additional Comments: Pt required intermittent VCs to maintain LLE TTWB precaution while in stance for LB bathing and dressing. Required increased assist w/ ADLs w/ onset of low back pain - pt became highly anxious during the shower and increased assist provided 2* development of acute back pain. Nurse present and aware.         Balance  Standing Balance: Contact guard assistance(standing in shower w/ bilateral UE support of grab bar; Min A w/ posterior hygiene)  Functional Mobility  Functional - Mobility Device: Wheelchair  Activity: To/from bathroom Functional Mobility Comments: SBA into bathroom doorway, then Min A to manuever w/c in prep for shower chair transfer; Max A from bathroom to beside 2* pain (nurses present). Shower Transfers  Shower - Transfer From: Wheelchair  Shower - Transfer Type: To and From  Shower - Transfer To: Transfer tub bench  Shower - Technique: Stand pivot  Shower Transfers: Minimal assistance  Shower Transfers Comments: use of GB; VCs to maintain WBing precautions  Bed mobility  Sit to Supine: Maximum assistance(for trunk and BLEs)                          Cognition  Overall Cognitive Status: WFL  Cognition Comment: pleasant, cooperative, following directions consistently until onset of acute back pain. Plan   Plan  Times per week: 90 minutes per day 5 days/week  Times per day: Daily  Current Treatment Recommendations: Strengthening, Patient/Caregiver Education & Training, Home Management Training, Equipment Evaluation, Education, & procurement, Balance Training, Functional Mobility Training, Endurance Training, Safety Education & Training, Self-Care / ADL, Wheelchair Mobility Training                                                                 Goals  Short term goals  Time Frame for Short term goals: STG=LTG  Long term goals  Time Frame for Long term goals : 2 weeks-all ongoing  Long term goal 1: Pt will complete LB dressing with use of AE prn with spvn (not met)  Long term goal 2: Pt will complete UB dressing with mod I (not met)  Long term goal 3: Pt will complete bathing with spvn (not met)  Long term goal 4: Pt will complete toilet transfer with spvn maintaining weightbearing restrictions (not met)  Long term goal 5: Pt will complete toileting with spvn (not met)  Patient Goals   Patient goals :  To be more independent       Therapy Time   Individual Concurrent Group Co-treatment   Time In 1245         Time Out 1345         Minutes 60             Timed Code Treatment Minutes:  60 Total Treatment Minutes:  60    Minute Variance: 30 minutes (2* pain)         Krupa Ford OTR/L #[id number]

## 2021-01-02 NOTE — PLAN OF CARE
Problem: Pain:  Goal: Pain level will decrease  Outcome: Ongoing     Problem: Pain:  Goal: Pain level will decrease  Outcome: Ongoing     Problem: Pain:  Goal: Control of chronic pain  Outcome: Ongoing  Patient pain continues is managed with pharmacologic/ non pharmacologic interventions, continue to assess.

## 2021-01-03 LAB
ALBUMIN SERPL-MCNC: 3.5 G/DL (ref 3.4–5)
ANION GAP SERPL CALCULATED.3IONS-SCNC: 11 MMOL/L (ref 3–16)
ANION GAP SERPL CALCULATED.3IONS-SCNC: 11 MMOL/L (ref 3–16)
BUN BLDV-MCNC: 28 MG/DL (ref 7–20)
BUN BLDV-MCNC: 28 MG/DL (ref 7–20)
CALCIUM SERPL-MCNC: 8.7 MG/DL (ref 8.3–10.6)
CALCIUM SERPL-MCNC: 8.7 MG/DL (ref 8.3–10.6)
CHLORIDE BLD-SCNC: 96 MMOL/L (ref 99–110)
CHLORIDE BLD-SCNC: 98 MMOL/L (ref 99–110)
CO2: 21 MMOL/L (ref 21–32)
CO2: 21 MMOL/L (ref 21–32)
CREAT SERPL-MCNC: 1.1 MG/DL (ref 0.6–1.2)
CREAT SERPL-MCNC: 1.1 MG/DL (ref 0.6–1.2)
GFR AFRICAN AMERICAN: 58
GFR AFRICAN AMERICAN: 58
GFR NON-AFRICAN AMERICAN: 48
GFR NON-AFRICAN AMERICAN: 48
GLUCOSE BLD-MCNC: 147 MG/DL (ref 70–99)
GLUCOSE BLD-MCNC: 153 MG/DL (ref 70–99)
GLUCOSE BLD-MCNC: 166 MG/DL (ref 70–99)
GLUCOSE BLD-MCNC: 177 MG/DL (ref 70–99)
GLUCOSE BLD-MCNC: 193 MG/DL (ref 70–99)
GLUCOSE BLD-MCNC: 214 MG/DL (ref 70–99)
GLUCOSE BLD-MCNC: 238 MG/DL (ref 70–99)
HCT VFR BLD CALC: 24.4 % (ref 36–48)
HEMOGLOBIN: 8 G/DL (ref 12–16)
MCH RBC QN AUTO: 30.2 PG (ref 26–34)
MCHC RBC AUTO-ENTMCNC: 32.9 G/DL (ref 31–36)
MCV RBC AUTO: 91.7 FL (ref 80–100)
PDW BLD-RTO: 17.3 % (ref 12.4–15.4)
PERFORMED ON: ABNORMAL
PHOSPHORUS: 3 MG/DL (ref 2.5–4.9)
PLATELET # BLD: 440 K/UL (ref 135–450)
PMV BLD AUTO: 8.3 FL (ref 5–10.5)
POTASSIUM SERPL-SCNC: 5.7 MMOL/L (ref 3.5–5.1)
POTASSIUM SERPL-SCNC: 6 MMOL/L (ref 3.5–5.1)
RBC # BLD: 2.67 M/UL (ref 4–5.2)
SODIUM BLD-SCNC: 128 MMOL/L (ref 136–145)
SODIUM BLD-SCNC: 130 MMOL/L (ref 136–145)
TROPONIN: 0.02 NG/ML
WBC # BLD: 9.9 K/UL (ref 4–11)

## 2021-01-03 PROCEDURE — 85027 COMPLETE CBC AUTOMATED: CPT

## 2021-01-03 PROCEDURE — 84484 ASSAY OF TROPONIN QUANT: CPT

## 2021-01-03 PROCEDURE — 1280000000 HC REHAB R&B

## 2021-01-03 PROCEDURE — 36415 COLL VENOUS BLD VENIPUNCTURE: CPT

## 2021-01-03 PROCEDURE — 6370000000 HC RX 637 (ALT 250 FOR IP): Performed by: PHYSICAL MEDICINE & REHABILITATION

## 2021-01-03 PROCEDURE — 6370000000 HC RX 637 (ALT 250 FOR IP): Performed by: STUDENT IN AN ORGANIZED HEALTH CARE EDUCATION/TRAINING PROGRAM

## 2021-01-03 PROCEDURE — 93005 ELECTROCARDIOGRAM TRACING: CPT | Performed by: PHYSICAL MEDICINE & REHABILITATION

## 2021-01-03 PROCEDURE — 2580000003 HC RX 258: Performed by: PHYSICAL MEDICINE & REHABILITATION

## 2021-01-03 PROCEDURE — 6370000000 HC RX 637 (ALT 250 FOR IP): Performed by: PHYSICIAN ASSISTANT

## 2021-01-03 PROCEDURE — 80069 RENAL FUNCTION PANEL: CPT

## 2021-01-03 RX ORDER — 0.9 % SODIUM CHLORIDE 0.9 %
500 INTRAVENOUS SOLUTION INTRAVENOUS ONCE
Status: COMPLETED | OUTPATIENT
Start: 2021-01-03 | End: 2021-01-03

## 2021-01-03 RX ORDER — SODIUM CHLORIDE 9 MG/ML
INJECTION, SOLUTION INTRAVENOUS CONTINUOUS
Status: ACTIVE | OUTPATIENT
Start: 2021-01-03 | End: 2021-01-03

## 2021-01-03 RX ADMIN — ALPRAZOLAM 0.5 MG: 0.5 TABLET ORAL at 23:19

## 2021-01-03 RX ADMIN — PREDNISONE 15 MG: 10 TABLET ORAL at 08:18

## 2021-01-03 RX ADMIN — POTASSIUM CHLORIDE 20 MEQ: 1500 TABLET, EXTENDED RELEASE ORAL at 07:59

## 2021-01-03 RX ADMIN — TIZANIDINE 4 MG: 4 TABLET ORAL at 23:19

## 2021-01-03 RX ADMIN — DICLOFENAC 2 G: 10 GEL TOPICAL at 19:58

## 2021-01-03 RX ADMIN — ERGOCALCIFEROL 50000 UNITS: 1.25 CAPSULE ORAL at 07:59

## 2021-01-03 RX ADMIN — DIPHENOXYLATE HYDROCHLORIDE AND ATROPINE SULFATE 1 TABLET: 2.5; .025 TABLET ORAL at 19:57

## 2021-01-03 RX ADMIN — INSULIN LISPRO 2 UNITS: 100 INJECTION, SOLUTION INTRAVENOUS; SUBCUTANEOUS at 17:25

## 2021-01-03 RX ADMIN — METFORMIN HYDROCHLORIDE 1000 MG: 1000 TABLET ORAL at 19:58

## 2021-01-03 RX ADMIN — SERTRALINE HYDROCHLORIDE 100 MG: 100 TABLET ORAL at 08:00

## 2021-01-03 RX ADMIN — METOPROLOL SUCCINATE 50 MG: 50 TABLET, EXTENDED RELEASE ORAL at 07:59

## 2021-01-03 RX ADMIN — LEVOTHYROXINE SODIUM 125 MCG: 0.03 TABLET ORAL at 06:38

## 2021-01-03 RX ADMIN — TRAMADOL HYDROCHLORIDE 50 MG: 50 TABLET, COATED ORAL at 00:34

## 2021-01-03 RX ADMIN — GABAPENTIN 400 MG: 400 CAPSULE ORAL at 08:19

## 2021-01-03 RX ADMIN — INSULIN LISPRO 2 UNITS: 100 INJECTION, SOLUTION INTRAVENOUS; SUBCUTANEOUS at 08:06

## 2021-01-03 RX ADMIN — METFORMIN HYDROCHLORIDE 500 MG: 500 TABLET ORAL at 07:59

## 2021-01-03 RX ADMIN — GABAPENTIN 400 MG: 400 CAPSULE ORAL at 19:57

## 2021-01-03 RX ADMIN — TRAMADOL HYDROCHLORIDE 50 MG: 50 TABLET, COATED ORAL at 20:56

## 2021-01-03 RX ADMIN — ONDANSETRON 4 MG: 4 TABLET, ORALLY DISINTEGRATING ORAL at 10:20

## 2021-01-03 RX ADMIN — HYDROCHLOROTHIAZIDE 25 MG: 25 TABLET ORAL at 07:59

## 2021-01-03 RX ADMIN — SODIUM CHLORIDE: 9 INJECTION, SOLUTION INTRAVENOUS at 14:31

## 2021-01-03 RX ADMIN — PRAVASTATIN SODIUM 10 MG: 10 TABLET ORAL at 00:34

## 2021-01-03 RX ADMIN — ALPRAZOLAM 0.5 MG: 0.5 TABLET ORAL at 12:25

## 2021-01-03 RX ADMIN — INSULIN LISPRO 1 UNITS: 100 INJECTION, SOLUTION INTRAVENOUS; SUBCUTANEOUS at 19:59

## 2021-01-03 RX ADMIN — TIZANIDINE 4 MG: 4 TABLET ORAL at 12:22

## 2021-01-03 RX ADMIN — DICLOFENAC 2 G: 10 GEL TOPICAL at 08:00

## 2021-01-03 RX ADMIN — CLOPIDOGREL BISULFATE 75 MG: 75 TABLET ORAL at 07:59

## 2021-01-03 RX ADMIN — TRAMADOL HYDROCHLORIDE 50 MG: 50 TABLET, COATED ORAL at 07:59

## 2021-01-03 RX ADMIN — APIXABAN 2.5 MG: 5 TABLET, FILM COATED ORAL at 08:00

## 2021-01-03 RX ADMIN — INSULIN LISPRO 2 UNITS: 100 INJECTION, SOLUTION INTRAVENOUS; SUBCUTANEOUS at 12:09

## 2021-01-03 RX ADMIN — GABAPENTIN 400 MG: 400 CAPSULE ORAL at 12:25

## 2021-01-03 RX ADMIN — APIXABAN 2.5 MG: 5 TABLET, FILM COATED ORAL at 19:58

## 2021-01-03 RX ADMIN — SODIUM CHLORIDE 500 ML: 9 INJECTION, SOLUTION INTRAVENOUS at 13:59

## 2021-01-03 RX ADMIN — HYDROCODONE BITARTRATE AND ACETAMINOPHEN 2 TABLET: 5; 325 TABLET ORAL at 10:47

## 2021-01-03 RX ADMIN — PANTOPRAZOLE SODIUM 40 MG: 40 TABLET, DELAYED RELEASE ORAL at 06:38

## 2021-01-03 RX ADMIN — PRAVASTATIN SODIUM 10 MG: 10 TABLET ORAL at 19:57

## 2021-01-03 ASSESSMENT — PAIN SCALES - GENERAL
PAINLEVEL_OUTOF10: 6
PAINLEVEL_OUTOF10: 8
PAINLEVEL_OUTOF10: 8
PAINLEVEL_OUTOF10: 0

## 2021-01-03 ASSESSMENT — PAIN DESCRIPTION - PROGRESSION
CLINICAL_PROGRESSION: NOT CHANGED
CLINICAL_PROGRESSION: NOT CHANGED

## 2021-01-03 ASSESSMENT — PAIN DESCRIPTION - PAIN TYPE
TYPE: SURGICAL PAIN
TYPE: SURGICAL PAIN

## 2021-01-03 ASSESSMENT — PAIN DESCRIPTION - FREQUENCY
FREQUENCY: INTERMITTENT
FREQUENCY: INTERMITTENT

## 2021-01-03 ASSESSMENT — PAIN DESCRIPTION - ONSET: ONSET: ON-GOING

## 2021-01-03 ASSESSMENT — PAIN - FUNCTIONAL ASSESSMENT: PAIN_FUNCTIONAL_ASSESSMENT: PREVENTS OR INTERFERES SOME ACTIVE ACTIVITIES AND ADLS

## 2021-01-03 ASSESSMENT — PAIN DESCRIPTION - LOCATION
LOCATION: HIP;BACK;LEG
LOCATION: HIP

## 2021-01-03 ASSESSMENT — PAIN DESCRIPTION - DESCRIPTORS: DESCRIPTORS: SHOOTING

## 2021-01-03 ASSESSMENT — PAIN DESCRIPTION - ORIENTATION: ORIENTATION: LEFT

## 2021-01-03 NOTE — PROGRESS NOTES
Rapid response called 14:37. Patient had to be placed on oxygen. States she having some trouble getting a deep breath. Ordered EKG and Lab at bedside. 14:40 MD's at bedside to assess patient. Denies chest pain. No EKG changes. Troponin ordered. Sat patient back up to help with breathing. Patient feeling better. MD's will view labs once resulted.

## 2021-01-03 NOTE — PLAN OF CARE
Problem: Pain:  Goal: Pain level will decrease  Description: Pain level will decrease  Outcome: Ongoing  Note: Pt surgical pain managed with PRN pain medication. Problem: Falls - Risk of:  Goal: Will remain free from falls  Description: Will remain free from falls  Outcome: Ongoing  Note: Pt is a High fall risk. Explained fall risk precautions to pt and rationale behind their use to keep the patient safe. Belongings are in reach. Pt encouraged to notify staff for any and all assistance. Staff present in tx suite throughout entirety of pts treatment to monitor and protect from falls. Assistance provided when ambulating to restroom utilizing Stay With Me. Problem: Skin Integrity:  Goal: Absence of new skin breakdown  Description: Absence of new skin breakdown  Outcome: Ongoing  Note: No evidence of skin breakdown.

## 2021-01-03 NOTE — PROGRESS NOTES
Patient requested charge nurse at bedside. Patient stated she felt like she was going to pass out and was diaphoretic. . BP 83/49 HR 57. Placed patient in reverse trendelenburg. Informed RN and called Dr Thor Hoff at 1:47pm. IV started and bolus ordered. Patient feeling better.

## 2021-01-03 NOTE — PROGRESS NOTES
Department of Physical Medicine & Rehabilitation  Progress Note    Patient Identification:  Kvng Silveira  [id number]  : 1945  Admit date: 2020    Chief Complaint: L hip fracture     Subjective:   Pain in the right leg is improving today. She thinks the steroid taper is working and she is willing to work in therapy. No new complaints. ROS: No f/c, n/v, cp     Objective:  Patient Vitals for the past 24 hrs:   BP Temp Temp src Pulse Resp SpO2   21 0745 122/78 97.5 °F (36.4 °C) Oral 66 18 97 %   21 2207 120/71 97.7 °F (36.5 °C) Oral 67 18 92 %     Const: Alert. No distress, pleasant. HEENT: Normocephalic, atraumatic. Normal sclera/conjunctiva. MMM. CV: Regular rate and rhythm. Resp: No respiratory distress. Lungs CTAB. Abd: Soft, nontender, nondistended, NABS+   Ext: + edema bilaterally L>R  Neuro: Alert, oriented, appropriately interactive. Psych: Cooperative, appropriate mood and affect    Laboratory data: Available via EMR.    Last 24 hour lab  Recent Results (from the past 24 hour(s))   POCT Glucose    Collection Time: 21  4:59 PM   Result Value Ref Range    POC Glucose 227 (H) 70 - 99 mg/dl    Performed on ACCU-CHEK    POCT Glucose    Collection Time: 21  7:28 PM   Result Value Ref Range    POC Glucose 196 (H) 70 - 99 mg/dl    Performed on ACCU-CHEK    POCT Glucose    Collection Time: 21 10:11 PM   Result Value Ref Range    POC Glucose 151 (H) 70 - 99 mg/dl    Performed on ACCU-CHEK    POCT Glucose    Collection Time: 21  7:40 AM   Result Value Ref Range    POC Glucose 153 (H) 70 - 99 mg/dl    Performed on ACCU-CHEK    POCT Glucose    Collection Time: 21 11:41 AM   Result Value Ref Range    POC Glucose 193 (H) 70 - 99 mg/dl    Performed on ACCU-CHEK        Therapy progress:  PT  Position Activity Restriction Other position/activity restrictions: L TTWB and foot flat WB listed, L knee immobilizer on at all times. As of 2020, pt is allowed to be untetherered from wall in the w/c on the unit  Objective     Sit to Stand: Stand by assistance(STS from w/c , mat table and commode to a RW with TTWB on the LLE)  Stand to sit: Supervision(VC for hand placement)  Device: Rolling Walker  Other Apparatus: Wheelchair follow, Knee Immobilizer(R wrist splint)  Assistance: Contact guard assistance, Stand by assistance  Distance: 15' x2 BS chair<> commode, 30' x1  OT  PT Equipment Recommendations  Other: Ongoing assessment at this time  Toilet - Technique: Stand pivot  Equipment Used: Raised toilet seat with rails  Toilet Transfers Comments: use of GB; VCs to maintain WBing precautions  Assessment        SLP          Body mass index is 33.29 kg/m². Assessment and Plan:   Ms. Tiffanie Eaton will require ongoing medical management and daily physician oversight for the followin. Left femur fracture. - s/p ORIF 2020. Patient will proceed with treatment per orthopedic protocol.   - PT/OT required      2. Diabetes. - home  Metformin started. D/C insulin     3. Coagulopathy. (heterozygous factor V Leiden deficiency). - continue Eliquis 2.5 mg by mouth twice a day per hematology recommendations.     4. Coronary artery disease.   - continue Clopidogrel      5. UTI. Patient's preprocedure urinalysis is consistent with urinary tract infection, greater than 100,000 CFU Klebsiella pneumonia. - Continue Rocephin 1 g IV   - resolved    6. Normocytic anemia. Hemoglobin on admission was 11.8.   - Hgb on admission 11.8, Hgb 8.8 yesterday.  Continue to monitor.     7. Multi-level Degenerative Disc Disease - Lumbar MRI on 12/17/20 with L4-5 circumferential disc bulge with a right synovial facet cyst in the posterior right epidural space resulting in severe stenosis of the right subarticular zone/lateral recess, with mass effect on the descending right nerve roots  - Ortho consulted and recs appreciated  - will start Prednisone taper    Rehab Progress: Improving  Anticipated Dispo: home  Services/DME: REFUGIO  ELOS: REFUGIO De Guzman D.O. M.P.H  PM&R  1/3/2021  12:54 PM

## 2021-01-03 NOTE — SIGNIFICANT EVENT
Rapid response was called at [phone]. Per RN, pt was hypotensive with SBP in 80s and very lethargic. Pt endorsed having some trouble breathing as well. She denied palpitations, CP. EKG showed sinus bradycardia with R BBB and  T- wave inversions. Troponin ordered. Renal function panel ordered. Bolus running. Pt reported feeling better. Will follow up on labs.     Herminio Libman, MD  IM, PGY-1

## 2021-01-03 NOTE — PLAN OF CARE
Problem: Pain:  Goal: Pain level will decrease  Description: Pain level will decrease  1/3/2021 0825 by Garima Kim RN  Outcome: Ongoing  Note: PRN tramadol given for pain with morning assessment, pt repositioned, distraction and food in use as nonpharmacological measures of pain reduction     Problem: Falls - Risk of:  Goal: Will remain free from falls  Description: Will remain free from falls  1/3/2021 0825 by Garima Kim RN  Outcome: Ongoing  Note: Nonskid footwear on,  chair alarm on, call light and bed table within reach. Patient is high fall risk, x1 pivot to wheelchair for safe transfers     Problem: Falls - Risk of:  Goal: Absence of physical injury  Description: Absence of physical injury  Outcome: Ongoing  Note: Fall precautions in place, room free of clutter, patient oriented to room and call light use.

## 2021-01-03 NOTE — PROGRESS NOTES
Pt A & O. VSS. Pt x 1 SP to WC. All safety precautions in place. Bed alarm activated. Non skid socks on. Pt c/o back pain managed with PRN pain medication. No complaints of nausea. Low appetite. Will cont to monitor.

## 2021-01-04 LAB
ANION GAP SERPL CALCULATED.3IONS-SCNC: 10 MMOL/L (ref 3–16)
BASOPHILS ABSOLUTE: 0.1 K/UL (ref 0–0.2)
BASOPHILS RELATIVE PERCENT: 1.1 %
BUN BLDV-MCNC: 24 MG/DL (ref 7–20)
CALCIUM SERPL-MCNC: 9.1 MG/DL (ref 8.3–10.6)
CHLORIDE BLD-SCNC: 100 MMOL/L (ref 99–110)
CO2: 23 MMOL/L (ref 21–32)
CREAT SERPL-MCNC: 1.1 MG/DL (ref 0.6–1.2)
EKG ATRIAL RATE: 59 BPM
EKG DIAGNOSIS: NORMAL
EKG P AXIS: -17 DEGREES
EKG P-R INTERVAL: 152 MS
EKG Q-T INTERVAL: 528 MS
EKG QRS DURATION: 122 MS
EKG QTC CALCULATION (BAZETT): 522 MS
EKG R AXIS: 78 DEGREES
EKG T AXIS: 4 DEGREES
EKG VENTRICULAR RATE: 59 BPM
EOSINOPHILS ABSOLUTE: 0.4 K/UL (ref 0–0.6)
EOSINOPHILS RELATIVE PERCENT: 4.8 %
GFR AFRICAN AMERICAN: 58
GFR NON-AFRICAN AMERICAN: 48
GLUCOSE BLD-MCNC: 129 MG/DL (ref 70–99)
GLUCOSE BLD-MCNC: 143 MG/DL (ref 70–99)
GLUCOSE BLD-MCNC: 182 MG/DL (ref 70–99)
GLUCOSE BLD-MCNC: 191 MG/DL (ref 70–99)
GLUCOSE BLD-MCNC: 237 MG/DL (ref 70–99)
HCT VFR BLD CALC: 26 % (ref 36–48)
HEMOGLOBIN: 8.7 G/DL (ref 12–16)
LYMPHOCYTES ABSOLUTE: 2.5 K/UL (ref 1–5.1)
LYMPHOCYTES RELATIVE PERCENT: 28.1 %
MCH RBC QN AUTO: 30.9 PG (ref 26–34)
MCHC RBC AUTO-ENTMCNC: 33.6 G/DL (ref 31–36)
MCV RBC AUTO: 91.9 FL (ref 80–100)
MONOCYTES ABSOLUTE: 1.2 K/UL (ref 0–1.3)
MONOCYTES RELATIVE PERCENT: 12.9 %
NEUTROPHILS ABSOLUTE: 4.8 K/UL (ref 1.7–7.7)
NEUTROPHILS RELATIVE PERCENT: 53.1 %
PDW BLD-RTO: 17.7 % (ref 12.4–15.4)
PERFORMED ON: ABNORMAL
PLATELET # BLD: 446 K/UL (ref 135–450)
PMV BLD AUTO: 8.4 FL (ref 5–10.5)
POTASSIUM REFLEX MAGNESIUM: 4.3 MMOL/L (ref 3.5–5.1)
RBC # BLD: 2.83 M/UL (ref 4–5.2)
SODIUM BLD-SCNC: 133 MMOL/L (ref 136–145)
WBC # BLD: 9 K/UL (ref 4–11)

## 2021-01-04 PROCEDURE — 97542 WHEELCHAIR MNGMENT TRAINING: CPT | Performed by: PHYSICAL THERAPIST

## 2021-01-04 PROCEDURE — 97110 THERAPEUTIC EXERCISES: CPT | Performed by: PHYSICAL THERAPIST

## 2021-01-04 PROCEDURE — 6370000000 HC RX 637 (ALT 250 FOR IP): Performed by: PHYSICAL MEDICINE & REHABILITATION

## 2021-01-04 PROCEDURE — 36415 COLL VENOUS BLD VENIPUNCTURE: CPT

## 2021-01-04 PROCEDURE — 85025 COMPLETE CBC W/AUTO DIFF WBC: CPT

## 2021-01-04 PROCEDURE — 6370000000 HC RX 637 (ALT 250 FOR IP): Performed by: PHYSICIAN ASSISTANT

## 2021-01-04 PROCEDURE — 93010 ELECTROCARDIOGRAM REPORT: CPT | Performed by: INTERNAL MEDICINE

## 2021-01-04 PROCEDURE — 97116 GAIT TRAINING THERAPY: CPT | Performed by: PHYSICAL THERAPIST

## 2021-01-04 PROCEDURE — 1280000000 HC REHAB R&B

## 2021-01-04 PROCEDURE — 97535 SELF CARE MNGMENT TRAINING: CPT

## 2021-01-04 PROCEDURE — 6370000000 HC RX 637 (ALT 250 FOR IP): Performed by: STUDENT IN AN ORGANIZED HEALTH CARE EDUCATION/TRAINING PROGRAM

## 2021-01-04 PROCEDURE — 97530 THERAPEUTIC ACTIVITIES: CPT | Performed by: PHYSICAL THERAPIST

## 2021-01-04 PROCEDURE — 80048 BASIC METABOLIC PNL TOTAL CA: CPT

## 2021-01-04 RX ADMIN — DICLOFENAC 2 G: 10 GEL TOPICAL at 08:58

## 2021-01-04 RX ADMIN — INSULIN LISPRO 2 UNITS: 100 INJECTION, SOLUTION INTRAVENOUS; SUBCUTANEOUS at 17:03

## 2021-01-04 RX ADMIN — ONDANSETRON 4 MG: 4 TABLET, ORALLY DISINTEGRATING ORAL at 12:49

## 2021-01-04 RX ADMIN — METOPROLOL SUCCINATE 50 MG: 50 TABLET, EXTENDED RELEASE ORAL at 08:58

## 2021-01-04 RX ADMIN — GABAPENTIN 400 MG: 400 CAPSULE ORAL at 20:58

## 2021-01-04 RX ADMIN — TIZANIDINE 4 MG: 4 TABLET ORAL at 17:07

## 2021-01-04 RX ADMIN — ALPRAZOLAM 0.5 MG: 0.5 TABLET ORAL at 09:19

## 2021-01-04 RX ADMIN — CLOPIDOGREL BISULFATE 75 MG: 75 TABLET ORAL at 08:57

## 2021-01-04 RX ADMIN — INSULIN LISPRO 2 UNITS: 100 INJECTION, SOLUTION INTRAVENOUS; SUBCUTANEOUS at 20:58

## 2021-01-04 RX ADMIN — TIZANIDINE 4 MG: 4 TABLET ORAL at 09:19

## 2021-01-04 RX ADMIN — HYDROCHLOROTHIAZIDE 25 MG: 25 TABLET ORAL at 08:57

## 2021-01-04 RX ADMIN — TIZANIDINE 4 MG: 4 TABLET ORAL at 23:16

## 2021-01-04 RX ADMIN — TRAMADOL HYDROCHLORIDE 50 MG: 50 TABLET, COATED ORAL at 09:19

## 2021-01-04 RX ADMIN — PREDNISONE 15 MG: 10 TABLET ORAL at 08:58

## 2021-01-04 RX ADMIN — LEVOTHYROXINE SODIUM 125 MCG: 0.03 TABLET ORAL at 05:16

## 2021-01-04 RX ADMIN — HYDROCODONE BITARTRATE AND ACETAMINOPHEN 2 TABLET: 5; 325 TABLET ORAL at 13:57

## 2021-01-04 RX ADMIN — SERTRALINE HYDROCHLORIDE 100 MG: 100 TABLET ORAL at 08:58

## 2021-01-04 RX ADMIN — GABAPENTIN 400 MG: 400 CAPSULE ORAL at 15:06

## 2021-01-04 RX ADMIN — APIXABAN 2.5 MG: 5 TABLET, FILM COATED ORAL at 20:58

## 2021-01-04 RX ADMIN — METFORMIN HYDROCHLORIDE 1000 MG: 1000 TABLET ORAL at 20:57

## 2021-01-04 RX ADMIN — PANTOPRAZOLE SODIUM 40 MG: 40 TABLET, DELAYED RELEASE ORAL at 05:16

## 2021-01-04 RX ADMIN — DICLOFENAC 2 G: 10 GEL TOPICAL at 20:58

## 2021-01-04 RX ADMIN — METFORMIN HYDROCHLORIDE 500 MG: 500 TABLET ORAL at 08:57

## 2021-01-04 RX ADMIN — PRAVASTATIN SODIUM 10 MG: 10 TABLET ORAL at 20:58

## 2021-01-04 RX ADMIN — ALPRAZOLAM 0.5 MG: 0.5 TABLET ORAL at 23:16

## 2021-01-04 RX ADMIN — ANTACID TABLETS 500 MG: 500 TABLET, CHEWABLE ORAL at 13:57

## 2021-01-04 RX ADMIN — HYDROCODONE BITARTRATE AND ACETAMINOPHEN 2 TABLET: 5; 325 TABLET ORAL at 05:16

## 2021-01-04 RX ADMIN — GABAPENTIN 400 MG: 400 CAPSULE ORAL at 08:58

## 2021-01-04 RX ADMIN — DIPHENOXYLATE HYDROCHLORIDE AND ATROPINE SULFATE 1 TABLET: 2.5; .025 TABLET ORAL at 08:57

## 2021-01-04 RX ADMIN — APIXABAN 2.5 MG: 5 TABLET, FILM COATED ORAL at 08:58

## 2021-01-04 RX ADMIN — INSULIN LISPRO 2 UNITS: 100 INJECTION, SOLUTION INTRAVENOUS; SUBCUTANEOUS at 08:59

## 2021-01-04 RX ADMIN — TRAMADOL HYDROCHLORIDE 50 MG: 50 TABLET, COATED ORAL at 20:57

## 2021-01-04 RX ADMIN — INSULIN LISPRO 2 UNITS: 100 INJECTION, SOLUTION INTRAVENOUS; SUBCUTANEOUS at 12:16

## 2021-01-04 ASSESSMENT — PAIN DESCRIPTION - DESCRIPTORS
DESCRIPTORS: DISCOMFORT
DESCRIPTORS: ACHING;BURNING
DESCRIPTORS: ACHING
DESCRIPTORS: DISCOMFORT
DESCRIPTORS: ACHING;SHARP;SHOOTING

## 2021-01-04 ASSESSMENT — PAIN DESCRIPTION - LOCATION
LOCATION: HIP
LOCATION: BACK

## 2021-01-04 ASSESSMENT — PAIN DESCRIPTION - PAIN TYPE
TYPE: SURGICAL PAIN
TYPE: CHRONIC PAIN
TYPE: CHRONIC PAIN
TYPE: ACUTE PAIN

## 2021-01-04 ASSESSMENT — PAIN DESCRIPTION - FREQUENCY
FREQUENCY: INTERMITTENT

## 2021-01-04 ASSESSMENT — PAIN - FUNCTIONAL ASSESSMENT
PAIN_FUNCTIONAL_ASSESSMENT: PREVENTS OR INTERFERES SOME ACTIVE ACTIVITIES AND ADLS

## 2021-01-04 ASSESSMENT — PAIN SCALES - GENERAL
PAINLEVEL_OUTOF10: 8
PAINLEVEL_OUTOF10: 0
PAINLEVEL_OUTOF10: 7
PAINLEVEL_OUTOF10: 0
PAINLEVEL_OUTOF10: 8
PAINLEVEL_OUTOF10: 6
PAINLEVEL_OUTOF10: 8
PAINLEVEL_OUTOF10: 6

## 2021-01-04 ASSESSMENT — PAIN DESCRIPTION - ORIENTATION
ORIENTATION: LEFT
ORIENTATION: LEFT

## 2021-01-04 ASSESSMENT — PAIN DESCRIPTION - PROGRESSION
CLINICAL_PROGRESSION: NOT CHANGED

## 2021-01-04 ASSESSMENT — PAIN DESCRIPTION - ONSET
ONSET: ON-GOING
ONSET: ON-GOING

## 2021-01-04 NOTE — PROGRESS NOTES
Patient c/o breathing feeling \" heavy  VS obtained, b/p 100/56 P 59 o2 sat 94 placed on 2 l of o2 for comfort. Patient is in bed supine.

## 2021-01-04 NOTE — PLAN OF CARE
Nutrition Problem #1: Inadequate oral intake  Intervention: Food and/or Nutrient Delivery: Continue Current Diet, Continue Oral Nutrition Supplement, Start Oral Nutrition Supplement  Nutritional Goals: Pt to meet >50% of nutritional requirements from diet and ONS

## 2021-01-04 NOTE — PATIENT CARE CONFERENCE
Inpatient Rehabilitation  Weekly Team Conference Note  [facility]  [address]  [phone]  Patient Name: Magaly Sims        MRN: [id number]    : 1945  (66 y.o.)  Gender: female   Referring Practitioner: Dr. Master Rai  Diagnosis: L distal femur fx    The team conference for this patient was held on 2021 at 10:30am by:  Brenton Rai,     PHYSICAL THERAPY:  Bed Mobility: Scooting: Modified independent(Req additional time to complete)    Transfers:  Sit to Stand: Stand by assistance, Supervision(STS from w/c , mat table and commode to a RW with TTWB on the LLE)  Stand to sit: Supervision(VC for hand placement)  Squat Pivot Transfers: (w/c<>shower chair req mod A when leading with L side and min A when leading with the R.( shower chair sat higher than the w/c also enabling an easier transf))  Comment: Pt cont to have problems with the KI. It was all of the way down to her ankle and completely rotated. PT removed the KI and noted that it was wet. PT replaced it with the 2nd KI that was in room while the other dried out to insure skin integrity. PT  continues to provide pt with education on donning the KI most effectively. This req additional time. Skin check performed in which pt has bruising directly behind the knee. PT notified nsg regarding change in status. Stockinette placed on outside of the [facility] which appeared effective with minimizing slippage. Since pt is unable to physically burke the KI by herself, the focus is now on pt being able to instruct another person on its application.     WB Status: LLE with TTWB/ Foot flat WB rstrictions per chart review  Ambulation 1  Surface: level tile  Device: Rolling Walker  Other Apparatus: Wheelchair follow, Knee Immobilizer(R wrist splint)  Assistance: Stand by assistance  Quality of Gait: Step to gt pattern, maintains heel lifted off the floor on the L Gait Deviations: Slow Concha, Decreased step length, Decreased step height  Distance: 15' x1, 25' x1  Comments: Pt req an extended seated rest to stabilize breathing  before resuming activity    QM:  Roll Left and Right  Assistance Needed: Supervision or touching assistance  CARE Score: 4  Discharge Goal: Independent  Sit to Lying  Assistance Needed: Partial/moderate assistance  CARE Score: 3  Discharge Goal: Independent  Lying to Sitting on Side of Bed  Assistance Needed: Partial/moderate assistance  CARE Score: 3  Discharge Goal: Independent  Sit to Stand  Assistance Needed: Partial/moderate assistance  CARE Score: 3  Discharge Goal: Independent  Chair/Bed-to-Chair Transfer  Assistance Needed: Partial/moderate assistance  CARE Score: 3  Discharge Goal: Supervision or touching assistance  Car Transfer  Reason if not Attempted: Not attempted due to environmental limitations  CARE Score: 10  Walk 10 Feet  Reason if not Attempted: Not attempted due to medical condition or safety concerns  CARE Score: 88  Discharge Goal: Not Attempted  Walk 50 Feet with Two Turns  Reason if not Attempted: Not attempted due to medical condition or safety concerns  CARE Score: 88  Discharge Goal: Not Attempted  Walk 150 Feet  Reason if not Attempted: Not attempted due to medical condition or safety concerns  CARE Score: 88  Discharge Goal: Not Attempted  Walking 10 Feet on Uneven Surfaces  Reason if not Attempted: Not attempted due to medical condition or safety concerns  CARE Score: 88  Discharge Goal: Not Attempted  1 Step (Curb)  Reason if not Attempted: Not attempted due to medical condition or safety concerns  CARE Score: 88  Discharge Goal: Supervision or touching assistance  4 Steps  Reason if not Attempted: Not attempted due to medical condition or safety concerns  CARE Score: 88  Discharge Goal: Not Attempted  12 Steps  Reason if not Attempted: Not attempted due to medical condition or safety concerns  CARE Score: 88 Discharge Goal: Not Attempted  Wheelchair Ability  Uses a Wheelchair and/or Scooter?: Yes    OCCUPATIONAL THERAPY:  ADL  Equipment Provided: Reacher  Feeding: Independent(Lunch tray provided at end of session)  Grooming: Independent(Pt applied lotion seated in w/c)  UE Bathing: Supervision, Setup  LE Bathing: Supervision, Setup  UE Dressing: Supervision, Setup(vcs to snap bra then don over head)  LE Dressing: Supervision, Setup  Toileting: Contact guard assistance, Setup(Pt completed pants mgmt down in stance at GB w/CGA; Pt voided and completed pericare seated w/setup of wipes)  Additional Comments: Pt required intermittent VCs to maintain LLE TTWB precaution while in stance for LB bathing and dressing. Required increased assist w/ ADLs w/ onset of low back pain - pt became highly anxious during the shower and increased assist provided 2* development of acute back pain. Nurse present and aware. Toilet Transfers: Toilet Transfers  Toilet - Technique: Stand pivot  Equipment Used: Raised toilet seat with rails  Toilet Transfer: Contact guard assistance  Toilet Transfers Comments: use of GB; VCs to maintain WBing precautions    Tub/ShowerTransfers:  Tub Transfers  Tub - Transfer From: Wheelchair  Tub - Transfer Type: To and From  Tub - Transfer To: Transfer tub bench  Tub - Technique: Lateral  Tub Transfers: Moderate assistance  Tub Transfers Comments: VCs for hand placement, assist to manage LLE d/t knee immobilizer and increased pain in/out of tub(mod A to L into tub; min A to w/c leading to R)  Shower Transfers  Shower - Transfer From: Wheelchair  Shower - Transfer Type: To and From  Shower - Transfer To:  Transfer tub bench  Shower - Technique: Stand pivot  Shower Transfers: Minimal assistance  Shower Transfers Comments: use of GB; VCs to maintain WBing precautions    QM:  Eating  Assistance Needed: Independent  CARE Score: 6  Discharge Goal: Independent  Oral Hygiene Assistance Needed: Setup or clean-up assistance  CARE Score: 5  Discharge Goal: Independent  Toileting Hygiene  Assistance Needed: Supervision or touching assistance  CARE Score: 4  Discharge Goal: Supervision or touching assistance  Toilet Transfer  Assistance Needed: Supervision or touching assistance  CARE Score: 4  Discharge Goal: Supervision or touching assistance  Shower/Bathe Self  Assistance Needed: Supervision or touching assistance  Comment: sponge bath in bed/EOB  CARE Score: 4  Discharge Goal: Supervision or touching assistance  Upper Body Dressing  Assistance Needed: Supervision or touching assistance  CARE Score: 4  Discharge Goal: Independent  Lower Body Dressing  Assistance Needed: Supervision or touching assistance  CARE Score: 4  Discharge Goal: Supervision or touching assistance  Putting On/Taking Off Footwear  Assistance Needed: Dependent  CARE Score: 1  Discharge Goal: Independent    NUTRITION:  Please see nutrition note for details. Weight: 194 lb 14.2 oz (88.4 kg) / Body mass index is 35.65 kg/m². Diet Level/Order: DIET CARB CONTROL; Carb Control: 5 carb choices (75 gms)/meal  Dietary Nutrition Supplements: Clear Liquid Oral Supplement  PO Meals Eaten (%): 26 - 50%    NURSING:  QM: Bladder Continence: Incontinent less than daily  Bowel Continence: Always continent  Gomes Fall Risk Score: High 80  Wounds/Incisions/Ulcers: Incision to left hip  Medication Review: Reviewed with patient  Pain: C/o back and leg pain 8-10/10 managed with PRN pain medication. Consultations/Labs/X-rays: CBC & BMP MWF    Patient/Family Education provided by team:  Role of PT/OT, WBing status, transfer training, LB dressing    CASE MANAGEMENT:  Assessment:  SW will continue to follow to update patient and family and assist with discharge planning. TEAM SUMMARY: Will continue with current poc & goals until anticipated d/c date of 1/8/2021. Plan to have daughter come in for family training prior to d/c. Discharge Plan:  Risk for Readmission: 18, Moderate (10-19)  Critical Items: If High Risk, consider the following recommendations:Patient not at high risk  Estimated Length of Stay: 17 days  Destination: home health  Services at Discharge: [address] [place] [address], Occupational Therapy and Nursing 3x week  Equipment at Discharge: wheelchair   Community Resources:   Factors facilitating achievement of predicted outcomes: Family support, Motivated, Cooperative, Pleasant, Sense of humor and Good insight into deficits  Barriers to the achievement of predicted outcomes: Pain, Decreased endurance, Upper extremity weakness, Lower extremity weakness, Long standing deficits and Medical complications    Interdisciplinary Individualized Plan of Care Review:    · Continue Current Plan of Care: Yes    · Modifications:_____________________________     Special Needs in the Upcoming Week:    [x] Family/Caregiver Education  [] Home visit  []Therapeutic Pass   [] Consults:_______   [] Family Training  [] Other;_______     Patient Goals for Rehab stay:  1.  to increase independence  2. Go home     Rehab Team Goals for patient for the Upcoming Week:  1. increase independence with ADLs    Rehab Team Members in attendance for Team Conference:  :  MARYAM Whatley    Therapy Manager:  Landon Anton, PT, DPT    Nursing Director:  Ozzy Boyce, MSN, RN, [facility]    Social Work:  Aicha Jhaveri, MSW, LISW-S    Nursing:  Grey Garcia, RN  Floyd Lamb RN    Therapy:  Taylor Kirkland, [address] [place] [address], PT, DPT  Cain Berumen, PT, DPT    Garrett Sharma, OTR/L  Carlos Elizalde, OTR/L  Jay Camarena, OTR/L    Nutrition:  Keanu Meredith RD LD    I approve the established interdisciplinary plan of care as documented within the medical record of JELANI Whiting MD.O. M.P.H  PM&R  1/5/2021  10:59 AM

## 2021-01-04 NOTE — PROGRESS NOTES
has a past medical history of Allergic, Anemia, Arthritis, Chronic kidney disease, Depression, Diabetes mellitus ([address] Disorders affecting multiple structures of eye, Epigastric pain, GERD (gastroesophageal reflux disease), Hyperlipidemia, Hypertension, IBS (irritable bowel syndrome), Intermittent atrial fibrillation ([address] Mass of lung, Migraine, Nausea & vomiting, Status post lung surgery, and Thyroid disease. has a past surgical history that includes brain surgery; joint replacement; Cholecystectomy; Bunionectomy; Hysterectomy; thoracotomy (3/10/11); Endoscopy, colon, diagnostic (8/22/2011); Appendectomy; colectomy; Cataract removal (2012); and knee surgery (Left, 10/11/2016). Restrictions  Restrictions/Precautions  Restrictions/Precautions: Weight Bearing  Required Braces or Orthoses?: Yes(L knee immobilizer)  Lower Extremity Weight Bearing Restrictions  Left Lower Extremity Weight Bearing: Foot Flat Weight Bearing(orders say both TTWB and flat foot WBing)  Position Activity Restriction  Other position/activity restrictions: L TTWB and foot flat WB listed, L knee immobilizer on at all times. As of 12/31/2020, pt is allowed to be untetherered from wall in the w/c on the unit  Subjective   General  Chart Reviewed:  Yes Functional Pain Assessment: Prevents or interferes some active activities and ADLs  Non-Pharmaceutical Pain Intervention(s): Ambulation/Increased Activity; Emotional support;Repositioned; Therapeutic presence  Multiple Pain Sites: No  Vital Signs  Patient Currently in Pain: Yes       Orientation  Orientation  Overall Orientation Status: Within Normal Limits  Cognition      Objective   Bed mobility  Rolling to Left: Modified independent(Req additional time)  Rolling to Right: (Declined to roll on R 2/2 to pain)  Supine to Sit: Supervision(Still req VC for energy efficient technique including moving LES into increased flexion)  Sit to Supine: Contact guard assistance(Difficulty clearing  the LLE onto the mat)  Scooting: Modified independent(Req additional time to complete)  Comment: Bed mobility performed on mat table and not in the bed. Transfers  Sit to Stand: Stand by assistance;Supervision(STS from w/c , mat table and commode to a RW with TTWB on the LLE)  Stand to sit: Supervision(VC for hand placement)  Stand Pivot Transfers: Stand by assistance;Supervision(w/c<>mat  leading with both sides)  Comment: Pt cont to improve with ability to maintain WB precautions on LLE .  Pt moves very slowly and with increased apprehesion 2/2 to fear of falling  Ambulation  Ambulation?: Yes(Pt unable to maintain precautions with the LLE when transferring,)  WB Status: LLE with TTWB/ Foot flat WB rstrictions per chart review  More Ambulation?: No  Ambulation 1  Surface: level tile  Device: Rolling Walker  Other Apparatus: Wheelchair follow;Knee Immobilizer(R wrist splint)  Assistance: Stand by assistance  Quality of Gait: Step to gt pattern, maintains heel lifted off the floor on the L  Gait Deviations: Slow Concha;Decreased step length;Decreased step height  Distance: 15' x1, 25' x1  Stairs/Curb  Stairs?: No  Wheelchair Activities  Wheelchair Size: 20\" cassia height w/c  Wheelchair Cushion: Pressure Relieving Level of Assistance for pressure relief activities: Modified independent  Wheelchair Parts Management: Yes  Left Brakes Level of Assistance: Modified independent(VC to locate and fully engage)  Right Brakes Level of Assistance: Modified independent(VC to locate and fully engage)  Propulsion: Yes  Propulsion 1  Propulsion: Manual  Level: Level Tile  Method: RUE;LUE;RLE(Very intermittently will use the R LE in stepping pattern)  Level of Assistance: Modified independent  Distance: 190' x2( req additional time)           Comment: Pt cont to have problems with the [address]. It was all of the way down to her ankle and completely rotated. PT removed the KI and noted that it was wet. PT replaced it with the 2nd KI that was in room while the other dried out to insure skin integrity. PT  continues to provide pt with education on donning the KI most effectively. This req additional time. Skin check performed in which pt has bruising directly behind the knee. PT notified nsg regarding change in status. Stockinette placed on outside of the [address] which appeared effective with minimizing slippage. Since pt is unable to physically burke the KI by herself, the focus is now on pt being able to instruct another person on its application. 12:45: PT arrived to pts room at sched treatment time. Pt was vomiting. PT offered to come back later in the PM. No treatment received  2:35: PT came back again to make up for earlier. Pt sitting in w/c and reported that she still felt as though she had to vomit. \"it stuck right in my throat\" Pt declined PT at this time and requested to just wait until tomorrow. \" Nsg notified.             G-Code     OutComes Score                                                     AM-PAC Score             Goals  Short term goals  Time Frame for Short term goals: 10 -14 days  Short term goal 1: Ind with bed mobility - ongoing  Short term goal 2: MI with transf with  LRAD maintaining WB precautions - ongoing Short term goal 3: Pt able to propel w/c  on level surfaces x 300' at a time - ongoing  Short term goal 4: Pt amb with RW ~20' at a time while maintaining WB precautions as outlined by physician - ongoing  Short term goal 5: Pt to amb up and down 2 steps with AD with WB precautions as outlined by physician with CGA - ongoing  Long term goals  Time Frame for Long term goals : STG= LTG  Patient Goals   Patient goals : Get home ASAP. Also \"not to fall again    Plan    Plan  Times per week: 5-7 x week x 90 minutes  Times per day: Twice a day  Current Treatment Recommendations: Strengthening, Transfer Training, Endurance Training, Patient/Caregiver Education & Training, Wheelchair Mobility Training, Pain Management, Balance Training, Gait Training, Functional Mobility Training, Stair training, Safety Education & Training, Positioning  Safety Devices  Type of devices:  All fall risk precautions in place, Chair alarm in place, Left in chair     Therapy Time   Individual Concurrent Group Co-treatment   Time In 0730         Time Out 0830         Minutes 60          Variance Minutes: 30 ( pt ill )       Yung Cervantes, PT

## 2021-01-04 NOTE — PROGRESS NOTES
Assessment completed, medications given. Fall precautions are in place, call light and bedside table are within reach. Patient c/o pain medicated see MAR.

## 2021-01-04 NOTE — PROGRESS NOTES
NUTRITION ASSESSMENT  Admission Date: 12/23/2020     Type and Reason for Visit: Reassess    NUTRITION RECOMMENDATIONS:   1. PO Diet: Continue current CC5 diet    2. ONS: Provided per family as PRN. Pt to trial Ensure Clear today    NUTRITION ASSESSMENT:  Nutritional status remains at compromise as pt indicating poor po intake r/t vomiting. Pt is able to tolerate ONS provided by family x1/ day. Pt agreeing to have ensure clear to aid with meeting nutritional needs without exacerbating diarrhea symptoms. RD ebony continue to monitor per [facility]. MALNUTRITION ASSESSMENT  Context of Malnutrition: Acute Illness   Malnutrition Status: At risk for malnutrition (Comment)  Findings of the 6 clinical characteristics of malnutrition (Minimum of 2 out of 6 clinical characteristics is required to make the diagnosis of moderate or severe Protein Calorie Malnutrition based on AND/ASPEN Guidelines):  Energy Intake: Less than/equal to 75% of estimated energy requirements    Energy Intake Time: Greater than or equal to 5 days    Weight Loss %: Unable to assess    Weight loss Time: Greater than or equal to 1 year   Due to current CDC guidelines recommending 6-ft distancing for social isolation for COVID19 prevention, physical aspects of the malnutrition assessment were withheld at this time.      NUTRITION DIAGNOSIS   Problem: Problem #1: Inadequate oral intake  Etiology: Decreased ability to consume sufficient energy   Signs & Symptoms: Diet history of poor intake , Intake 0-25% and Intake 26-50%    NUTRITION INTERVENTION  Food and/or Nutrient Delivery:Continue Current diet , Start ONS  or Continue Current ONS   Nutrition education/counseling/coordination of care: Continue Inpatient Monitoring     NUTRITION MONITORING & EVALUATION:  Evaluation:Progress towards goal declining   Goals:Goals: Pt to meet >50% of nutritional requirements from diet and ONS  Monitoring: Meal Intake , Pertinent Labs  or Supplement Intake      OBJECTIVE DATA: · Nutrition-Focused Physical Findings: No edema, LBM 1/4 (loose/ watery), KH=351 mg/dL  · Wounds Surgical Wound      Past Medical History:   Diagnosis Date    Allergic     Anemia     Arthritis     Chronic kidney disease     Depression     Diabetes mellitus (HCC)     Disorders affecting multiple structures of eye     Epigastric pain     GERD (gastroesophageal reflux disease)     Hyperlipidemia     Hypertension     IBS (irritable bowel syndrome)     Intermittent atrial fibrillation ([address] 3/12/2011    Mass of lung     right    Migraine     Nausea & vomiting     Status post lung surgery 3/10/2011    Thyroid disease         ANTHROPOMETRICS  Current Height: 5' 2\" (157.5 cm)  Current Weight: 182 lb (82.6 kg)(per pt)    Admission weight: 182 lb (82.6 kg)(per pt)  Ideal Bodyweight 110 lb/ 50 kg   Usual Bodyweight JOANIE   Adjusted Bodyweight n/a  Weight Changes JOANIE       BMI BMI (Calculated): 33.4    Wt Readings from Last 50 Encounters:   12/23/20 182 lb (82.6 kg)   08/14/17 166 lb (75.3 kg)       COMPARATIVE STANDARDS  Estimated Total Kcals/Day : 15-18 Current Bodyweight (82.6 kg) 4001-9119 kcal    Estimated Total Protein (g/day) : 1.2-1.4 Ideal Bodyweight  (50 kg) 60-70 g/day  Estimated Daily Total Fluid (ml/day): >1500 mL per day     Food / Nutrition-Related History  Pre-Admission / Home Diet:  Pre-Admission/Home Diet: General   Home Supplements / Herbals:    none noted  Food Restrictions / Cultural Requests:    none noted    Current Nutrition Therapies   DIET CARB CONTROL; Carb Control: 5 carb choices (75 gms)/meal  Dietary Nutrition Supplements: Clear Liquid Oral Supplement     PO Intake: 1-25% and 26-50%  PO Supplement: From home   PO Supplement Intake: %  IVF: none    NUTRITION RISK LEVEL: Risk Level:  Moderate     Krzysztof Daley, [address] [place] [address]  Leeroy:  [phone]  Office:  [phone]

## 2021-01-04 NOTE — PROGRESS NOTES
Occupational Therapy  Facility/Department: [facility] ACUTE REHAB UNIT  Daily Treatment Note  NAME: Tye Burk  : 1945  MRN: [id number]    Date of Service: 2021    Discharge Recommendations:  Continue to assess pending progress, 24 hour supervision or assist, Home with Home health OT  OT Equipment Recommendations  ADL Assistive Devices: Shower Chair with back  Other: continue to assess pending progress    Assessment   Performance deficits / Impairments: Decreased functional mobility ; Decreased endurance;Decreased ADL status; Decreased balance;Decreased strength;Decreased high-level IADLs;Decreased fine motor control  Assessment: Pt with low BP, put on 1.5L O2 per RN and actively particpated sponge bath/dressing both EOB and supine all completed at [facility]. Pt continues to benefit from intensive inpt therapy to maximize functional independence. Continue with POC  Treatment Diagnosis: decreased ADL status, functional transfers and functional mobility 2/2 L distal femur fx  Prognosis: Good  OT Education: OT Role;ADL Adaptive Strategies  Patient Education: pt verb understanding  REQUIRES OT FOLLOW UP: Yes  Activity Tolerance  Activity Tolerance: Patient Tolerated treatment well  Activity Tolerance: pt limited to sponge bathing this date per RN due to drop in BP, RN put pt on 1.5 L O2  Safety Devices  Safety Devices in place: Yes  Type of devices: All fall risk precautions in place; Bed alarm in place; Left in bed;Call light within reach;Nurse notified         Patient Diagnosis(es): There were no encounter diagnoses. has a past medical history of Allergic, Anemia, Arthritis, Chronic kidney disease, Depression, Diabetes mellitus ([address] Disorders affecting multiple structures of eye, Epigastric pain, GERD (gastroesophageal reflux disease), Hyperlipidemia, Hypertension, IBS (irritable bowel syndrome), Intermittent atrial fibrillation ([address] Mass of lung, Migraine, Nausea & vomiting, Status post lung surgery, and Thyroid disease. has a past surgical history that includes brain surgery; joint replacement; Cholecystectomy; Bunionectomy; Hysterectomy; thoracotomy (3/10/11); Endoscopy, colon, diagnostic (8/22/2011); Appendectomy; colectomy; Cataract removal (2012); and knee surgery (Left, 10/11/2016). Restrictions  Restrictions/Precautions  Restrictions/Precautions: Weight Bearing  Required Braces or Orthoses?: Yes(L knee immobilizer)  Lower Extremity Weight Bearing Restrictions  Left Lower Extremity Weight Bearing: Foot Flat Weight Bearing(orders say both TTWB and flat foot WBing)  Position Activity Restriction  Other position/activity restrictions: L TTWB and foot flat WB listed, L knee immobilizer on at all times. As of 12/31/2020, pt is allowed to be untetherered from wall in the w/c on the unit  Subjective   General  Chart Reviewed: Yes  Patient assessed for rehabilitation services?: Yes  Additional Pertinent Hx: 65 y/o F with mechanical fall at home in bathroom. Imaging revealed left distal femur fracture and is now TTWB on LLE. PMH significant for chronic back pain, bilateral total knee arthroplasties, & Diabetes  Response to previous treatment: Patient with no complaints from previous session  Family / Caregiver Present: No  Referring Practitioner: Dr Killian Lara  Diagnosis: L distal femur fracture  Subjective  Subjective: Pt supine in bed upon entry, agreeable to therapy session (requesting to shower, per RN pt supine or EOB). Pt agreeable to sponge bath.   General Comment Comments: Pt supine to sit and sit to supine Supervision multiple times for ADLs bathing/dressing and including use of lotion/powder. Pt completed all at Supervision/set with extended time to complete, seated EOB Supervision. Pt rolling left /right Mod I to pull up pants/brief. (Note: due to KI LLE not bathed)  At EOS pt supine in bed with HOB raised and luncher served, bed alarm on and call light in reach. Pain Assessment  Pain Level: 8  Pain Type: Chronic pain  Pain Location: Back  Pain Descriptors: Aching  Pre Treatment Pain Screening  Intervention List: Patient able to continue with treatment;Nurse/Physician notified  Vital Signs  Patient Currently in Pain: Yes   Orientation  Orientation  Overall Orientation Status: Within Normal Limits  Objective    ADL  Equipment Provided: Reacher  UE Bathing: Supervision;Setup  LE Bathing: Supervision;Setup  UE Dressing: Supervision;Setup(vcs to snap bra then don over head)  LE Dressing: Supervision;Setup        Balance  Sitting Balance: Supervision(during ADLs)  Bed mobility  Supine to Sit: Supervision  Sit to Supine: Supervision  Scooting: Supervision                          Cognition  Overall Cognitive Status: WFL          Second Session: Pt c/o nausea and actively producing emesis while semi-supine in bed upon arrival. Due to medical complications, pt unable to participate in therapy session this afternoon. Will attempt to make up minutes tomorrow as able.     Plan   Plan  Times per week: 90 minutes per day 5 days/week  Times per day: Daily  Current Treatment Recommendations: Strengthening, Patient/Caregiver Education & Training, Home Management Training, Equipment Evaluation, Education, & procurement, Balance Training, Functional Mobility Training, Endurance Training, Safety Education & Training, Self-Care / ADL, Wheelchair Mobility Training  G-Code     OutComes Score                                                  AM-PAC Score             Goals  Short term goals Time Frame for Short term goals: STG=LTG  Long term goals  Time Frame for Long term goals : 2 weeks-all ongoing  Long term goal 1: Pt will complete LB dressing with use of AE prn with spvn (not met)  Long term goal 2: Pt will complete UB dressing with mod I (not met)  Long term goal 3: Pt will complete bathing with spvn (not met)  Long term goal 4: Pt will complete toilet transfer with spvn maintaining weightbearing restrictions (not met)  Long term goal 5: Pt will complete toileting with spvn (not met)  Patient Goals   Patient goals :  To be more independent       Therapy Time   Individual Concurrent Group Co-treatment   Time In 1100         Time Out 1200         Minutes 60         Timed Code Treatment Minutes: 60 Minutes     Second Session Therapy Time: Minute variance 30 minutes (pt vomiting, unable to participate in therapy)    Timed Code Treatment Minutes:  60  + 0    Total Treatment Minutes:  [address], LOBATO/L [id number]  [facility], OTR/L (attempted second session)

## 2021-01-04 NOTE — PROGRESS NOTES
Department of Physical Medicine & Rehabilitation  Progress Note    Patient Identification:  Rita Garcia  [id number]  : 1945  Admit date: 2020    Chief Complaint: L hip fracture     Subjective:   Patient seen at bedside this morning. Reports doing well and less lethargic. Patient had RR called for SBP in 80s overnight , reported feeling better after receiving fluids. ROS: No f/c, n/v, cp     Objective:  Patient Vitals for the past 24 hrs:   BP Temp Temp src Pulse Resp SpO2   21 0730 (!) 148/70 97.4 °F (36.3 °C) Oral 77 17 91 %   21 1952 (!) 148/83 97.2 °F (36.2 °C) Oral 64 17 96 %   21 1600 95/60        21 1557 (!) 96/53   57     21 1530 107/64        21 1515 (!) 107/59   59     21 1454 105/70   56     21 1452 (!) 108/59   58     21 1448 99/64   58     21 1445 111/72   63     21 1442 (!) 91/59   58     21 1439 (!) 95/58   57     21 1436 96/65   57     21 1434 95/61   56     21 1433 (!) 94/54   57     21 1430 124/67   57     21 1428 98/60   60     21 1427 (!) 98/58   59     21 1424 (!) 103/59   60     21 1422 (!) 92/59   58     21 1420 (!) 91/56   59     21 1418 95/60   57     21 1416 92/61   60     21 1414 (!) 90/49   61     21 1410 (!) 89/53   60     21 1405 95/60        21 1403 103/65        21 1401 (!) 83/46   61     21 1347 98/63   59     21 1343 (!) 83/49 97.9 °F (36.6 °C) Oral 60 18 94 %     Const: Alert. No distress, pleasant. HEENT: Normocephalic, atraumatic. Normal sclera/conjunctiva. MMM. CV: Regular rate and rhythm. Resp: No respiratory distress. Lungs CTAB. Abd: Soft, nontender, nondistended, NABS+   Ext: + edema bilaterally L>R  Neuro: Alert, oriented, appropriately interactive. Psych: Cooperative, appropriate mood and affect    Laboratory data: Available via EMR.    Last 24 hour lab  Recent Results (from the past 24 hour(s))   POCT Glucose    Collection Time: 01/03/21 11:41 AM   Result Value Ref Range    POC Glucose 193 (H) 70 - 99 mg/dl    Performed on ACCU-CHEK    POCT Glucose    Collection Time: 01/03/21  1:41 PM   Result Value Ref Range    POC Glucose 238 (H) 70 - 99 mg/dl    Performed on ACCU-CHEK    CBC    Collection Time: 01/03/21  3:02 PM   Result Value Ref Range    WBC 9.9 4.0 - 11.0 K/uL    RBC 2.67 (L) 4.00 - 5.20 M/uL    Hemoglobin 8.0 (L) 12.0 - 16.0 g/dL    Hematocrit 24.4 (L) 36.0 - 48.0 %    MCV 91.7 80.0 - 100.0 fL    MCH 30.2 26.0 - 34.0 pg    MCHC 32.9 31.0 - 36.0 g/dL    RDW 17.3 (H) 12.4 - 15.4 %    Platelets 531 714 - 629 K/uL    MPV 8.3 5.0 - 10.5 fL   Basic metabolic panel    Collection Time: 01/03/21  3:02 PM   Result Value Ref Range    Sodium 128 (L) 136 - 145 mmol/L    Potassium 6.0 (HH) 3.5 - 5.1 mmol/L    Chloride 96 (L) 99 - 110 mmol/L    CO2 21 21 - 32 mmol/L    Anion Gap 11 3 - 16    Glucose 214 (H) 70 - 99 mg/dL    BUN 28 (H) 7 - 20 mg/dL    CREATININE 1.1 0.6 - 1.2 mg/dL    GFR Non- 48 (A) >60    GFR  58 (A) >60    Calcium 8.7 8.3 - 10.6 mg/dL   Troponin    Collection Time: 01/03/21  3:02 PM   Result Value Ref Range    Troponin 0.02 (H) <0.01 ng/mL   EKG 12 Lead    Collection Time: 01/03/21  3:02 PM   Result Value Ref Range    Ventricular Rate 59 BPM    Atrial Rate 59 BPM    P-R Interval 152 ms    QRS Duration 122 ms    Q-T Interval 528 ms    QTc Calculation (Bazett) 522 ms    P Axis -17 degrees    R Axis 78 degrees    T Axis 4 degrees    Diagnosis       Sinus bradycardiaRight bundle branch blockMarked T wave abnormality, consider anterior ischemiaProlonged QTAbnormal ECGConfirmed by MANUEL FLYNN, Amy Alvarado ([id number]) on 1/4/2021 7:02:17 AM   Renal function panel    Collection Time: 01/03/21  5:11 PM   Result Value Ref Range Sodium 130 (L) 136 - 145 mmol/L    Potassium 5.7 (H) 3.5 - 5.1 mmol/L    Chloride 98 (L) 99 - 110 mmol/L    CO2 21 21 - 32 mmol/L    Anion Gap 11 3 - 16    Glucose 166 (H) 70 - 99 mg/dL    BUN 28 (H) 7 - 20 mg/dL    CREATININE 1.1 0.6 - 1.2 mg/dL    GFR Non- 48 (A) >60    GFR  58 (A) >60    Calcium 8.7 8.3 - 10.6 mg/dL    Phosphorus 3.0 2.5 - 4.9 mg/dL    Alb 3.5 3.4 - 5.0 g/dL   POCT Glucose    Collection Time: 01/03/21  5:24 PM   Result Value Ref Range    POC Glucose 177 (H) 70 - 99 mg/dl    Performed on ACCU-CHEK    POCT Glucose    Collection Time: 01/03/21  7:55 PM   Result Value Ref Range    POC Glucose 147 (H) 70 - 99 mg/dl    Performed on ACCU-CHEK    Basic Metabolic Panel w/ Reflex to MG    Collection Time: 01/04/21  6:01 AM   Result Value Ref Range    Sodium 133 (L) 136 - 145 mmol/L    Potassium reflex Magnesium 4.3 3.5 - 5.1 mmol/L    Chloride 100 99 - 110 mmol/L    CO2 23 21 - 32 mmol/L    Anion Gap 10 3 - 16    Glucose 129 (H) 70 - 99 mg/dL    BUN 24 (H) 7 - 20 mg/dL    CREATININE 1.1 0.6 - 1.2 mg/dL    GFR Non- 48 (A) >60    GFR  58 (A) >60    Calcium 9.1 8.3 - 10.6 mg/dL   CBC auto differential    Collection Time: 01/04/21  6:01 AM   Result Value Ref Range    WBC 9.0 4.0 - 11.0 K/uL    RBC 2.83 (L) 4.00 - 5.20 M/uL    Hemoglobin 8.7 (L) 12.0 - 16.0 g/dL    Hematocrit 26.0 (L) 36.0 - 48.0 %    MCV 91.9 80.0 - 100.0 fL    MCH 30.9 26.0 - 34.0 pg    MCHC 33.6 31.0 - 36.0 g/dL    RDW 17.7 (H) 12.4 - 15.4 %    Platelets 236 350 - 107 K/uL    MPV 8.4 5.0 - 10.5 fL    Neutrophils % 53.1 %    Lymphocytes % 28.1 %    Monocytes % 12.9 %    Eosinophils % 4.8 %    Basophils % 1.1 %    Neutrophils Absolute 4.8 1.7 - 7.7 K/uL    Lymphocytes Absolute 2.5 1.0 - 5.1 K/uL    Monocytes Absolute 1.2 0.0 - 1.3 K/uL    Eosinophils Absolute 0.4 0.0 - 0.6 K/uL    Basophils Absolute 0.1 0.0 - 0.2 K/uL       Therapy progress:  PT  Position Activity Restriction Other position/activity restrictions: L TTWB and foot flat WB listed, L knee immobilizer on at all times. As of 2020, pt is allowed to be untetherered from wall in the w/c on the unit  Objective     Sit to Stand: Stand by assistance(STS from w/c , mat table and commode to a RW with TTWB on the LLE)  Stand to sit: Supervision(VC for hand placement)  Device: Rolling Walker  Other Apparatus: Wheelchair follow, Knee Immobilizer(R wrist splint)  Assistance: Contact guard assistance, Stand by assistance  Distance: 15' x2 BS chair<> commode, 30' x1  OT  PT Equipment Recommendations  Other: Ongoing assessment at this time  Toilet - Technique: Stand pivot  Equipment Used: Raised toilet seat with rails  Toilet Transfers Comments: use of GB; VCs to maintain WBing precautions  Assessment        SLP          Body mass index is 33.29 kg/m². Assessment and Plan:   Ms. Jose Maunel Singer will require ongoing medical management and daily physician oversight for the followin. Left femur fracture. - s/p ORIF 2020. Patient will proceed with treatment per orthopedic protocol.   - PT/OT required      2. Diabetes. - home  Metformin started. D/C insulin     3. Coagulopathy. (heterozygous factor V Leiden deficiency). - continue Eliquis 2.5 mg by mouth twice a day per hematology recommendations.     4. Coronary artery disease.   - continue Clopidogrel      5. UTI. Patient's preprocedure urinalysis is consistent with urinary tract infection, greater than 100,000 CFU Klebsiella pneumonia. - Continue Rocephin 1 g IV   - resolved    6. Normocytic anemia. Hemoglobin on admission was 11.8.   - Hgb on admission 11.8, Hgb 8.8 yesterday.  Continue to monitor.     7. Multi-level Degenerative Disc Disease - Lumbar MRI on 12/17/20 with L4-5 circumferential disc bulge with a right synovial facet cyst in the posterior right epidural space resulting in severe stenosis of the right subarticular zone/lateral recess, with mass effect on the descending right nerve roots  - Ortho consulted and recs appreciated  - will start Prednisone taper    Rehab Progress: Improving  Anticipated Dispo: home  Services/DME: REFUGIO  ELOS: REFUGIO Freire MD PGY-1    Melquiades Ellison D.O. M.P.H  PM&R  1/4/2021  8:15 AM

## 2021-01-05 LAB
GLUCOSE BLD-MCNC: 146 MG/DL (ref 70–99)
GLUCOSE BLD-MCNC: 150 MG/DL (ref 70–99)
GLUCOSE BLD-MCNC: 187 MG/DL (ref 70–99)
GLUCOSE BLD-MCNC: 224 MG/DL (ref 70–99)
PERFORMED ON: ABNORMAL

## 2021-01-05 PROCEDURE — 97116 GAIT TRAINING THERAPY: CPT | Performed by: PHYSICAL THERAPIST

## 2021-01-05 PROCEDURE — 6370000000 HC RX 637 (ALT 250 FOR IP): Performed by: PHYSICAL MEDICINE & REHABILITATION

## 2021-01-05 PROCEDURE — 6370000000 HC RX 637 (ALT 250 FOR IP): Performed by: STUDENT IN AN ORGANIZED HEALTH CARE EDUCATION/TRAINING PROGRAM

## 2021-01-05 PROCEDURE — 6370000000 HC RX 637 (ALT 250 FOR IP): Performed by: PHYSICIAN ASSISTANT

## 2021-01-05 PROCEDURE — 97535 SELF CARE MNGMENT TRAINING: CPT

## 2021-01-05 PROCEDURE — 97530 THERAPEUTIC ACTIVITIES: CPT | Performed by: PHYSICAL THERAPIST

## 2021-01-05 PROCEDURE — 97530 THERAPEUTIC ACTIVITIES: CPT

## 2021-01-05 PROCEDURE — 97110 THERAPEUTIC EXERCISES: CPT

## 2021-01-05 PROCEDURE — 1280000000 HC REHAB R&B

## 2021-01-05 RX ADMIN — METFORMIN HYDROCHLORIDE 500 MG: 500 TABLET ORAL at 10:47

## 2021-01-05 RX ADMIN — APIXABAN 2.5 MG: 5 TABLET, FILM COATED ORAL at 21:51

## 2021-01-05 RX ADMIN — GABAPENTIN 400 MG: 400 CAPSULE ORAL at 21:51

## 2021-01-05 RX ADMIN — PREDNISONE 15 MG: 10 TABLET ORAL at 09:34

## 2021-01-05 RX ADMIN — ALPRAZOLAM 0.5 MG: 0.5 TABLET ORAL at 23:29

## 2021-01-05 RX ADMIN — SERTRALINE HYDROCHLORIDE 100 MG: 100 TABLET ORAL at 09:34

## 2021-01-05 RX ADMIN — INSULIN LISPRO 2 UNITS: 100 INJECTION, SOLUTION INTRAVENOUS; SUBCUTANEOUS at 12:22

## 2021-01-05 RX ADMIN — TIZANIDINE 4 MG: 4 TABLET ORAL at 17:39

## 2021-01-05 RX ADMIN — DIPHENOXYLATE HYDROCHLORIDE AND ATROPINE SULFATE 1 TABLET: 2.5; .025 TABLET ORAL at 21:51

## 2021-01-05 RX ADMIN — INSULIN LISPRO 1 UNITS: 100 INJECTION, SOLUTION INTRAVENOUS; SUBCUTANEOUS at 21:55

## 2021-01-05 RX ADMIN — DIPHENOXYLATE HYDROCHLORIDE AND ATROPINE SULFATE 1 TABLET: 2.5; .025 TABLET ORAL at 09:34

## 2021-01-05 RX ADMIN — DICLOFENAC 2 G: 10 GEL TOPICAL at 21:55

## 2021-01-05 RX ADMIN — DICLOFENAC 2 G: 10 GEL TOPICAL at 10:47

## 2021-01-05 RX ADMIN — HYDROCODONE BITARTRATE AND ACETAMINOPHEN 2 TABLET: 5; 325 TABLET ORAL at 21:51

## 2021-01-05 RX ADMIN — TIZANIDINE 4 MG: 4 TABLET ORAL at 09:33

## 2021-01-05 RX ADMIN — APIXABAN 2.5 MG: 5 TABLET, FILM COATED ORAL at 09:34

## 2021-01-05 RX ADMIN — LEVOTHYROXINE SODIUM 125 MCG: 0.03 TABLET ORAL at 05:54

## 2021-01-05 RX ADMIN — PANTOPRAZOLE SODIUM 40 MG: 40 TABLET, DELAYED RELEASE ORAL at 05:54

## 2021-01-05 RX ADMIN — PRAVASTATIN SODIUM 10 MG: 10 TABLET ORAL at 21:52

## 2021-01-05 RX ADMIN — GABAPENTIN 400 MG: 400 CAPSULE ORAL at 09:34

## 2021-01-05 RX ADMIN — METFORMIN HYDROCHLORIDE 1000 MG: 1000 TABLET ORAL at 21:51

## 2021-01-05 RX ADMIN — INSULIN LISPRO 4 UNITS: 100 INJECTION, SOLUTION INTRAVENOUS; SUBCUTANEOUS at 17:36

## 2021-01-05 RX ADMIN — INSULIN LISPRO 2 UNITS: 100 INJECTION, SOLUTION INTRAVENOUS; SUBCUTANEOUS at 09:37

## 2021-01-05 RX ADMIN — TRAMADOL HYDROCHLORIDE 50 MG: 50 TABLET, COATED ORAL at 15:47

## 2021-01-05 RX ADMIN — ALPRAZOLAM 0.5 MG: 0.5 TABLET ORAL at 09:33

## 2021-01-05 RX ADMIN — METOPROLOL SUCCINATE 50 MG: 50 TABLET, EXTENDED RELEASE ORAL at 09:34

## 2021-01-05 RX ADMIN — HYDROCHLOROTHIAZIDE 25 MG: 25 TABLET ORAL at 09:33

## 2021-01-05 RX ADMIN — CLOPIDOGREL BISULFATE 75 MG: 75 TABLET ORAL at 09:33

## 2021-01-05 RX ADMIN — GABAPENTIN 400 MG: 400 CAPSULE ORAL at 13:06

## 2021-01-05 RX ADMIN — HYDROCODONE BITARTRATE AND ACETAMINOPHEN 2 TABLET: 5; 325 TABLET ORAL at 05:54

## 2021-01-05 RX ADMIN — HYDROCODONE BITARTRATE AND ACETAMINOPHEN 2 TABLET: 5; 325 TABLET ORAL at 13:05

## 2021-01-05 ASSESSMENT — PAIN - FUNCTIONAL ASSESSMENT
PAIN_FUNCTIONAL_ASSESSMENT: PREVENTS OR INTERFERES SOME ACTIVE ACTIVITIES AND ADLS

## 2021-01-05 ASSESSMENT — PAIN DESCRIPTION - FREQUENCY
FREQUENCY: INTERMITTENT

## 2021-01-05 ASSESSMENT — PAIN DESCRIPTION - LOCATION
LOCATION: BACK;LEG
LOCATION: BACK
LOCATION: BACK;HIP

## 2021-01-05 ASSESSMENT — PAIN SCALES - GENERAL
PAINLEVEL_OUTOF10: 8
PAINLEVEL_OUTOF10: 7
PAINLEVEL_OUTOF10: 6
PAINLEVEL_OUTOF10: 8
PAINLEVEL_OUTOF10: 6
PAINLEVEL_OUTOF10: 0

## 2021-01-05 ASSESSMENT — PAIN DESCRIPTION - ORIENTATION: ORIENTATION: LEFT

## 2021-01-05 ASSESSMENT — PAIN DESCRIPTION - ONSET
ONSET: GRADUAL
ONSET: ON-GOING
ONSET: ON-GOING

## 2021-01-05 ASSESSMENT — PAIN DESCRIPTION - PAIN TYPE
TYPE: SURGICAL PAIN;CHRONIC PAIN
TYPE: ACUTE PAIN;CHRONIC PAIN

## 2021-01-05 ASSESSMENT — PAIN DESCRIPTION - DESCRIPTORS: DESCRIPTORS: ACHING

## 2021-01-05 ASSESSMENT — PAIN DESCRIPTION - PROGRESSION
CLINICAL_PROGRESSION: GRADUALLY IMPROVING
CLINICAL_PROGRESSION: NOT CHANGED

## 2021-01-05 NOTE — PROGRESS NOTES
Physical Therapy  Facility/Department: [facility] ACUTE REHAB UNIT  Daily Treatment Note  NAME: Magaly Sims  : 1945  MRN: [id number]    Date of Service: 2021    Discharge Recommendations:  Home with assist PRN, Patient would benefit from continued therapy after discharge, Home with Home health PT   PT Equipment Recommendations  Other: Ongoing assessment at this time    Assessment   Body structures, Functions, Activity limitations: Decreased functional mobility ; Decreased ADL status; Decreased endurance;Decreased strength;Decreased balance; Increased pain;Decreased ROM  Assessment: Pain continues to be the most limiting factor with pt's progress. Pt moves slowly and very guarded for this reason. Pt cont to express her frustration with pain but also acknoledges that she has made marked improvement. Pt is functioning below baseline level and will continue to benefit from skilled physical therapy services to address limitations and maximize potential towards ind and safer d/c home. Treatment Diagnosis: Decreased functional mobility 2/2 to distal femur fx  Prognosis: Good  Decision Making: Medium Complexity  Barriers to Learning: None  REQUIRES PT FOLLOW UP: Yes  Activity Tolerance  Activity Tolerance: Patient limited by fatigue;Patient limited by pain; Patient limited by endurance     Patient Diagnosis(es): There were no encounter diagnoses. has a past medical history of Allergic, Anemia, Arthritis, Chronic kidney disease, Depression, Diabetes mellitus ([address] Disorders affecting multiple structures of eye, Epigastric pain, GERD (gastroesophageal reflux disease), Hyperlipidemia, Hypertension, IBS (irritable bowel syndrome), Intermittent atrial fibrillation ([address] Mass of lung, Migraine, Nausea & vomiting, Status post lung surgery, and Thyroid disease. has a past surgical history that includes brain surgery; joint replacement; Cholecystectomy; Bunionectomy; Hysterectomy; thoracotomy (3/10/11); Endoscopy, colon, diagnostic (8/22/2011); Appendectomy; colectomy; Cataract removal (2012); and knee surgery (Left, 10/11/2016). Restrictions  Restrictions/Precautions  Restrictions/Precautions: Weight Bearing  Required Braces or Orthoses?: Yes(L knee immobilizer)  Lower Extremity Weight Bearing Restrictions  Left Lower Extremity Weight Bearing: Foot Flat Weight Bearing(orders say both TTWB and flat foot WBing)  Position Activity Restriction  Other position/activity restrictions: L TTWB and foot flat WB listed, L knee immobilizer on at all times. As of 12/31/2020, pt is allowed to be untetherered from wall in the w/c on the unit  Subjective   General  Chart Reviewed:  Yes Additional Pertinent Hx: 65 YO F who fell in her bathroom when her right leg gave out causing her to fall onto her left side. Resulted in immediate pain in left leg and inability to ambulate. Brought to [facility] ED by EMS. Imaging revealed left distal femur fracture. PMH significant for chronic back pain, bilateral total knee arthroplasties, & Diabetes. She has chronic right leg pain due to documented L4-5 lumbar stenosis. She is followed by Dr. Soto Contreras for iron deficiency anemia. She has a history of heterozygous factor V Leiden discovered at time of PE in 2015, was fully anticoagulated for 1 yr, then placed on ASA, but had reaction. She was NOT on any anticoagulation prior to admission. Received a dose or 2 of heparin pre-op. ORIF of the left femur was completed 12/20/2020. Post-op complications include UTI, anemia, and shooting/burning pain involving her right calf. Patient believes this pain is associated with her neuropathic pain from her lumbar stenosis. MD ordered doppler since patient has a hx of DVT. She was due for B12 injection in office next week but hematology decided to administer B 12 on 12/23/20 prior to transfer to ARU. Family / Caregiver Present: No  Referring Practitioner: Dr. Roseanna Rai  Subjective  Subjective: Pt reports that she is feeling better on this date. Pt asked if she would need to make arrangements for special transportation since her brace does not allow any bending. ( PT instructed pt with car transf for this reason)  General Comment  Comments: Pt sitting in  wheelchair when PT arrived. Pt agreeable to therapy.   Pain Screening  Patient Currently in Pain: Yes  Pain Assessment  Pain Level: (Did not rate)  Patient's Stated Pain Goal: No pain  Pain Location: Back  Pain Orientation: Left  Pain Descriptors: Aching  Pain Frequency: Intermittent  Pain Onset: On-going  Clinical Progression: Not changed  Functional Pain Assessment: Prevents or interferes some active activities and ADLs Quality of Gait: Step to gt pattern, maintains heel lifted off the floor on the L  Gait Deviations: Slow Concha;Decreased step length;Decreased step height  Distance: 35', 30' 24'  Pt positioned back in w/c with chair alarm reactivated. ( pt is untethered from the wall in w/c on unit )   G-Code     OutComes Score                                                     AM-PAC Score             Goals  Short term goals  Time Frame for Short term goals: 10 -14 days  Short term goal 1: Ind with bed mobility - ongoing  Short term goal 2: MI with transf with  LRAD maintaining WB precautions - ongoing  Short term goal 3: Pt able to propel w/c  on level surfaces x 300' at a time - ongoing  Short term goal 4: Pt amb with RW ~20' at a time while maintaining WB precautions as outlined by physician - ongoing  Short term goal 5: Pt to amb up and down 2 steps with AD with WB precautions as outlined by physician with CGA - ongoing  Long term goals  Time Frame for Long term goals : STG= LTG  Patient Goals   Patient goals : Get home ASAP. Also \"not to fall again    Plan    Plan  Times per week: 5-7 x week x 90 minutes  Times per day: Twice a day  Current Treatment Recommendations: Strengthening, Transfer Training, Endurance Training, Patient/Caregiver Education & Training, Wheelchair Mobility Training, Pain Management, Balance Training, Gait Training, Functional Mobility Training, Stair training, Safety Education & Training, Positioning  Safety Devices  Type of devices:  All fall risk precautions in place, Chair alarm in place, Left in chair     Therapy Time   Individual Concurrent Group Co-treatment   Time In 0730         Time Out 0830         Minutes 60           Second Session Therapy Time:   Individual Concurrent Group Co-treatment   Time In 0930         Time Out 1000         Minutes 30           Timed Code Treatment Minutes:  60+30    Total Treatment Minutes:  80       Katherine Maria, PT

## 2021-01-05 NOTE — DISCHARGE INSTR - COC
Continuity of Care Form    Patient Name: Freda Mckinley   :  1945  MRN:  [id number]    Admit date:  2020  Discharge date:  21    Code Status Order: Full Code   Advance Directives:   [facility] Documentation       Date/Time Healthcare Directive Type of Healthcare Directive Copy in [address]  [address] Agent's Name Healthcare Agent's Phone Number    [phone]  Yes, patient has an advance directive for healthcare treatment  Durable power of  for health care;Living will   [place]            Admitting Physician:  Jessica Yee DO  PCP: Milagros Phalen, MD    Discharging Nurse: [facility] Unit/Room#: 1194/6479-57  Discharging Unit Phone Number: [phone]    Emergency Contact:   Extended Emergency Contact Information  Primary Emergency Contact: Magdy Cristinaelizabeth [address] Phone: [phone]  Relation: Child  Secondary Emergency Contact: Trina Obrien [address] Phone: [phone]  Relation: Child    Past Surgical History:  Past Surgical History:   Procedure Laterality Date    APPENDECTOMY      BRAIN SURGERY      BUNIONECTOMY      CATARACT REMOVAL  2012    CHOLECYSTECTOMY      COLECTOMY      ENDOSCOPY, COLON, DIAGNOSTIC  2011    HYSTERECTOMY      JOINT REPLACEMENT      KNEE SURGERY Left 10/11/2016    THORACOTOMY  3/10/11    right thoracotomy;right video assissted thoroscopy biopsey of mediastinal mass for ffrozen  right lower lobe wedge resection for frozen; thymusectomy       Immunization History:   Immunization History   Administered Date(s) Administered    Influenza Vaccine, unspecified formulation 2015, 10/03/2016    Influenza Virus Vaccine 10/26/2014    Pneumococcal Polysaccharide ([id number]) 10/26/2012       Active Problems:  Patient Active Problem List   Diagnosis Code    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    Nodule of right lung R91.1    Diabetes mellitus ([facility] E11.9  HTN (hypertension) I10    GERD (gastroesophageal reflux disease) K21.9    Arthritis M19.90    Hypothyroidism E03.9    Anxiety disorder F41.9    Gastroesophageal reflux disease with hiatal hernia K21.9, K44.9    Status post lung surgery Z98.890    Intermittent atrial fibrillation (HCC) I48.0    B12 deficiency E53.8    Iron deficiency anemia due to dietary causes D50.8    Unspecified adverse effect of other drug, medicinal and biological substance(995.29) T50.995A    Factor V Leiden (HCC) D68.51    Pulmonary embolism (HCC) I26.99    Deep vein thrombosis (DVT) (HCC) I82.409    Treatment Z51.89    Iron deficiency E61.1    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    Closed fracture of distal end of left femur, initial encounter ([facility] S72.402A       Isolation/Infection:   Isolation            No Isolation          Patient Infection Status       None to display            Nurse Assessment:  Last Vital Signs: /67   Pulse 65   Temp 98.2 °F (36.8 °C) (Axillary)   Resp 18   Ht 5' 2\" (1.575 m)   Wt 194 lb 14.2 oz (88.4 kg)   SpO2 95%   BMI 35.65 kg/m²     Last documented pain score (0-10 scale): Pain Level: 6  Last Weight:   Wt Readings from Last 1 Encounters:   01/05/21 194 lb 14.2 oz (88.4 kg)     Mental Status:  oriented and alert    IV Access:  - None    Nursing Mobility/ADLs:  Walking   Assisted  Transfer  Assisted  Bathing  Assisted  Dressing  Assisted  Toileting  Assisted  Feeding  Independent  Med Admin  Assisted  Med Delivery   whole    Wound Care Documentation and Therapy:  Incision 03/10/11 Chest Right (Active)   Number of days: 3589        Elimination:  Continence:   · Bowel: Yes  · Bladder: Yes  Urinary Catheter: None   Colostomy/Ileostomy/Ileal Conduit: No       Date of Last BM: 1/8/21  No intake or output data in the 24 hours ending 01/05/21 1618  No intake/output data recorded.     Safety Concerns:     History of Falls (last 30 days) and At Risk for Falls    Impairments/Disabilities:      None Nutrition Therapy:  Current Nutrition Therapy:   - Oral Diet:  Carb Control 5 carbs/meal (2000kcals/day)    Routes of Feeding: Oral  Liquids: Thin Liquids  Daily Fluid Restriction: no  Last Modified Barium Swallow with Video (Video Swallowing Test): not done    Treatments at the Time of Hospital Discharge:   Respiratory Treatments:   Oxygen Therapy:  is not on home oxygen therapy. Ventilator:    - No ventilator support    Rehab Therapies: Physical Therapy and Occupational Therapy  HOME HEALTH CARE: LEVEL 4 SICK         -Initial home health evaluation to occur within 24-48 hours, in patient home   -Home health agency to establish plan of care for patient over 60 day period   -Medication Reconciliation   -PT/OT evaluations in home within 24-48 hours of discharge; including -DME and home safety   -Frontload therapy 5 days, then 3x a week   -OT to evaluate if patient has [address] needs for personal care   -Frontload nursing visits daily, then qod with progression as warranted; establish plan for 60-day period   -Nursing to perform telephone visit on the days that a visit is not being made in person for the first 30 days   -Palliative Care referral   -Dietician evaluation within five days of discharge   -PCP visit within three days of discharge, followed by visit at 10 days, and 21 days   - evaluation within 24-48 hours, includes evaluation of resources and insurance to determine [place], [place], LTC, and Medicaid options   - Recommend patient for [address] [place] [address] for all Cardiac concerns including weight      concerns, BP, and medication.  - [phone]  Weight Bearing Status/Restrictions: Non-weight bearing on left leg  Other Medical Equipment (for information only, NOT a DME order):  wheelchair  Other Treatments:     Patient's personal belongings (please select all that are sent with patient):  Dentures upper and lower RN SIGNATURE:  Electronically signed by Bernabe Horne RN on 1/8/21 at 10:56 AM EST    CASE MANAGEMENT/SOCIAL WORK SECTION    Inpatient Status Date:     Readmission Risk Assessment Score:  Readmission Risk              Risk of Unplanned Readmission:        18           Discharging to Facility/ Agency   · Name: [facility]  · Phone:[phone]  · Fax:[phone]        / signature: Electronically signed by BERYL Dunlap on 1/5/21 at 4:19 PM EST    PHYSICIAN SECTION    Prognosis: Good    Condition at Discharge: Stable    Rehab Potential (if transferring to Rehab): Fair    Recommended Labs or Other Treatments After Discharge: PT/OT    Physician Certification: I certify the above information and transfer of Jhonatan Murray  is necessary for the continuing treatment of the diagnosis listed and that she requires Home Care for greater 30 days.      Update Admission H&P: No change in H&P    PHYSICIAN SIGNATURE:  Electronically signed by Edith Arevalo DO on 1/8/21 at 9:00 AM EST

## 2021-01-05 NOTE — PLAN OF CARE
Problem: Falls - Risk of:  Goal: Will remain free from falls  Description: Will remain free from falls  Outcome: Ongoing  Note: Pt is A&Ox4, is high fall risk, Pt educated and encouraged to use call light to notify and wait for assistance from staff before getting OOB, pt uses call light appropriately, has made no unsafe movements, no attempts to get OOB without assistance. Pt's bed alarm remained on throughout shift, bed in lowest position, 2/4 side rails up, call light and bedside table within reach. No falls so far this shift, will continue to monitor. Problem: Skin Integrity:  Goal: Will show no infection signs and symptoms  Description: Will show no infection signs and symptoms  Outcome: Ongoing  Note: Pt's skin was assessed this shift and was found to be intact. Pt is able to turn themselves as needed and encouraged to turn frequently while awake. Pt is continent and is rarely moist. No evidence of any skin breakdown so far this shift. Will continue to monitor. Problem: Nutrition  Goal: Optimal nutrition therapy  Outcome: Ongoing  Note: Pt tolerating % of her meals, no issues with nausea. Will continue to monitor.

## 2021-01-05 NOTE — PROGRESS NOTES
has a past medical history of Allergic, Anemia, Arthritis, Chronic kidney disease, Depression, Diabetes mellitus ([address] Disorders affecting multiple structures of eye, Epigastric pain, GERD (gastroesophageal reflux disease), Hyperlipidemia, Hypertension, IBS (irritable bowel syndrome), Intermittent atrial fibrillation ([address] Mass of lung, Migraine, Nausea & vomiting, Status post lung surgery, and Thyroid disease. has a past surgical history that includes brain surgery; joint replacement; Cholecystectomy; Bunionectomy; Hysterectomy; thoracotomy (3/10/11); Endoscopy, colon, diagnostic (8/22/2011); Appendectomy; colectomy; Cataract removal (2012); and knee surgery (Left, 10/11/2016). Restrictions  Restrictions/Precautions  Restrictions/Precautions: Weight Bearing  Required Braces or Orthoses?: Yes(L knee immobilizer)  Lower Extremity Weight Bearing Restrictions  Left Lower Extremity Weight Bearing: Foot Flat Weight Bearing(orders say both TTWB and flat foot WBing)  Position Activity Restriction  Other position/activity restrictions: L TTWB and foot flat WB listed, L knee immobilizer on at all times. As of 12/31/2020, pt is allowed to be untetherered from wall in the w/c on the unit  Subjective   General  Chart Reviewed: Yes  Patient assessed for rehabilitation services?: Yes  Additional Pertinent Hx: 67 y/o F with mechanical fall at home in bathroom. Imaging revealed left distal femur fracture and is now TTWB on LLE. PMH significant for chronic back pain, bilateral total knee arthroplasties, & Diabetes  Response to previous treatment: Patient with no complaints from previous session  Family / Caregiver Present: No  Referring Practitioner: Dr Humble Mora  Diagnosis: L distal femur fracture  Subjective  Subjective: Pt seated in w/c upon arrival, pleasant and agreeable to therapy. Pt reporting her main concern prior to d/c later this week is increasing her independence with transfers. General Comment  Comments: . Vital Signs  Patient Currently in Pain: Yes(unrated pain in back and BLEs, pt denied notifying RN for pain meds stating she did not believe she was due yet)   Orientation  Orientation  Overall Orientation Status: Within Normal Limits  Objective    ADL  LE Dressing: Independent(Pt doffed/donned R shoe independently using figure 4 technique)  Additional Comments: Pt reporting moderate difficulty with tying shoes d/t arthritis. Pt educated on use of elastic shoelaces to increase ease of footwear d/t decreased FM strength and coordination. Therapist applied elastic shoelace to R shoe (L shoe not present). Balance  Sitting Balance: Modified independent (seated on RTS)  Standing Balance: Stand by assistance  Standing Balance  Time: ~1 min  Activity: functional transfers  Functional Mobility  Functional - Mobility Device: Wheelchair  Activity: To/from bathroom  Assist Level: Supervision  Functional Mobility Comments: Spvn in room and to bathroom doorway, Min A to maneuver w/c into bathroom and remove/repalce leg rest for toilet transfer  Toilet Transfers  Toilet - Technique: Stand pivot  Equipment Used: Raised toilet seat with rails  Toilet Transfer: Stand by assistance  Toilet Transfers Comments: Pt practice dry toilet transfer 2x with use of only horizontal GB to simulate home environment setup. Pt educated on improved foot placement for increased success with transfers. Pt completed both transfers with SBA and use of GB while maintaining LLE WBing precaution                             Cognition  Overall Cognitive Status: WFL                    Type of ROM/Therapeutic Exercise  Type of ROM/Therapeutic Exercise: Resistive Bands(orange theraband)  Comment: Pt completed the following BUE therex to increase strength and endurance for ADLs and functional transfers. Pt w/limited shoulder ROM d/t arthritis and educated on completing exercises within tolerance and stopping exerises if feeling pain or discomfort. Exercises  Horizontal ABduction: x15 reps  Elbow Flexion: x15 reps  Other: Diagonal up PNF movement x15; Diagonal down PNF movement x15 Second Session: Pt sitting in w/c finishing lunch upon entry, pleasant and agreeable to OT session but stating extreme fatigue from previous therapy sessions and c/o 8/10 pain in RLE/low back and 6/10 LLE pain. RN aware and provided pain medication during session. Pt requesting to use bathroom. Pt encouraged to complete w/c mobility into bathroom to setup for toilet transfer and practice maneuvering in tight spaces. Pt completed with spvn into bathroom doorway but with + time and VCs d/t backing in. Pt then required total A to turn towards toilet d/t space constraints with turning radius with L footrest extended. Once in bathroom pt able to setup for transfer at RTS and engage locks appropriately with mod I. Pt completed stand pivot toilet transfer from w/c using GBs with SBA while maintaining TTWB. Pt continent of bladder completing pericare seated on commode, overall pt toileting with SBA including LB clothing mgmt in stance with unilateral UE support at GB. Pt completed hand hygiene seated in w/c i'ly. Pt pushed to gift shop for sake of time and encouraged to complete w/c mobility within the gift shop to increase activity tolerance, BUE strength and environmental navigation with uneven surfaces for increase independence using w/c in home environment upon d/c. Pt able to navigate gift shop straightaways with mod I but requiring min A at times to navigate turns/tighter spaces d/t larger redman radius with L leg rest extended to manage LLE. Pt completed navigation of gift shop ~10 minutes with short rest breaks to look around store and d/t decreased activity tolerance. Pt requesting to get in bed at end of session. Pt setup w/c for transfer to bed with spvn and VCs for safer proximity to transferring surface which pt was able to correct. Stand pivot from w/c to EOB with SBA. Sit<supine with CGA and + time to manage LLE into bed. Pt able to scoot self superiorly in bed with use of bed rails.  Pt left in bed at end of session with bed alarm engaged, call light within reach and all needs met. Plan   Plan  Times per week: 90 minutes per day 5 days/week  Times per day: Daily  Current Treatment Recommendations: Strengthening, Patient/Caregiver Education & Training, Home Management Training, Equipment Evaluation, Education, & procurement, Balance Training, Functional Mobility Training, Endurance Training, Safety Education & Training, Self-Care / ADL, Wheelchair Mobility Training  G-Code     OutComes Score                                                  AM-PAC Score             Goals  Short term goals  Time Frame for Short term goals: STG=LTG  Long term goals  Time Frame for Long term goals : 2 weeks-all ongoing  Long term goal 1: Pt will complete LB dressing with use of AE prn with spvn (not met)  Long term goal 2: Pt will complete UB dressing with mod I (not met)  Long term goal 3: Pt will complete bathing with spvn (not met)  Long term goal 4: Pt will complete toilet transfer with spvn maintaining weightbearing restrictions (not met)  Long term goal 5: Pt will complete toileting with spvn (not met)  Patient Goals   Patient goals :  To be more independent       Therapy Time   Individual Concurrent Group Co-treatment   Time In 1000         Time Out 1030         Minutes 30         Timed Code Treatment Minutes: 30 Minutes     Second Session Therapy Time:   Individual Concurrent Group Co-treatment   Time In 3507        Time Out 1345        Minutes 60          Timed Code Treatment Minutes:  30 + 60    Total Treatment Minutes:  [address], LAURA Jones/L (second session only)

## 2021-01-05 NOTE — CARE COORDINATION
Spoke to patient and daughter, Jeanette Powell. Both updated on plan for d/c Friday. Daughter to come Thursday for family education. RN updated. Both notified and agreeable to home care. Daughter prefers [facility]. Referral made. Patient has walker, wheelchair and TTB. No other DME noted. No other needs at this time.   Electronically signed by BERYL Lopez LISW-S on 1/5/2021 at 4:20 PM

## 2021-01-05 NOTE — PLAN OF CARE
Problem: Pain:  Goal: Pain level will decrease  Description: Pain level will decrease  Outcome: Ongoing  Note: Pt c/o back and leg pain managed with PRN pain medication. Problem: Falls - Risk of:  Goal: Will remain free from falls  Description: Will remain free from falls  Outcome: Ongoing  Note: Pt is a High fall risk. Explained fall risk precautions to pt and rationale behind their use to keep the patient safe. Belongings are in reach. Pt encouraged to notify staff for any and all assistance. Staff present in tx suite throughout entirety of pts treatment to monitor and protect from falls. Assistance provided when ambulating to restroom utilizing Stay With Me. Problem: Skin Integrity:  Goal: Absence of new skin breakdown  Description: Absence of new skin breakdown  Outcome: Ongoing  Note: No evidence of new skin breakdown.

## 2021-01-05 NOTE — PROGRESS NOTES
Pt has been A&Ox4, VSS, lungs clear, no adventitious heart sounds, active bowel sounds, pt continuing to have loose BMs this shift, pain controlled with PRN pain meds, pt tolerating therapy well though visibly tired towards the end of the sessions. Will continue to monitor.

## 2021-01-05 NOTE — PROGRESS NOTES
Pt A & O. VSS. Pt x 1 SP to WC. All safety precautions in place. Bed alarm activated. Non skid socks on. Pt c/o back pain managed with PRN pain medication. No complaints of nausea. Low appetite. Pt c/o anxiousness managed with PRN xanax. Will cont to monitor.

## 2021-01-05 NOTE — PROGRESS NOTES
Department of Physical Medicine & Rehabilitation  Progress Note    Patient Identification:  Horacio Bautista  [id number]  : 1945  Admit date: 2020    Chief Complaint: L hip fracture     Subjective:   Patient seen at bedside this morning. Reports doing well and up in wheelchair, pain better controlled. ROS: No f/c, n/v, cp     Objective:  Patient Vitals for the past 24 hrs:   BP Temp Temp src Pulse Resp SpO2 Weight   21 0910 (!) 135/96 98.2 °F (36.8 °C) Axillary 85 18     21 0559       194 lb 14.2 oz (88.4 kg)   21 2046 98/63 97.5 °F (36.4 °C) Oral 63 18 95 %      Const: Alert. No distress, pleasant. HEENT: Normocephalic, atraumatic. Normal sclera/conjunctiva. MMM. CV: Regular rate and rhythm. Resp: No respiratory distress. Lungs CTAB. Abd: Soft, nontender, nondistended, NABS+   Ext: + edema bilaterally L>R  Neuro: Alert, oriented, appropriately interactive. Psych: Cooperative, appropriate mood and affect    Laboratory data: Available via EMR. Last 24 hour lab  Recent Results (from the past 24 hour(s))   POCT Glucose    Collection Time: 21 12:05 PM   Result Value Ref Range    POC Glucose 191 (H) 70 - 99 mg/dl    Performed on ACCU-CHEK    POCT Glucose    Collection Time: 21  4:56 PM   Result Value Ref Range    POC Glucose 182 (H) 70 - 99 mg/dl    Performed on ACCU-CHEK    POCT Glucose    Collection Time: 21  8:56 PM   Result Value Ref Range    POC Glucose 237 (H) 70 - 99 mg/dl    Performed on ACCU-CHEK    POCT Glucose    Collection Time: 21  8:43 AM   Result Value Ref Range    POC Glucose 150 (H) 70 - 99 mg/dl    Performed on ACCU-CHEK        Therapy progress:  PT  Position Activity Restriction  Other position/activity restrictions: L TTWB and foot flat WB listed, L knee immobilizer on at all times.  As of 2020, pt is allowed to be untetherered from wall in the w/c on the unit  Objective Sit to Stand: Stand by assistance, Supervision(STS from w/c , mat table and commode to a RW with TTWB on the LLE)  Stand to sit: Supervision(VC for hand placement)  Device: Rolling Walker  Other Apparatus: Wheelchair follow, Knee Immobilizer(R wrist splint)  Assistance: Stand by assistance  Distance: 15' x1, 25' x1  OT  PT Equipment Recommendations  Other: Ongoing assessment at this time  Toilet - Technique: Stand pivot  Equipment Used: Raised toilet seat with rails  Toilet Transfers Comments: use of GB; VCs to maintain WBing precautions  Assessment        SLP          Body mass index is 35.65 kg/m². Assessment and Plan:   Ms. Hannah Jimenez will require ongoing medical management and daily physician oversight for the followin. Left femur fracture. - s/p ORIF 2020. Patient will proceed with treatment per orthopedic protocol.   - PT/OT required      2. Diabetes. - home  Metformin started. D/C insulin     3. Coagulopathy. (heterozygous factor V Leiden deficiency). - continue Eliquis 2.5 mg by mouth twice a day per hematology recommendations.     4. Coronary artery disease.   - continue Clopidogrel      5. UTI. Patient's preprocedure urinalysis is consistent with urinary tract infection, greater than 100,000 CFU Klebsiella pneumonia.   - s/p Rocephin  - resolved    6. Normocytic anemia. Hemoglobin on admission was 11.8.   - Hgb stable 8s    7.  Multi-level Degenerative Disc Disease  - Lumbar MRI on 20 with L4-5 circumferential disc bulge with a right synovial facet cyst in the posterior right epidural space resulting in severe stenosis of the right subarticular zone/lateral recess, with mass effect on the descending right nerve roots  - Ortho consulted and recs appreciated  - cont Prednisone taper    Rehab Progress: Improving  Anticipated Dispo: home  Services/DME: TBD  ELOS: TBD    Gerry Thompson MD  PGY-3    Matty Ying D.O. M.P.H  PM&R  2021  10:47 AM

## 2021-01-06 LAB
ANION GAP SERPL CALCULATED.3IONS-SCNC: 13 MMOL/L (ref 3–16)
BASOPHILS ABSOLUTE: 0.1 K/UL (ref 0–0.2)
BASOPHILS RELATIVE PERCENT: 1.4 %
BUN BLDV-MCNC: 21 MG/DL (ref 7–20)
CALCIUM SERPL-MCNC: 9.4 MG/DL (ref 8.3–10.6)
CHLORIDE BLD-SCNC: 91 MMOL/L (ref 99–110)
CO2: 20 MMOL/L (ref 21–32)
CREAT SERPL-MCNC: 1 MG/DL (ref 0.6–1.2)
CREATININE URINE: 29.5 MG/DL (ref 28–259)
EOSINOPHILS ABSOLUTE: 0.3 K/UL (ref 0–0.6)
EOSINOPHILS RELATIVE PERCENT: 3.6 %
GFR AFRICAN AMERICAN: >60
GFR NON-AFRICAN AMERICAN: 54
GLUCOSE BLD-MCNC: 111 MG/DL (ref 70–99)
GLUCOSE BLD-MCNC: 135 MG/DL (ref 70–99)
GLUCOSE BLD-MCNC: 149 MG/DL (ref 70–99)
GLUCOSE BLD-MCNC: 155 MG/DL (ref 70–99)
GLUCOSE BLD-MCNC: 200 MG/DL (ref 70–99)
HCT VFR BLD CALC: 28.8 % (ref 36–48)
HEMOGLOBIN: 9.5 G/DL (ref 12–16)
LYMPHOCYTES ABSOLUTE: 2.5 K/UL (ref 1–5.1)
LYMPHOCYTES RELATIVE PERCENT: 31.9 %
MCH RBC QN AUTO: 30 PG (ref 26–34)
MCHC RBC AUTO-ENTMCNC: 32.9 G/DL (ref 31–36)
MCV RBC AUTO: 91.1 FL (ref 80–100)
MONOCYTES ABSOLUTE: 1.2 K/UL (ref 0–1.3)
MONOCYTES RELATIVE PERCENT: 16 %
NEUTROPHILS ABSOLUTE: 3.7 K/UL (ref 1.7–7.7)
NEUTROPHILS RELATIVE PERCENT: 47.1 %
OSMOLALITY URINE: 286 MOSM/KG (ref 390–1070)
OSMOLALITY: 280 MOSM/KG (ref 278–305)
PDW BLD-RTO: 17.2 % (ref 12.4–15.4)
PERFORMED ON: ABNORMAL
PLATELET # BLD: 495 K/UL (ref 135–450)
PMV BLD AUTO: 8.3 FL (ref 5–10.5)
POTASSIUM REFLEX MAGNESIUM: 4.1 MMOL/L (ref 3.5–5.1)
RBC # BLD: 3.16 M/UL (ref 4–5.2)
SODIUM BLD-SCNC: 124 MMOL/L (ref 136–145)
SODIUM URINE: 58 MMOL/L
WBC # BLD: 7.8 K/UL (ref 4–11)

## 2021-01-06 PROCEDURE — 36415 COLL VENOUS BLD VENIPUNCTURE: CPT

## 2021-01-06 PROCEDURE — 97116 GAIT TRAINING THERAPY: CPT

## 2021-01-06 PROCEDURE — 97530 THERAPEUTIC ACTIVITIES: CPT

## 2021-01-06 PROCEDURE — 80048 BASIC METABOLIC PNL TOTAL CA: CPT

## 2021-01-06 PROCEDURE — 6370000000 HC RX 637 (ALT 250 FOR IP): Performed by: INTERNAL MEDICINE

## 2021-01-06 PROCEDURE — 6370000000 HC RX 637 (ALT 250 FOR IP): Performed by: STUDENT IN AN ORGANIZED HEALTH CARE EDUCATION/TRAINING PROGRAM

## 2021-01-06 PROCEDURE — 82570 ASSAY OF URINE CREATININE: CPT

## 2021-01-06 PROCEDURE — 85025 COMPLETE CBC W/AUTO DIFF WBC: CPT

## 2021-01-06 PROCEDURE — 83930 ASSAY OF BLOOD OSMOLALITY: CPT

## 2021-01-06 PROCEDURE — 97535 SELF CARE MNGMENT TRAINING: CPT

## 2021-01-06 PROCEDURE — 97116 GAIT TRAINING THERAPY: CPT | Performed by: PHYSICAL THERAPIST

## 2021-01-06 PROCEDURE — 6370000000 HC RX 637 (ALT 250 FOR IP): Performed by: PHYSICAL MEDICINE & REHABILITATION

## 2021-01-06 PROCEDURE — 83935 ASSAY OF URINE OSMOLALITY: CPT

## 2021-01-06 PROCEDURE — 97542 WHEELCHAIR MNGMENT TRAINING: CPT | Performed by: PHYSICAL THERAPIST

## 2021-01-06 PROCEDURE — 97530 THERAPEUTIC ACTIVITIES: CPT | Performed by: PHYSICAL THERAPIST

## 2021-01-06 PROCEDURE — 1280000000 HC REHAB R&B

## 2021-01-06 PROCEDURE — 84300 ASSAY OF URINE SODIUM: CPT

## 2021-01-06 PROCEDURE — 97110 THERAPEUTIC EXERCISES: CPT

## 2021-01-06 RX ORDER — PREDNISONE 10 MG/1
10 TABLET ORAL DAILY
Status: DISCONTINUED | OUTPATIENT
Start: 2021-01-06 | End: 2021-01-08 | Stop reason: HOSPADM

## 2021-01-06 RX ADMIN — DIPHENOXYLATE HYDROCHLORIDE AND ATROPINE SULFATE 1 TABLET: 2.5; .025 TABLET ORAL at 08:39

## 2021-01-06 RX ADMIN — METFORMIN HYDROCHLORIDE 500 MG: 500 TABLET ORAL at 08:38

## 2021-01-06 RX ADMIN — SERTRALINE HYDROCHLORIDE 100 MG: 100 TABLET ORAL at 08:37

## 2021-01-06 RX ADMIN — ONDANSETRON 4 MG: 4 TABLET, ORALLY DISINTEGRATING ORAL at 16:01

## 2021-01-06 RX ADMIN — DICLOFENAC 2 G: 10 GEL TOPICAL at 08:43

## 2021-01-06 RX ADMIN — GABAPENTIN 400 MG: 400 CAPSULE ORAL at 21:16

## 2021-01-06 RX ADMIN — TRAMADOL HYDROCHLORIDE 50 MG: 50 TABLET, COATED ORAL at 21:41

## 2021-01-06 RX ADMIN — CLOPIDOGREL BISULFATE 75 MG: 75 TABLET ORAL at 08:38

## 2021-01-06 RX ADMIN — ALPRAZOLAM 0.5 MG: 0.5 TABLET ORAL at 23:30

## 2021-01-06 RX ADMIN — PANTOPRAZOLE SODIUM 40 MG: 40 TABLET, DELAYED RELEASE ORAL at 06:12

## 2021-01-06 RX ADMIN — ANTACID TABLETS 500 MG: 500 TABLET, CHEWABLE ORAL at 15:06

## 2021-01-06 RX ADMIN — PREDNISONE 10 MG: 10 TABLET ORAL at 08:38

## 2021-01-06 RX ADMIN — TRAMADOL HYDROCHLORIDE 50 MG: 50 TABLET, COATED ORAL at 10:06

## 2021-01-06 RX ADMIN — INSULIN LISPRO 1 UNITS: 100 INJECTION, SOLUTION INTRAVENOUS; SUBCUTANEOUS at 21:10

## 2021-01-06 RX ADMIN — TIZANIDINE 4 MG: 4 TABLET ORAL at 16:27

## 2021-01-06 RX ADMIN — DIPHENOXYLATE HYDROCHLORIDE AND ATROPINE SULFATE 1 TABLET: 2.5; .025 TABLET ORAL at 21:16

## 2021-01-06 RX ADMIN — METOPROLOL SUCCINATE 50 MG: 50 TABLET, EXTENDED RELEASE ORAL at 08:38

## 2021-01-06 RX ADMIN — INSULIN LISPRO 4 UNITS: 100 INJECTION, SOLUTION INTRAVENOUS; SUBCUTANEOUS at 11:59

## 2021-01-06 RX ADMIN — PRAVASTATIN SODIUM 10 MG: 10 TABLET ORAL at 21:17

## 2021-01-06 RX ADMIN — METFORMIN HYDROCHLORIDE 1000 MG: 1000 TABLET ORAL at 21:16

## 2021-01-06 RX ADMIN — GABAPENTIN 400 MG: 400 CAPSULE ORAL at 08:38

## 2021-01-06 RX ADMIN — Medication 15 G: at 08:39

## 2021-01-06 RX ADMIN — HYDROCODONE BITARTRATE AND ACETAMINOPHEN 2 TABLET: 5; 325 TABLET ORAL at 12:06

## 2021-01-06 RX ADMIN — APIXABAN 2.5 MG: 5 TABLET, FILM COATED ORAL at 21:16

## 2021-01-06 RX ADMIN — TIZANIDINE 4 MG: 4 TABLET ORAL at 23:30

## 2021-01-06 RX ADMIN — HYDROCODONE BITARTRATE AND ACETAMINOPHEN 2 TABLET: 5; 325 TABLET ORAL at 06:10

## 2021-01-06 RX ADMIN — APIXABAN 2.5 MG: 5 TABLET, FILM COATED ORAL at 08:38

## 2021-01-06 RX ADMIN — GABAPENTIN 400 MG: 400 CAPSULE ORAL at 13:25

## 2021-01-06 RX ADMIN — INSULIN LISPRO 2 UNITS: 100 INJECTION, SOLUTION INTRAVENOUS; SUBCUTANEOUS at 17:16

## 2021-01-06 RX ADMIN — DICLOFENAC 2 G: 10 GEL TOPICAL at 21:17

## 2021-01-06 RX ADMIN — ONDANSETRON 4 MG: 4 TABLET, ORALLY DISINTEGRATING ORAL at 07:33

## 2021-01-06 RX ADMIN — LEVOTHYROXINE SODIUM 125 MCG: 0.03 TABLET ORAL at 06:11

## 2021-01-06 ASSESSMENT — PAIN DESCRIPTION - PAIN TYPE
TYPE: ACUTE PAIN;CHRONIC PAIN
TYPE: CHRONIC PAIN
TYPE_2: SURGICAL
TYPE: ACUTE PAIN;CHRONIC PAIN

## 2021-01-06 ASSESSMENT — PAIN DESCRIPTION - ORIENTATION
ORIENTATION_2: LEFT
ORIENTATION: RIGHT

## 2021-01-06 ASSESSMENT — PAIN SCALES - GENERAL
PAINLEVEL_OUTOF10: 9
PAINLEVEL_OUTOF10: 7
PAINLEVEL_OUTOF10: 8
PAINLEVEL_OUTOF10: 7

## 2021-01-06 ASSESSMENT — PAIN DESCRIPTION - DESCRIPTORS
DESCRIPTORS: ACHING;DISCOMFORT
DESCRIPTORS: BURNING;STABBING
DESCRIPTORS: ACHING;DISCOMFORT;SORE
DESCRIPTORS_2: ACHING;DISCOMFORT

## 2021-01-06 ASSESSMENT — PAIN DESCRIPTION - LOCATION
LOCATION: BACK
LOCATION: BACK;LEG

## 2021-01-06 ASSESSMENT — PAIN DESCRIPTION - PROGRESSION: CLINICAL_PROGRESSION: GRADUALLY IMPROVING

## 2021-01-06 ASSESSMENT — PAIN DESCRIPTION - FREQUENCY
FREQUENCY: INTERMITTENT

## 2021-01-06 ASSESSMENT — PAIN DESCRIPTION - DURATION: DURATION_2: CONTINUOUS

## 2021-01-06 ASSESSMENT — PAIN DESCRIPTION - ONSET: ONSET_2: ON-GOING

## 2021-01-06 NOTE — PROGRESS NOTES
Nephrology consult [employer] to Lebron Luque MD at 1299. Consult received by provider. No new orders at this time. RN will continue to monitor Pt.

## 2021-01-06 NOTE — PROGRESS NOTES
Department of Physical Medicine & Rehabilitation  Progress Note    Patient Identification:  Leighton Chanel  [id number]  : 1945  Admit date: 2020    Chief Complaint: L hip fracture     Subjective:   Patient seen at bedside this morning. R hip is still in pain. Overall doing well. No acute events overnight. Will adjust medications appropriately. ROS: No f/c, n/v, cp     Objective:  Patient Vitals for the past 24 hrs:   BP Temp Temp src Pulse Resp SpO2   21 0835 (!) 171/76 97.4 °F (36.3 °C) Oral 87 18 97 %   21 2130 108/68 98 °F (36.7 °C) Oral 57 18 95 %   21 1549 126/67   65     21 0910 (!) 135/96 98.2 °F (36.8 °C) Axillary 85 18      Const: Alert. No distress, pleasant. HEENT: Normocephalic, atraumatic. Normal sclera/conjunctiva. MMM. CV: Regular rate and rhythm. Resp: No respiratory distress. Lungs CTAB. Abd: Soft, nontender, nondistended, NABS+   Ext: + edema bilaterally L>R  Neuro: Alert, oriented, appropriately interactive. Psych: Cooperative, appropriate mood and affect    Laboratory data: Available via EMR.    Last 24 hour lab  Recent Results (from the past 24 hour(s))   POCT Glucose    Collection Time: 21 11:48 AM   Result Value Ref Range    POC Glucose 146 (H) 70 - 99 mg/dl    Performed on ACCU-CHEK    POCT Glucose    Collection Time: 21  4:50 PM   Result Value Ref Range    POC Glucose 224 (H) 70 - 99 mg/dl    Performed on ACCU-CHEK    POCT Glucose    Collection Time: 21  9:48 PM   Result Value Ref Range    POC Glucose 187 (H) 70 - 99 mg/dl    Performed on ACCU-CHEK    Basic Metabolic Panel w/ Reflex to MG    Collection Time: 21  6:45 AM   Result Value Ref Range    Sodium 124 (L) 136 - 145 mmol/L    Potassium reflex Magnesium 4.1 3.5 - 5.1 mmol/L    Chloride 91 (L) 99 - 110 mmol/L    CO2 20 (L) 21 - 32 mmol/L    Anion Gap 13 3 - 16    Glucose 111 (H) 70 - 99 mg/dL    BUN 21 (H) 7 - 20 mg/dL    CREATININE 1.0 0.6 - 1.2 mg/dL GFR Non- 54 (A) >60    GFR African American >60 >60    Calcium 9.4 8.3 - 10.6 mg/dL   CBC auto differential    Collection Time: 01/06/21  6:45 AM   Result Value Ref Range    WBC 7.8 4.0 - 11.0 K/uL    RBC 3.16 (L) 4.00 - 5.20 M/uL    Hemoglobin 9.5 (L) 12.0 - 16.0 g/dL    Hematocrit 28.8 (L) 36.0 - 48.0 %    MCV 91.1 80.0 - 100.0 fL    MCH 30.0 26.0 - 34.0 pg    MCHC 32.9 31.0 - 36.0 g/dL    RDW 17.2 (H) 12.4 - 15.4 %    Platelets 511 (H) 868 - 450 K/uL    MPV 8.3 5.0 - 10.5 fL    Neutrophils % 47.1 %    Lymphocytes % 31.9 %    Monocytes % 16.0 %    Eosinophils % 3.6 %    Basophils % 1.4 %    Neutrophils Absolute 3.7 1.7 - 7.7 K/uL    Lymphocytes Absolute 2.5 1.0 - 5.1 K/uL    Monocytes Absolute 1.2 0.0 - 1.3 K/uL    Eosinophils Absolute 0.3 0.0 - 0.6 K/uL    Basophils Absolute 0.1 0.0 - 0.2 K/uL   POCT Glucose    Collection Time: 01/06/21  7:33 AM   Result Value Ref Range    POC Glucose 135 (H) 70 - 99 mg/dl    Performed on ACCU-CHEK        Therapy progress:  PT  Position Activity Restriction  Other position/activity restrictions: L TTWB and foot flat WB listed, L knee immobilizer on at all times.  As of 12/31/2020, pt is allowed to be untetherered from wall in the w/c on the unit  Objective     Sit to Stand: Supervision(STS from w/c , mat table and car simualtor to a RW with TTWB on the LLE)  Stand to sit: Supervision(VC for hand placement)  Device: Rolling Walker  Other Apparatus: Knee Immobilizer(R wrist splint)  Assistance: Supervision  Distance: 20' x2  OT  PT Equipment Recommendations  Other: Ongoing assessment at this time  Toilet - Technique: Stand pivot  Equipment Used: Raised toilet seat with rails

## 2021-01-06 NOTE — PROGRESS NOTES
Pt complaining of new onset R shin numbness raditaing up from her toes. Pt assessed and Heis DO notified at [phone]. No new orders at this time. RN will continue to monitor Pt. Pt still complaining of R shin numbness despite repositioning. Ortho surgery paged at [phone]. Елена Garcia MD called back at 1800 and said to continue to monitor Pt overnight for continued or worsening symptoms. No other orders given at this time. RN will continue to monitor Pt.

## 2021-01-06 NOTE — CONSULTS
Nephrology Consult Note                                                                                                                                                                                                                                                                                                                                                               Office : [phone]     Fax :[phone]              Patient's Name: Clark Dow  10:24 AM  1/6/2021    Reason for Consult:  hyponatremia  Requesting Physician:  Abby Waller MD      Chief Complaint:  L hip fracture     History of Present Ilness:    Clark Dow is a 66 y.o. female who fell in her bathroom and sustained a left femur fracture s/p ORIF on 12/20/2020. She had multiple post-op complications including UTI, anemia, and right sided sciatica. During her stay in ARU a code blue was called for a syncopal episode and hypotension. Patient never lost a pulse. Bolused 500cc NS at that time with resolution. Today, she is currently still having R hip pain. Consulted due to her sodium dropping to 124 on this morning's labs.      Past Medical History:   Diagnosis Date    Allergic     Anemia     Arthritis     Chronic kidney disease     Depression     Diabetes mellitus (HCC)     Disorders affecting multiple structures of eye     Epigastric pain     GERD (gastroesophageal reflux disease)     Hyperlipidemia     Hypertension     IBS (irritable bowel syndrome)     Intermittent atrial fibrillation ([facility] [address] 3/12/2011    Mass of lung     right    Migraine     Nausea & vomiting     Status post lung surgery 3/10/2011    Thyroid disease        Past Surgical History:   Procedure Laterality Date    APPENDECTOMY      BRAIN SURGERY      BUNIONECTOMY      CATARACT REMOVAL  2012    CHOLECYSTECTOMY      COLECTOMY      ENDOSCOPY, COLON, DIAGNOSTIC  8/22/2011    HYSTERECTOMY      JOINT REPLACEMENT      KNEE SURGERY Left 10/11/2016  THORACOTOMY  3/10/11    right thoracotomy;right video assissted thoroscopy biopsey of mediastinal mass for ffrozen  right lower lobe wedge resection for frozen; thymusectomy       Family History   Problem Relation Age of Onset    Elevated Lipids Mother     Coronary Art Dis Mother    Roman Hews Migraines Mother     Hypertension Mother     Kidney Disease Mother     Diabetes Mother     Stroke Father     Hypertension Father     Cancer Sister         ovarian and breast    Coronary Art Dis Brother     Diabetes Brother     Kidney Disease Sister     Hypertension Sister     Hypertension Brother     Diabetes Sister     Migraines Sister     Migraines Brother     Alcohol Abuse Brother         reports that she has never smoked. She has never used smokeless tobacco. She reports that she does not drink alcohol or use drugs.     Allergies:  Aspirin, Codeine, Other, Oxycodone, Percocet [oxycodone-acetaminophen], Trimethoprim, Vicodin [hydrocodone-acetaminophen], Bactrim, Dilaudid [hydromorphone hcl], and Penicillins    Current Medications:        predniSONE (DELTASONE) tablet 10 mg, Daily      gabapentin (NEURONTIN) capsule 400 mg, TID      tiZANidine (ZANAFLEX) tablet 4 mg, Q8H PRN      ondansetron (ZOFRAN-ODT) disintegrating tablet 4 mg, Q8H PRN      calcium carbonate (TUMS) chewable tablet 500 mg, TID PRN      sennosides-docusate sodium (SENOKOT-S) 8.6-50 MG tablet 2 tablet, BID PRN      insulin lispro (1 Unit Dial) 0-12 Units, TID WC      insulin lispro (1 Unit Dial) 0-6 Units, Nightly      glucose (GLUTOSE) 40 % oral gel 15 g, PRN      dextrose 50 % IV solution, PRN      glucagon (rDNA) injection 1 mg, PRN      dextrose 5 % solution, PRN      HYDROcodone-acetaminophen (NORCO) 5-325 MG per tablet 2 tablet, Q6H PRN      metFORMIN (GLUCOPHAGE) tablet 500 mg, Daily with breakfast      metFORMIN (GLUCOPHAGE) tablet 1,000 mg, Nightly      hydrocortisone (ANUSOL-HC) 2.5 % rectal cream, BID Respiratory: CTA B without w/r/r  Abdomen:  +bs, soft, nt, nd  Ext: + lower extremity edema, R > L  Psychiatric: mood and affect appropriate  Musculoskeletal:  Rom, muscular strength intact    Data:   Labs:  CBC:   Recent Labs     01/03/21  1502 01/04/21  0601 01/06/21  0645   WBC 9.9 9.0 7.8   HGB 8.0* 8.7* 9.5*    446 495*     BMP:    Recent Labs     01/03/21  1711 01/04/21  0601 01/06/21  0645   * 133* 124*   K 5.7* 4.3 4.1   CL 98* 100 91*   CO2 21 23 20*   BUN 28* 24* 21*   CREATININE 1.1 1.1 1.0   GLUCOSE 166* 129* 111*     Ca/Mg/Phos:   Recent Labs     01/03/21  1711 01/04/21  0601 01/06/21  0645   CALCIUM 8.7 9.1 9.4   PHOS 3.0  --   --      Hepatic: No results for input(s): AST, ALT, ALB, BILITOT, ALKPHOS in the last 72 hours. Troponin:   Recent Labs     01/03/21  1502   TROPONINI 0.02*     BNP: No results for input(s): BNP in the last 72 hours. Lipids: No results for input(s): CHOL, TRIG, HDL, LDLCALC, LABVLDL in the last 72 hours. ABGs: No results for input(s): PHART, PO2ART, XQJ6FSG in the last 72 hours. INR: No results for input(s): INR in the last 72 hours. UA:No results for input(s): Leopoldo Saupe, GLUCOSEU, BILIRUBINUR, Lucio Long, BLOODU, PHUR, PROTEINU, UROBILINOGEN, NITRU, LEUKOCYTESUR, LABMICR, URINETYPE in the last 72 hours. Urine Microscopic: No results for input(s): LABCAST, BACTERIA, COMU, HYALCAST, WBCUA, RBCUA, EPIU in the last 72 hours. Urine Culture: No results for input(s): LABURIN in the last 72 hours. Urine Chemistry: No results for input(s): Dahlia Decent, PROTEINUR, NAUR in the last 72 hours. IMAGING:  XR LUMBAR SPINE (2-3 VIEWS)   Final Result   Impression: No acute osseous abnormality. Grade 1 retrolisthesis of L1 on L2. Multilevel spondylosis and facet arthropathy. XR HIP 2-3 VW W PELVIS RIGHT   Final Result   Impression:   1. No acute fracture. 2. Degenerative changes along pubic symphysis.           Assessment/Plan 1. 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 HTN    3. Anemia    4. Acid- base/ Electrolyte imbalance     5. UTI    6. Factor V Leiden deficiency    7. DM2     8.  L femur fracture    - Urea packets, increase solute intake (OK to take home protein supplements)  - Fluid restriction 1.2L/d  - Stopped HCTZ  - follow up urine labs to confirm  - Na lab at 6 PM tonight  - Goal sodium 130 by tomorrow AM        Thank you for allowing us to participate in care of Antwon Pedraza MD   Internal Medicine, PGY3  1/6/2021 10:24 AM  Reach via [address] with plan above     Latosha Hernandez

## 2021-01-06 NOTE — PROGRESS NOTES
Occupational Therapy  Facility/Department: [facility] ACUTE REHAB UNIT  Daily Treatment Note  NAME: Iris Whittington  : 1945  MRN: [id number]    Date of Service: 2021    Discharge Recommendations:  Continue to assess pending progress, 24 hour supervision or assist, Home with Home health OT  OT Equipment Recommendations  Equipment Needed: Yes  Mobility Devices: ADL Assistive Devices  ADL Assistive Devices: Shower Chair with back  Other: continue to assess pending progress    Assessment   Performance deficits / Impairments: Decreased functional mobility ; Decreased endurance;Decreased ADL status; Decreased balance;Decreased strength;Decreased high-level IADLs;Decreased fine motor control  Assessment: Pt tolerated OT sessions well. Pt fatigues as session progress requiring more frequent rest breaks. Pt performing functional transfers/mobility SBA short distances using RW with good adherence to TTWB. Pt performing ADLs Set up to [facility] with extra time and min cues for safety. Pt able to tolerate BUE strengthening exercises using theraband to increase strength and endurance needed for ADLs and functional transfers. Pt remains below baseline level of occupational performance. Recommend continued OT services to increase safety and independence with ADLs and functional transfers. Treatment Diagnosis: decreased ADL status, functional transfers and functional mobility 2/2 L distal femur fx  Prognosis: Good      OT Education: OT Role;ADL Adaptive Strategies;Transfer Training;Precautions; Energy Conservation  Patient Education: home safety concerns, OT discharge recommendations, safety with LB bathing/dressing, management of knee immobilizer, toilet transfers, BUE strengthening exercises  Barriers to Learning: pt demonstrates and verbalizes understanding, forgetful and may benefit from repetition.   REQUIRES OT FOLLOW UP: Yes    Activity Tolerance  Activity Tolerance: Patient Tolerated treatment well;Patient limited by pain Safety Devices  Safety Devices in place: Yes  Type of devices: Call light within reach; Chair alarm in place; Left in chair         Patient Diagnosis(es): There were no encounter diagnoses. has a past medical history of Allergic, Anemia, Arthritis, Chronic kidney disease, Depression, Diabetes mellitus ([address] Disorders affecting multiple structures of eye, Epigastric pain, GERD (gastroesophageal reflux disease), Hyperlipidemia, Hypertension, IBS (irritable bowel syndrome), Intermittent atrial fibrillation ([address] Mass of lung, Migraine, Nausea & vomiting, Status post lung surgery, and Thyroid disease. has a past surgical history that includes brain surgery; joint replacement; Cholecystectomy; Bunionectomy; Hysterectomy; thoracotomy (3/10/11); Endoscopy, colon, diagnostic (8/22/2011); Appendectomy; colectomy; Cataract removal (2012); and knee surgery (Left, 10/11/2016). Restrictions  Restrictions/Precautions  Restrictions/Precautions: Weight Bearing  Required Braces or Orthoses?: Yes  Lower Extremity Weight Bearing Restrictions  Left Lower Extremity Weight Bearing: Foot Flat Weight Bearing  Required Braces or Orthoses  Left Lower Extremity Brace: Knee Immobilizer  Position Activity Restriction  Other position/activity restrictions: L TTWB and foot flat WB listed, L knee immobilizer on at all times. As of 12/31/2020, pt is allowed to be untetherered from wall in the w/c on the unit     Subjective   General  Chart Reviewed: Yes  Patient assessed for rehabilitation services?: Yes    Additional Pertinent Hx: 65 y/o F with mechanical fall at home in bathroom. Imaging revealed left distal femur fracture and is now TTWB on LLE. Pt underwent ORIF on 12/20/20. Hospitalization complicated by UTI, anemia, pain postop.  PMH significant for chronic back pain, bilateral total knee arthroplasties, & Diabetes      Response to previous treatment: Patient with no complaints from previous session  Family / Caregiver Present: No Referring Practitioner: Dr Aylin Lynch    Diagnosis: L distal femur fracture. Pt underwent ORIF on 12/20/20. Postop complicated by UTI, anemia, pain. Subjective  Subjective: Pt seated in chair upon OT arrival. Pt reports that her chronic back pain has been high this morning. General Comment  Comments: RN agreeable to therapy session. Per team xenia, plans for family training Thursday 1/7/21 with Daughter starting at 9am    Pain Assessment  Pain Assessment: 0-10  Pain Level: 9  Patient's Stated Pain Goal: No pain  Pain Type: Acute pain;Chronic pain  Pain Location: Back  Pain Descriptors: Aching;Discomfort; Sore  Pain Frequency: Intermittent  Pain Onset: On-going  Functional Pain Assessment: Prevents or interferes some active activities and ADLs  Non-Pharmaceutical Pain Intervention(s): Distraction;Repositioned; Emotional support; Heat applied  Response to Pain Intervention: Patient Satisfied  Multiple Pain Sites: Yes  Pre Treatment Pain Screening  Intervention List: Nurse/Physician notified; Patient able to continue with treatment  Vital Signs  Patient Currently in Pain: Yes     Objective    ADL  UE Bathing: Supervision;Setup(sponge bathing seated EOB)  LE Bathing: Contact guard assistance(sponge bathing, pt washes 4/4 body parts requiring steadying assist when in stance. OT assist with washing L upper/lower leg while adjusting knee immobilizer with pt supine in bed)  UE Dressing: Supervision;Setup(doff/don bra and pull over shirt seated EOB)  LE Dressing: Contact guard assistance; Increased time to complete;Setup;Verbal cueing(doff/don underwear and pants using reacher, CGA steadying assist when in stance to don over hips with intermittent UE support on RW.)  Additional Comments: OT assists pt with adjusting knee immobilizer while pt supine in bed as knee immobilizer slip down.            Balance  Sitting Balance: Supervision(seated EOB) Standing Balance: Contact guard assistance(SBA, CGA as pt fatigued in stance during ADLs. Pt uses RW)  Functional Mobility  Functional - Mobility Device: Rolling Walker  Assist Level: Stand by assistance  Functional Mobility Comments: 3 feet x2 w/c <> EOB using RW    Bed mobility  Supine to Sit: Supervision(bedrail)  Sit to Supine: Moderate assistance(assist with BLE)     Transfers  Stand Step Transfers: Stand by assistance  Sit to stand: Stand by assistance  Stand to sit: Stand by assistance  Transfer Comments: to/from EOB and w/c using RW x6 reps. Cognition  Overall Cognitive Status: [facility]  Cognition Comment: pleasant and cooperative. Pt forgetful during instruction on BUE theraband exercises requiring frequent repetition. PM OT session:  -functional transfers: SBA to/from w/c   -functional ambulation 12 feet x2 using RW SBA  -toilet transfers: SBA to/from raised toilet seated using grab bar and RW for approach.   -toileting: SBA, pt performs 3/3 steps with extra time, intermittent UE support on RW/grab bar. Min cues for safety.   -grooming: SBA standing at sink to wash hands with intermittent UE support on counter/RW     -Type of ROM/Therapeutic Exercise  Type of ROM/Therapeutic Exercise: Resistive Bands  Comment: Pt completed the following BUE therex to increase strength and endurance for ADLs and functional transfers. Pt w/limited shoulder ROM d/t arthritis and educated on completing exercises within tolerance and stopping exerises if feeling pain or discomfort. Exercises  Shoulder Flexion: x10 LUE with  resistance bands; x10 RUE AROM without resistance  Shoulder ABduction: x10 LUE with  resistance bands; x10 RUE AROM without resistance  Horizontal ABduction: x10 BUE  Elbow Flexion: x10 BUE  Elbow Extension: 2x10 BUE      -Pt left seated in w/c, call light in reach, LLE elevated, all needs met, alarm activated.                   Plan   Plan  Times per week: 90 minutes per day 5 days/week

## 2021-01-06 NOTE — PROGRESS NOTES
Removed pt's L arm IV. Was bleeding at site upon awakening, no removal complications. Bruising around site. Will cont to monitor.

## 2021-01-06 NOTE — PLAN OF CARE
Problem: Falls - Risk of:  Goal: Will remain free from falls  Description: Will remain free from falls  1/6/2021 1036 by Nidhi Sommers RN  Outcome: Ongoing  Note: Pt educated to use her call light when she needs assistance. Pt also educated not to get up unassisted at this time. Bed alarm on when Pt in bed and chair alarm on when Pt up in chair. Non skid socks on and call light placed within reach. RN will continue to monitor Pt. 1/6/2021 0032 by Harris Longo RN  Outcome: Ongoing  Note: Pt has remained free from falls during shift. Call light and belongings within reach. Pt calls out appropriately for needs. Pt is x1 s/p to WC. Fall precautions in place, bed alarm is set, wheels locked and in lowest position.

## 2021-01-06 NOTE — PROGRESS NOTES
Physical Therapy  Facility/Department: [facility] ACUTE REHAB UNIT  Daily Treatment Note  NAME: Sage Licona  : 1945  MRN: [id number]    Date of Service: 2021    Discharge Recommendations:  Home with assist PRN, Patient would benefit from continued therapy after discharge, Home with Home health PT   PT Equipment Recommendations  Other: Ongoing assessment at this time    Assessment   Body structures, Functions, Activity limitations: Decreased functional mobility ; Decreased ADL status; Decreased endurance;Decreased strength;Decreased balance; Increased pain;Decreased ROM  Assessment: Pt's ambulation distances limited this session by reported fatigue and back pain, however pt demonstrated improved ability to maintain TTWB LLE during bout. Pt continues to have difficulty maintaining TTWB during sit to/from stand transfers. Pt will continue to benefit from skilled therapy to maximize safety and independence. Treatment Diagnosis: Decreased functional mobility 2/2 to distal femur fx  Patient Education: Continued reinforcement of TTWB LLE precautions  REQUIRES PT FOLLOW UP: Yes  Activity Tolerance  Activity Tolerance: Patient limited by fatigue;Patient limited by pain; Patient limited by endurance     Patient Diagnosis(es): There were no encounter diagnoses. has a past medical history of Allergic, Anemia, Arthritis, Chronic kidney disease, Depression, Diabetes mellitus ([address] Disorders affecting multiple structures of eye, Epigastric pain, GERD (gastroesophageal reflux disease), Hyperlipidemia, Hypertension, IBS (irritable bowel syndrome), Intermittent atrial fibrillation ([address] Mass of lung, Migraine, Nausea & vomiting, Status post lung surgery, and Thyroid disease. has a past surgical history that includes brain surgery; joint replacement; Cholecystectomy; Bunionectomy; Hysterectomy; thoracotomy (3/10/11); Endoscopy, colon, diagnostic (8/22/2011); Appendectomy; colectomy; Cataract removal (2012); and knee surgery (Left, 10/11/2016). Restrictions  Restrictions/Precautions  Restrictions/Precautions: Weight Bearing  Required Braces or Orthoses?: Yes  Lower Extremity Weight Bearing Restrictions  Left Lower Extremity Weight Bearing: Foot Flat Weight Bearing  Required Braces or Orthoses  Left Lower Extremity Brace: Knee Immobilizer  Position Activity Restriction  Other position/activity restrictions: L TTWB and foot flat WB listed, L knee immobilizer on at all times. As of 12/31/2020, pt is allowed to be untetherered from wall in the w/c on the unit  Subjective    General  Chart Reviewed:  Yes Additional Pertinent Hx: 65 YO F who fell in her bathroom when her right leg gave out causing her to fall onto her left side. Resulted in immediate pain in left leg and inability to ambulate. Brought to [facility] ED by EMS. Imaging revealed left distal femur fracture. PMH significant for chronic back pain, bilateral total knee arthroplasties, & Diabetes. She has chronic right leg pain due to documented L4-5 lumbar stenosis. She is followed by Dr. Rosi Jasso for iron deficiency anemia. She has a history of heterozygous factor V Leiden discovered at time of PE in 2015, was fully anticoagulated for 1 yr, then placed on ASA, but had reaction. She was NOT on any anticoagulation prior to admission. Received a dose or 2 of heparin pre-op. ORIF of the left femur was completed 12/20/2020. Post-op complications include UTI, anemia, and shooting/burning pain involving her right calf. Patient believes this pain is associated with her neuropathic pain from her lumbar stenosis. MD ordered doppler since patient has a hx of DVT. She was due for B12 injection in office next week but hematology decided to administer B 12 on 12/23/20 prior to transfer to ARU. Family / Caregiver Present: No  Referring Practitioner: Dr. Rupal King: \"I'm kind of shaky right now, but I will give it my best shot. \"  General Comment  Comments: Pt found seated in w/c upon PT arrival.  Pt agreeable to therapy session. Pain Screening  Patient Currently in Pain: Yes(Pt reported 8/10 back pain, pt stating that she has already received pain medicine)  Vital Signs  Patient Currently in Pain: Yes(Pt reported 8/10 back pain, pt stating that she has already received pain medicine)       Orientation     Cognition      Objective    AM session: Pt finishing up in the bathroom with PCA when PT arrived. Pt agreeable to therapy  Pt reports that once again the KI is digging into her leg at the top of the brace and at the bottom. The proper fit of the KI is important to maintain correct positioning of fx L LE and to maintain proper skin integrity. Pt is able to instruct another person on aligning the [facility] but is unable to complete on her own. The KI was all of the way down close to the ankle and it would have been ineffective with its alignment. For this reason pt cont to  req additional time prior to walking 2/2 to donning shoes, mask, wrist splint and adjustments with the KI. PT instructed pt to have as many things as possible completed prior to therapy sched treatment time to utilize pt's therapy as effectively as possible. Pt in agreement but was in the bathroom just prior to PT's arrival. KI req adjustments 2 x in which PT fabricated an adhesive padding connected directly to the KI at both the proximal and distal aspects of the KI . PT also utilized stockinette over the top of the KI to minimize slippage   Bed mobility( practiced on bed and on mat )  Supine to sitting: MI req additional time but is now able to maneuver LLE over the EOM and the EOB  Sit to Supine: Supervision( performed on flat bed and on mat table)   Scooting: Modified independent(for repositioning in supine with use of bedrails)  Transfers  Sit to Stand: Supervision(from w/c to RW, inconsistent ability to maintain TTWB LLE)  Stand to sit: Supervision(cues for hand placement, increased time to extend LLE forward to complete)  Stand Pivot Transfers: Supervision(w/c to EOB with RW, able to maintain TTWB LLE)  Ambulation  Ambulation?: Yes  WB Status: LLE with TTWB/ Foot flat WB restrictions per chart review  Ambulation 1  Surface: level tile  Device: Rolling Walker  Other Apparatus: Knee Immobilizer; Left(R wrist splint)  Assistance: Supervision  Quality of Gait: decreased evaristo, step to leading with LLE, able to maintain TTWB LLE, decreased B step length/height, UEs fatiguing with increasing distance  Distance: 22'  Stairs?: No  Wheelchair Activities Wheelchair Size: 20\" cassia height w/c  Wheelchair Cushion: Pressure Relieving  Wheelchair Parts Management: Yes  Left Brakes Level of Assistance: Modified independent  Right Brakes Level of Assistance: Modified independent  Propulsion: Yes  Propulsion 1  Propulsion: Manual  Level: Level Tile  Method: RUE;LUE;RLE(occasional use of RLE with fatigue)  Level of Assistance: Modified independent  Description/ Details: pt propelling forward for tricep strengthening and endurance, backward for bicep strengthening and endurance  Distance: 190' x2        Bed mobility  Sit to Supine: Supervision(HOB slightly elevated, use of bedrails, increased time for LLE placement)  Scooting: Modified independent(for repositioning in supine with use of bedrails)  Transfers  Sit to Stand: Supervision(from w/c to RW, inconsistent ability to maintain TTWB LLE)  Stand to sit: Supervision(cues for hand placement, increased time to extend LLE forward to complete)  Stand Pivot Transfers: Supervision(w/c to EOB with RW, fair ability to maintain TTWB LLE)  Ambulation  Ambulation?: Yes  WB Status: LLE with TTWB/ Foot flat WB restrictions per chart review  Ambulation 1  Surface: level tile  Device: Rolling Walker  Other Apparatus: Knee Immobilizer; Left(R wrist splint)  Assistance: Supervision  Quality of Gait: decreased evaristo, step to leading with LLE, able to maintain TTWB LLE, decreased B step length/height, UEs fatiguing with increasing distance  Distance: 25'  Comments: Distance limited by BUE fatigue. Cues for RW management as pt tends to roll RW too far ahead before next step resulting in decreased ability to utilize BUE. Extended rest break after bout due to reported fatigue.   Stairs/Curb  Stairs?: No  Wheelchair Activities  Wheelchair Size: 20\" cassia height w/c  Wheelchair Cushion: Pressure Relieving  Wheelchair Parts Management: Yes  Left Brakes Level of Assistance: Modified independent Right Brakes Level of Assistance: Modified independent  Propulsion: Yes  Propulsion 1  Propulsion: Manual  Level: Level Tile  Method: RUE;LUE;RLE(occasional use of RLE with fatigue)  Level of Assistance: Modified independent  Description/ Details: pt propelling forward for tricep strengthening and endurance, backward for bicep strengthening and endurance  Distance: 150' forward + 150' backward (increased time due to reported fatigue)                                 G-Code     OutComes Score                                                     AM-PAC Score             Goals  Short term goals  Time Frame for Short term goals: 10 -14 days  Short term goal 1: Ind with bed mobility - ongoing  Short term goal 2: MI with transf with  LRAD maintaining WB precautions - ongoing  Short term goal 3: Pt able to propel w/c  on level surfaces x 300' at a time - ongoing  Short term goal 4: Pt amb with RW ~20' at a time while maintaining WB precautions as outlined by physician - ongoing  Short term goal 5: Pt to amb up and down 2 steps with AD with WB precautions as outlined by physician with CGA - ongoing  Long term goals  Time Frame for Long term goals : STG= LTG  Patient Goals   Patient goals : Get home ASAP. Also \"not to fall again    Plan    Plan  Times per week: 5-7 x week x 90 minutes  Times per day: Twice a day  Current Treatment Recommendations: Strengthening, Transfer Training, Endurance Training, Patient/Caregiver Education & Training, Wheelchair Mobility Training, Pain Management, Balance Training, Gait Training, Functional Mobility Training, Stair training, Safety Education & Training, Positioning  Safety Devices  Type of devices:  All fall risk precautions in place, Bed alarm in place, Call light within reach, Gait belt, Left in bed   First Session Therapy Time:   Individual Concurrent Group Co-treatment   Time In 1055         Time Out 1220         Minutes 85           Second session Therapy Time Individual Concurrent Group Co-treatment   Time In 6373         Time Out 1415         Minutes 30                  Timed Code Treatment Minutes:  82+55    Total Treatment Minutes:  [facility], PT   [address] [facility],[address] ( treating/ documenting  therapist for 1st  session)

## 2021-01-06 NOTE — PROGRESS NOTES
Pt awake in chair eating breakfast. Physical assessment and vital signs as charted. Pt currently rates her pain as a 8 out of 10 on the pain scale. Heat pack applied to Pt's posterior R hip. Call light placed within reach. RN will continue to monitor Pt.

## 2021-01-06 NOTE — PROGRESS NOTES
Shift assessment complete. VSS, A/Ox4, fall precautions in place. Call light in reach, bed alarm set, wheels locked and in lowest position. Pt c/o pain and discomfort in R leg and R side of back. Given Norco prn for pain. Pt currently not c/o any pain.  Will continue to monitor      Vitals:    01/05/21 2130   BP: 108/68   Pulse: 57   Resp: 18   Temp: 98 °F (36.7 °C)   SpO2: 95%

## 2021-01-06 NOTE — PLAN OF CARE
Problem: Pain:  Goal: Pain level will decrease  Description: Pain level will decrease  Outcome: Ongoing  Note: Pt was given Norco prn for pain 8/10. Pt's pain level decreased, pt satisfied with pain medication. will cont to monitor. Problem: Falls - Risk of:  Goal: Will remain free from falls  Description: Will remain free from falls  1/6/2021 0032 by Lan Lundborg, RN  Outcome: Ongoing  Note: Pt has remained free from falls during shift. Call light and belongings within reach. Pt calls out appropriately for needs. Pt is x1 s/p to WC. Fall precautions in place, bed alarm is set, wheels locked and in lowest position. Problem: Skin Integrity:  Goal: Will show no infection signs and symptoms  Description: Will show no infection signs and symptoms  1/6/2021 0032 by Lan Lundborg, RN  Outcome: Ongoing  Note: No s/s of infection. Skin assessed during shift. Clean, dry, intact. Pt has scattered bruising.

## 2021-01-07 LAB
ALBUMIN SERPL-MCNC: 3.5 G/DL (ref 3.4–5)
ANION GAP SERPL CALCULATED.3IONS-SCNC: 10 MMOL/L (ref 3–16)
BUN BLDV-MCNC: 31 MG/DL (ref 7–20)
CALCIUM SERPL-MCNC: 9.2 MG/DL (ref 8.3–10.6)
CHLORIDE BLD-SCNC: 98 MMOL/L (ref 99–110)
CO2: 24 MMOL/L (ref 21–32)
CREAT SERPL-MCNC: 1.1 MG/DL (ref 0.6–1.2)
GFR AFRICAN AMERICAN: 58
GFR NON-AFRICAN AMERICAN: 48
GLUCOSE BLD-MCNC: 133 MG/DL (ref 70–99)
GLUCOSE BLD-MCNC: 156 MG/DL (ref 70–99)
GLUCOSE BLD-MCNC: 171 MG/DL (ref 70–99)
GLUCOSE BLD-MCNC: 194 MG/DL (ref 70–99)
GLUCOSE BLD-MCNC: 196 MG/DL (ref 70–99)
PERFORMED ON: ABNORMAL
PHOSPHORUS: 3.8 MG/DL (ref 2.5–4.9)
POTASSIUM SERPL-SCNC: 4.5 MMOL/L (ref 3.5–5.1)
SODIUM BLD-SCNC: 132 MMOL/L (ref 136–145)

## 2021-01-07 PROCEDURE — 97116 GAIT TRAINING THERAPY: CPT | Performed by: PHYSICAL THERAPIST

## 2021-01-07 PROCEDURE — 6370000000 HC RX 637 (ALT 250 FOR IP): Performed by: PHYSICAL MEDICINE & REHABILITATION

## 2021-01-07 PROCEDURE — 1280000000 HC REHAB R&B

## 2021-01-07 PROCEDURE — 6370000000 HC RX 637 (ALT 250 FOR IP): Performed by: STUDENT IN AN ORGANIZED HEALTH CARE EDUCATION/TRAINING PROGRAM

## 2021-01-07 PROCEDURE — 80069 RENAL FUNCTION PANEL: CPT

## 2021-01-07 PROCEDURE — 97110 THERAPEUTIC EXERCISES: CPT

## 2021-01-07 PROCEDURE — 97530 THERAPEUTIC ACTIVITIES: CPT

## 2021-01-07 PROCEDURE — 36415 COLL VENOUS BLD VENIPUNCTURE: CPT

## 2021-01-07 PROCEDURE — 97542 WHEELCHAIR MNGMENT TRAINING: CPT | Performed by: PHYSICAL THERAPIST

## 2021-01-07 PROCEDURE — 97530 THERAPEUTIC ACTIVITIES: CPT | Performed by: PHYSICAL THERAPIST

## 2021-01-07 PROCEDURE — 97535 SELF CARE MNGMENT TRAINING: CPT

## 2021-01-07 RX ADMIN — LEVOTHYROXINE SODIUM 125 MCG: 0.03 TABLET ORAL at 06:18

## 2021-01-07 RX ADMIN — APIXABAN 2.5 MG: 5 TABLET, FILM COATED ORAL at 21:48

## 2021-01-07 RX ADMIN — HYDROCODONE BITARTRATE AND ACETAMINOPHEN 2 TABLET: 5; 325 TABLET ORAL at 13:25

## 2021-01-07 RX ADMIN — PANTOPRAZOLE SODIUM 40 MG: 40 TABLET, DELAYED RELEASE ORAL at 06:18

## 2021-01-07 RX ADMIN — CLOPIDOGREL BISULFATE 75 MG: 75 TABLET ORAL at 10:20

## 2021-01-07 RX ADMIN — ALPRAZOLAM 0.5 MG: 0.5 TABLET ORAL at 22:58

## 2021-01-07 RX ADMIN — HYDROCODONE BITARTRATE AND ACETAMINOPHEN 2 TABLET: 5; 325 TABLET ORAL at 06:18

## 2021-01-07 RX ADMIN — METFORMIN HYDROCHLORIDE 1000 MG: 1000 TABLET ORAL at 21:48

## 2021-01-07 RX ADMIN — INSULIN LISPRO 2 UNITS: 100 INJECTION, SOLUTION INTRAVENOUS; SUBCUTANEOUS at 17:17

## 2021-01-07 RX ADMIN — DIPHENOXYLATE HYDROCHLORIDE AND ATROPINE SULFATE 1 TABLET: 2.5; .025 TABLET ORAL at 10:19

## 2021-01-07 RX ADMIN — ALPRAZOLAM 0.5 MG: 0.5 TABLET ORAL at 12:18

## 2021-01-07 RX ADMIN — INSULIN LISPRO 1 UNITS: 100 INJECTION, SOLUTION INTRAVENOUS; SUBCUTANEOUS at 21:53

## 2021-01-07 RX ADMIN — APIXABAN 2.5 MG: 5 TABLET, FILM COATED ORAL at 10:20

## 2021-01-07 RX ADMIN — INSULIN LISPRO 2 UNITS: 100 INJECTION, SOLUTION INTRAVENOUS; SUBCUTANEOUS at 12:11

## 2021-01-07 RX ADMIN — TIZANIDINE 4 MG: 4 TABLET ORAL at 22:58

## 2021-01-07 RX ADMIN — DIPHENOXYLATE HYDROCHLORIDE AND ATROPINE SULFATE 1 TABLET: 2.5; .025 TABLET ORAL at 21:48

## 2021-01-07 RX ADMIN — DICLOFENAC 2 G: 10 GEL TOPICAL at 21:50

## 2021-01-07 RX ADMIN — GABAPENTIN 400 MG: 400 CAPSULE ORAL at 10:20

## 2021-01-07 RX ADMIN — SERTRALINE HYDROCHLORIDE 100 MG: 100 TABLET ORAL at 10:20

## 2021-01-07 RX ADMIN — TRAMADOL HYDROCHLORIDE 50 MG: 50 TABLET, COATED ORAL at 18:32

## 2021-01-07 RX ADMIN — DICLOFENAC 2 G: 10 GEL TOPICAL at 10:24

## 2021-01-07 RX ADMIN — INSULIN LISPRO 2 UNITS: 100 INJECTION, SOLUTION INTRAVENOUS; SUBCUTANEOUS at 10:19

## 2021-01-07 RX ADMIN — METFORMIN HYDROCHLORIDE 500 MG: 500 TABLET ORAL at 10:20

## 2021-01-07 RX ADMIN — METOPROLOL SUCCINATE 50 MG: 50 TABLET, EXTENDED RELEASE ORAL at 10:20

## 2021-01-07 RX ADMIN — GABAPENTIN 400 MG: 400 CAPSULE ORAL at 21:48

## 2021-01-07 RX ADMIN — GABAPENTIN 400 MG: 400 CAPSULE ORAL at 13:25

## 2021-01-07 RX ADMIN — PREDNISONE 10 MG: 10 TABLET ORAL at 10:22

## 2021-01-07 ASSESSMENT — PAIN SCALES - GENERAL
PAINLEVEL_OUTOF10: 0
PAINLEVEL_OUTOF10: 0
PAINLEVEL_OUTOF10: 8
PAINLEVEL_OUTOF10: 6

## 2021-01-07 ASSESSMENT — PAIN DESCRIPTION - PROGRESSION
CLINICAL_PROGRESSION: NOT CHANGED
CLINICAL_PROGRESSION: GRADUALLY IMPROVING

## 2021-01-07 ASSESSMENT — PAIN DESCRIPTION - ORIENTATION: ORIENTATION: RIGHT

## 2021-01-07 ASSESSMENT — PAIN DESCRIPTION - DESCRIPTORS: DESCRIPTORS: ACHING;DISCOMFORT

## 2021-01-07 NOTE — PROGRESS NOTES
Physical Therapy  Facility/Department: [facility] ACUTE REHAB UNIT  Daily Treatment Note  NAME: Santiago Sr  : 1945  MRN: [id number]    Date of Service: 2021    Discharge Recommendations:  Home with assist PRN, Patient would benefit from continued therapy after discharge, Home with Home health PT   PT Equipment Recommendations  Equipment Needed: No  Other: Pt's daughter reported that the family has purchased a transport chair, a w/c and pt already had a  RW. Assessment   Body structures, Functions, Activity limitations: Decreased functional mobility ; Decreased ADL status; Decreased endurance;Decreased strength;Decreased balance; Increased pain;Decreased ROM  Assessment: Pt has progressed well in therapy achieving4/5 previously set goals. Pain had been the most limiting factor but now appears at a level that pt is able to function better than previously. AM session, pt reported pain was controlled. PM session, pt c/o B shld pain with R> L. Pt is functioning below baseline level and will continue to benefit from skilled physical therapy services to address limitations and maximize potential towards ind  Treatment Diagnosis: Decreased functional mobility 2/2 to distal femur fx  Prognosis: Good  Decision Making: Medium Complexity  PT Education: Transfer Training;Energy Conservation; Functional Mobility Training;General Safety;Weight-bearing Education;Precautions;Gait Training; Adaptive Device Training  Barriers to Learning: None  REQUIRES PT FOLLOW UP: Yes  Activity Tolerance  Activity Tolerance: Patient limited by fatigue;Patient limited by pain; Patient limited by endurance     Patient Diagnosis(es): There were no encounter diagnoses. has a past medical history of Allergic, Anemia, Arthritis, Chronic kidney disease, Depression, Diabetes mellitus ([address] Disorders affecting multiple structures of eye, Epigastric pain, GERD (gastroesophageal reflux disease), Hyperlipidemia, Hypertension, IBS (irritable bowel syndrome), Intermittent atrial fibrillation ([address] Mass of lung, Migraine, Nausea & vomiting, Status post lung surgery, and Thyroid disease. has a past surgical history that includes brain surgery; joint replacement; Cholecystectomy; Bunionectomy; Hysterectomy; thoracotomy (3/10/11); Endoscopy, colon, diagnostic (8/22/2011); Appendectomy; colectomy; Cataract removal (2012); and knee surgery (Left, 10/11/2016). Restrictions  Restrictions/Precautions  Restrictions/Precautions: Weight Bearing  Required Braces or Orthoses?: Yes  Lower Extremity Weight Bearing Restrictions  Left Lower Extremity Weight Bearing: Foot Flat Weight Bearing  Required Braces or Orthoses  Left Lower Extremity Brace: Knee Immobilizer  Position Activity Restriction  Other position/activity restrictions: L TTWB and foot flat WB listed, L knee immobilizer on at all times. As of 12/31/2020, pt is allowed to be untetherered from wall in the w/c on the unit  Subjective   General  Chart Reviewed:  Yes Additional Pertinent Hx: 67 YO F who fell in her bathroom when her right leg gave out causing her to fall onto her left side. Resulted in immediate pain in left leg and inability to ambulate. Brought to [facility] ED by EMS. Imaging revealed left distal femur fracture. PMH significant for chronic back pain, bilateral total knee arthroplasties, & Diabetes. She has chronic right leg pain due to documented L4-5 lumbar stenosis. She is followed by Dr. Don Jorgensen for iron deficiency anemia. She has a history of heterozygous factor V Leiden discovered at time of PE in 2015, was fully anticoagulated for 1 yr, then placed on ASA, but had reaction. She was NOT on any anticoagulation prior to admission. Received a dose or 2 of heparin pre-op. ORIF of the left femur was completed 12/20/2020. Post-op complications include UTI, anemia, and shooting/burning pain involving her right calf. Patient believes this pain is associated with her neuropathic pain from her lumbar stenosis. MD ordered doppler since patient has a hx of DVT. She was due for B12 injection in office next week but hematology decided to administer B 12 on 12/23/20 prior to transfer to ARU. Family / Caregiver Present: Yes  Referring Practitioner: Dr. Chanel Learn: Pt reports that she is doing well on this date with min pain at this time  General Comment  Comments: Pt sitting in w/c when PT arrived.  Pt agreeabel to therapy  Pain Screening  Patient Currently in Pain: Denies(received pain meds prior to therapy arrival)  Pain Assessment  Clinical Progression: Not changed  Vital Signs  Patient Currently in Pain: Denies(received pain meds prior to therapy arrival)       Orientation  Orientation  Overall Orientation Status: Within Normal Limits  Cognition      Objective  The [employer] PT family training with patient and pt's daughter this date. PT  Instructing on a  safe application/removal of gait belt, bed mobility, transf( including car transf)  , w/c mobility, and  gt training . Pt's daughter VU   AM session:Transfers  Sit to Stand: Modified independent(from w/c to RW, from car simulator to standing with RW( inconsistent ability to maintain TTWB LLE))  Stand to sit: Modified independent  Car Transfer: Supervision(Instructed pt's daughter with transf. PT demo transf and then had pt practice with her daughter 2x to insure both pt and pt's daughter comfort with transition)  Comment: Note Car simulator placed at height of crossover vehicle to simulate pt's situation  Ambulation  Ambulation?: Yes  WB Status: LLE with TTWB/ Foot flat WB restrictions per chart review  Ambulation 1  Surface: level tile  Device: Rolling Walker  Other Apparatus: Knee Immobilizer; Left(R wrist splint)  Assistance: Supervision  Quality of Gait: decreased evaristo, step to leading with LLE, able to maintain TTWB LLE, decreased B step length/height, UEs fatiguing with increasing distance  Distance: short distances in room for the bathroom, 20'  Comments: Distance limited by BUE fatigue. Cues for RW management as pt tends to roll RW too far ahead before next step resulting in decreased ability to utilize BUE. Extended rest break after bout due to reported fatigue. Stairs/Curb  Stairs?: No  Wheelchair Activities  Wheelchair Size: 20\" cassia height w/c  Wheelchair Cushion: Pressure Relieving  Wheelchair Parts Management: Yes  Left Brakes Level of Assistance: Modified independent  Right Brakes Level of Assistance: Modified independent  Propulsion: Yes  Propulsion 1  Propulsion: Manual  Level of Assistance: Modified independent  Distance: 190' x1, 150' x1  Second session: Pt sitting in w/c when PT arrived. Pt agreeable to therapy .  Pt's daughter present for continued family training in prep for pt's d/c home  Bed mobility Comments: Pt requested to use the restroom. Pt walked with the RW in and out of  bathroom with w/c at the doorway to simulate home situation. . Pt used the GB and was able to pull pants down withS  only. Pt demo Ind with pericare, Pt stood up with use of the GB and was able to manage LB clothing with S only maintaining the WB precautions. Pt used hand wipes to address hand hygiene ( pt reported being extremely fatigued and decline to step towards sink. Pt positioned in bed with call light and phone placed within reach . Bed alarm reactivated                        G-Code     OutComes Score                                                     AM-PAC Score             Goals  Short term goals  Time Frame for Short term goals: 10 -14 days  Short term goal 1: Ind with bed mobility Goal achieved  Short term goal 2: MI with transf with  LRAD maintaining WB precautions -Goal achieved  Short term goal 3: Pt able to propel w/c  on level surfaces x 300' at a time - Goal achieved  Short term goal 4: Pt amb with RW ~20' at a time while maintaining WB precautions as outlined by physician - Goal achieved  Short term goal 5: Pt to amb up and down 2 steps with AD with WB precautions as outlined by physician with CGA - Goal not addressed 2/2 to pt reporting that family now has a ramp to negotiate the steps for entrance to home. Long term goals  Time Frame for Long term goals : STG= LTG  Patient Goals   Patient goals : Get home ASAP. Also \"not to fall again    Plan    Plan Comment: Tentative d/c home tomorrow with family. Home PT to continue to maximize pt's potential  Safety Devices  Type of devices:  All fall risk precautions in place, Bed alarm in place, Call light within reach, Gait belt, Left in bed     Therapy Time   Individual Concurrent Group Co-treatment   Time In 1100         Time Out 1200         Minutes 60           Second Session Therapy Time:   Individual Concurrent Group Co-treatment   Time In 1410 Time Out 1440         Minutes 30           Timed Code Treatment Minutes:  60+30    Total Treatment Minutes:  90     Total Treatment Minutes:      Day Le[facility] PT

## 2021-01-07 NOTE — CARE COORDINATION
Referred to patient for d/c home tomorrow. Spoke to patient and daughter. Both notified and agreeable. Family prefers [address]. Referral made. Patient has all DME. IMM completed. No other needs at this time.   Electronically signed by BERYL Bobo LISW-S on 1/7/2021 at 2:37 PM

## 2021-01-07 NOTE — PROGRESS NOTES
Department of Physical Medicine & Rehabilitation  Progress Note    Patient Identification:  Balwinder Steven  [id number]  : 1945  Admit date: 2020    Chief Complaint: L hip fracture     Subjective:   Patient seen at bedside this morning. Reports sleeping well and feeling better. Still endorses some R hip discomfort. Daughter at bedside and questions were answered. No acute events overnight. ROS: No f/c, n/v, cp     Objective:  Patient Vitals for the past 24 hrs:   BP Temp Temp src Pulse Resp SpO2   21 2100 125/62 97.7 °F (36.5 °C) Oral 64 18 91 %   21 0959 (!) 153/73   78       Const: Alert. No distress, pleasant. HEENT: Normocephalic, atraumatic. Normal sclera/conjunctiva. MMM. CV: Regular rate and rhythm. Resp: No respiratory distress. Lungs CTAB. Abd: Soft, nontender, nondistended, NABS+   Ext: + edema bilaterally L>R  Neuro: Alert, oriented, appropriately interactive. Psych: Cooperative, appropriate mood and affect    Laboratory data: Available via EMR.    Last 24 hour lab  Recent Results (from the past 24 hour(s))   Osmolality    Collection Time: 21 10:51 AM   Result Value Ref Range    Osmolality 280 278 - 305 mOsm/kg   Osmolality, Urine    Collection Time: 21 11:42 AM   Result Value Ref Range    Osmolality, Ur 286 (L) 390 - 1070 mOsm/kg   Sodium, urine, random    Collection Time: 21 11:42 AM   Result Value Ref Range    Sodium, Ur 58 Not Established mmol/L   Creatinine, Random Urine    Collection Time: 21 11:42 AM   Result Value Ref Range    Creatinine, Ur 29.5 28.0 - 259.0 mg/dL   POCT Glucose    Collection Time: 21 11:47 AM   Result Value Ref Range    POC Glucose 200 (H) 70 - 99 mg/dl    Performed on ACCU-CHEK    POCT Glucose    Collection Time: 21  4:34 PM   Result Value Ref Range    POC Glucose 149 (H) 70 - 99 mg/dl    Performed on ACCU-CHEK    POCT Glucose    Collection Time: 21  9:09 PM   Result Value Ref Range POC Glucose 155 (H) 70 - 99 mg/dl    Performed on ACCU-CHEK    Renal Function Panel    Collection Time: 01/07/21  5:48 AM   Result Value Ref Range    Sodium 132 (L) 136 - 145 mmol/L    Potassium 4.5 3.5 - 5.1 mmol/L    Chloride 98 (L) 99 - 110 mmol/L    CO2 24 21 - 32 mmol/L    Anion Gap 10 3 - 16    Glucose 133 (H) 70 - 99 mg/dL    BUN 31 (H) 7 - 20 mg/dL    CREATININE 1.1 0.6 - 1.2 mg/dL    GFR Non- 48 (A) >60    GFR  58 (A) >60    Calcium 9.2 8.3 - 10.6 mg/dL    Phosphorus 3.8 2.5 - 4.9 mg/dL    Alb 3.5 3.4 - 5.0 g/dL   POCT Glucose    Collection Time: 01/07/21  7:29 AM   Result Value Ref Range    POC Glucose 196 (H) 70 - 99 mg/dl    Performed on ACCU-CHEK        Therapy progress:  PT  Required Braces or Orthoses  Left Lower Extremity Brace: Knee Immobilizer  Position Activity Restriction  Other position/activity restrictions: L TTWB and foot flat WB listed, L knee immobilizer on at all times. As of 12/31/2020, pt is allowed to be untetherered from wall in the w/c on the unit  Objective     Sit to Stand: Supervision(from w/c to RW, inconsistent ability to maintain TTWB LLE)  Stand to sit: Supervision(cues for hand placement, increased time to extend LLE forward to complete)  Device: Rolling Walker  Other Apparatus: Knee Immobilizer, Left(R wrist splint)  Assistance: Supervision  Distance: 25'  OT  PT Equipment Recommendations  Other: Ongoing assessment at this time  Toilet - Technique: Stand pivot  Equipment Used: Raised toilet seat with rails  Toilet Transfers Comments: Pt practice dry toilet transfer 2x with use of only horizontal GB to simulate home environment setup. Pt educated on improved foot placement for increased success with transfers. Pt completed both transfers with SBA and use of GB while maintaining LLE WBing precaution  Assessment        SLP          Body mass index is 35.65 kg/m².     Assessment and Plan: Linford Meckel will require ongoing medical management and daily physician oversight for the followin. Left femur fracture. - s/p ORIF 2020. Patient will proceed with treatment per orthopedic protocol.   - PT/OT required      2. Diabetes. - home  Metformin started. D/C insulin     3. Coagulopathy. (heterozygous factor V Leiden deficiency). - continue Eliquis 2.5 mg by mouth twice a day per hematology recommendations.     4. Coronary artery disease.   - continue Clopidogrel      5. UTI. Patient's preprocedure urinalysis is consistent with urinary tract infection, greater than 100,000 CFU Klebsiella pneumonia.   - s/p Rocephin  - resolved    6. Normocytic anemia. Hemoglobin on admission was 11.8.   - Hgb stable 8s    7. Multi-level Degenerative Disc Disease  - Lumbar MRI on 20 with L4-5 circumferential disc bulge with a right synovial facet cyst in the posterior right epidural space resulting in severe stenosis of the right subarticular zone/lateral recess, with mass effect on the descending right nerve roots  - Ortho consulted and recs appreciated  - cont Prednisone taper    8. Hyponatremia  - fluid restriction  - nephrology following      Rehab Progress: Improving  Anticipated Dispo: home  Services/DME: REFUGIO  ELOS: REFUGIO Tamez MD  PGY-1    Kathleen Terrazas D.O. M.P.H  PM&R  2021  9:47 AM

## 2021-01-07 NOTE — PROGRESS NOTES
Occupational Therapy  Facility/Department: [facility] [address] [facility]  Daily Treatment Note/Discharge Note  NAME: Blair Baron  : 1945  MRN: [id number]    Date of Service: 2021    Discharge Recommendations:  24 hour supervision or assist, Home with Home health OT  OT Equipment Recommendations  Equipment Needed: No    Assessment   Performance deficits / Impairments: Decreased functional mobility ; Decreased endurance;Decreased ADL status; Decreased balance;Decreased strength;Decreased high-level IADLs;Decreased fine motor control  Assessment: Pt met 3/5 LTGs requiring SBA-spvn with toileting, LB dressing and bathing d/t decreased strength, activity tolerance and dynamic balance as a result of TTWB status. Pt mod I for UB bathing and dressing and completing toilet and shower transfers with SBA. Dtr present for family training and feels prepared for Pt d/c tomorrow with 24 hr supervision from [facility]. Treatment Diagnosis: decreased ADL status, functional transfers and functional mobility 2/2 L distal femur fx  Prognosis: Good  OT Education: ADL Adaptive Strategies;Transfer Training;Precautions; Energy Conservation;Plan of Care  Patient Education: d/c planning, family training, BUE HEP-pt and dtr verb understanding  REQUIRES OT FOLLOW UP: Yes  Activity Tolerance  Activity Tolerance: Patient Tolerated treatment well  Safety Devices  Safety Devices in place: Yes  Type of devices: Call light within reach; Chair alarm in place; Left in chair         Patient Diagnosis(es): There were no encounter diagnoses. Subjective: Pt seated in w/c upon arrival, pleasant and agreeable to OT session and excited to see daughter for family training. General Comment  Comments: Bambi Amber   Vital Signs  Patient Currently in Pain: Yes(6/10 R LB/RLE pain)     Orientation  Orientation  Overall Orientation Status: Within Functional Limits  Objective    ADL  Equipment Provided: Reacher  Grooming: Independent(oral hygiene/denture mgmt seated in w/c)  UE Bathing: Modified independent (seated on TTB)  LE Bathing: Stand by assistance(seated on TTB; pericare hygiene completed with lateral WS; drying periarea in stance with unilateral UE support at GB)  UE Dressing: Modified independent (pt donned bra and shirt seated on TTB; pt i'ly retrieved items from w/c level)  LE Dressing: Stand by assistance(doff/don underwear and pants using reacher, CGA steadying assist when in stance to don over hips with intermittent unilateral UE support on GB)  Toileting: Stand by assistance(simulated; no pericare hygiene, pt completed LB clothing mgmt in stance with unilateral UE suport at GB)  Additional Comments: Pt donned tenso shape and R shoe i'ly with + time to manage compression sock over toes; daughter present for ADL and provided VCs for setup using stool to prop LLE in extension during shower and on toilet for comfort as well as technique to use trash bag and tape to water proof the knee immobilizer        Balance  Sitting Balance: Modified independent (seated on TTB)  Standing Balance: Stand by assistance  Standing Balance  Time: ~4  minutes total  Activity: LB ADLs, functional mobility to/from bathroom using RW, functional transfers  Functional Mobility  Functional - Mobility Device: Rolling Walker  Activity: To/from bathroom  Assist Level: Stand by assistance  Functional Mobility Comments: 8 feet to/from bathroom to complete toilet transfer as home bathroom is not w/c accessible for toilet transfer; occasional cues for placement of LLE, pt able to maintain TTWB Toilet Transfers  Toilet - Technique: Ambulating  Equipment Used: Raised toilet seat with rails  Toilet Transfer: Stand by assistance  Toilet Transfers Comments: VCs for placement of LLE when turning and prior to descent to toilet for safe proximity  Shower Transfers  Shower - Transfer From: Wheelchair  Shower - Transfer Type: To and From  Shower - Transfer To: Transfer tub bench  Shower - Technique: Lateral  Shower Transfers: Stand by assistance     Transfers  Sit to stand: Supervision  Stand to sit: Supervision                       Cognition  Overall Cognitive Status: [facility]              Additional Activities Comment  Additional Activities: Family Conference  Additional Activities: Pt and daughter provided family training for increase safety and independence upon d/c home tomorrow, daughter instructed on shower transfer.  toilet transfer, water proofing technique for knee immobilizer for bathing tasks, functional mobility using RW, and provided demonstration and step by step VCs for BUE HEP using theraband and reference to handout-pt daughter verb understanding and all questions clarified                          Plan   Plan  Times per week: 90 minutes per day 5 days/week  Times per day: Daily  Current Treatment Recommendations: Strengthening, Patient/Caregiver Education & Training, Home Management Training, Equipment Evaluation, Education, & procurement, Balance Training, Functional Mobility Training, Endurance Training, Safety Education & Training, Self-Care / ADL, Wheelchair Mobility Training, Pain Management, Positioning  G-Code     OutComes Score                                                  AM-PAC Score             Goals  Short term goals  Time Frame for Short term goals: STG=LTG  Long term goals  Time Frame for Long term goals : 2 weeks  Long term goal 1: Pt will complete LB dressing with use of AE prn with spvn-goal met 1/7  Long term goal 2: Pt will complete UB dressing with mod I-goal met 1/7 Long term goal 3: Pt will complete bathing with spvn-goal met 1/7  Long term goal 4: Pt will complete toilet transfer with spvn maintaining weightbearing restrictions-not met 1/7  Long term goal 5: Pt will complete toileting with spvn-not met  Patient Goals   Patient goals :  To be more independent       Therapy Time   Individual Concurrent Group Co-treatment   Time In 0830         Time Out 1000         Minutes 600 [address]

## 2021-01-07 NOTE — PLAN OF CARE
Problem: Falls - Risk of:  Goal: Will remain free from falls  Description: Will remain free from falls  Outcome: Ongoing  Note: Pt has remained free from falls during shift. Pt calls out appropriately for needs, call light and belongings within reach. Fall precautions in place. Bed alarm is set. Will cont to monitor     Problem: Skin Integrity:  Goal: Absence of new skin breakdown  Description: Absence of new skin breakdown  Outcome: Ongoing  Note: No new skin breakdown.

## 2021-01-07 NOTE — PROGRESS NOTES
Shift assessment complete. VSS, A/Ox4, fall precautions in place. Call light in reach. Pt c/o 7/10 pain or discomfort R leg and R lower back. Was given prn tramadol for pain.  Will continue to monitor      Vitals:    01/06/21 2100   BP: 125/62   Pulse: 64   Resp: 18   Temp: 97.7 °F (36.5 °C)   SpO2: 91%

## 2021-01-07 NOTE — PROGRESS NOTES
NUTRITION ASSESSMENT  Admission Date: 12/23/2020     Type and Reason for Visit: Reassess    NUTRITION RECOMMENDATIONS:   1. PO Diet: Continue current CC5 diet    2. ONS: Provided per family as PRN. Ensure Clear qd  3. Encourage po intakes     NUTRITION ASSESSMENT:  Pt remains nutritionally compromised AEB po intakes ~50% of meals. Pt voices that she has not been eating great. RD encouraged po intakes. Pt to d/c tomorrow. Discussed with pt to consume 1-2 ONS at home to aid in nutritional support, family has been providing some ONS during ARU stay. Pt voiced understanding. Will continue to encourage po intakes this admission. MALNUTRITION ASSESSMENT  Context of Malnutrition: Acute Illness   Malnutrition Status: At risk for malnutrition (Comment)  Findings of the 6 clinical characteristics of malnutrition (Minimum of 2 out of 6 clinical characteristics is required to make the diagnosis of moderate or severe Protein Calorie Malnutrition based on AND/ASPEN Guidelines):  Energy Intake: Less than/equal to 75% of estimated energy requirements    Energy Intake Time: Greater than or equal to 7 days    Weight Loss %: Unable to assess    Weight loss Time: Greater than or equal to 1 year   Due to current CDC guidelines recommending 6-ft distancing for social isolation for COVID19 prevention, physical aspects of the malnutrition assessment were withheld at this time.      NUTRITION DIAGNOSIS   Problem: Problem #1: Inadequate oral intake  Etiology: Decreased ability to consume sufficient energy   Signs & Symptoms: Diet history of poor intake , Intake 0-25% and Intake 26-50%    NUTRITION INTERVENTION  Food and/or Nutrient Delivery:Continue Current diet  or Continue Current ONS   Nutrition education/counseling/coordination of care: Continue Inpatient Monitoring     NUTRITION MONITORING & EVALUATION:  Evaluation:No progress towards goal   Goals:Goals: Pt to meet >50% of nutritional requirements from diet and ONS Monitoring: Meal Intake , Pertinent Labs  or Supplement Intake      OBJECTIVE DATA:  · Nutrition-Focused Physical Findings: + 1 BLE edema,  LBM 1/5 (loose/ watery), NA=870 mg/dL  · Wounds Surgical Wound      Past Medical History:   Diagnosis Date    Allergic     Anemia     Arthritis     Chronic kidney disease     Depression     Diabetes mellitus (HCC)     Disorders affecting multiple structures of eye     Epigastric pain     GERD (gastroesophageal reflux disease)     Hyperlipidemia     Hypertension     IBS (irritable bowel syndrome)     Intermittent atrial fibrillation ([address] 3/12/2011    Mass of lung     right    Migraine     Nausea & vomiting     Status post lung surgery 3/10/2011    Thyroid disease         ANTHROPOMETRICS  Current Height: 5' 2\" (157.5 cm)  Current Weight: 194 lb 14.2 oz (88.4 kg)    Admission weight: 182 lb (82.6 kg)(per pt)  Ideal Bodyweight 110 lb/ 50 kg   Usual Bodyweight JOANIE   Adjusted Bodyweight n/a  Weight Changes JOANIE       BMI BMI (Calculated): 35.7    Wt Readings from Last 50 Encounters:   12/23/20 182 lb (82.6 kg)   08/14/17 166 lb (75.3 kg)       COMPARATIVE STANDARDS  Estimated Total Kcals/Day : 15-18 Current Bodyweight (82.6 kg) 9207-9858 kcal    Estimated Total Protein (g/day) : 1.2-1.4 Ideal Bodyweight  (50 kg) 60-70 g/day  Estimated Daily Total Fluid (ml/day): >1500 mL per day     Food / Nutrition-Related History  Pre-Admission / Home Diet:  Pre-Admission/Home Diet: General   Home Supplements / Herbals:    none noted  Food Restrictions / Cultural Requests:    none noted    Current Nutrition Therapies   Dietary Nutrition Supplements: Clear Liquid Oral Supplement  DIET CARB CONTROL; Carb Control: 5 carb choices (75 gms)/meal; Daily Fluid Restriction: 1200 ml     PO Intake: 1-25% and 26-50%  PO Supplement: From home   PO Supplement Intake: %  IVF: none    NUTRITION RISK LEVEL: Risk Level:  Moderate     Zo Conley LD  [place]:  [phone] Office:  [phone]

## 2021-01-08 VITALS
HEART RATE: 77 BPM | DIASTOLIC BLOOD PRESSURE: 80 MMHG | OXYGEN SATURATION: 98 % | WEIGHT: 194.89 LBS | SYSTOLIC BLOOD PRESSURE: 161 MMHG | RESPIRATION RATE: 18 BRPM | BODY MASS INDEX: 35.86 KG/M2 | TEMPERATURE: 98 F | HEIGHT: 62 IN

## 2021-01-08 LAB
ALBUMIN SERPL-MCNC: 3.9 G/DL (ref 3.4–5)
ANION GAP SERPL CALCULATED.3IONS-SCNC: 12 MMOL/L (ref 3–16)
BASOPHILS ABSOLUTE: 0.1 K/UL (ref 0–0.2)
BASOPHILS RELATIVE PERCENT: 1.1 %
BUN BLDV-MCNC: 22 MG/DL (ref 7–20)
CALCIUM SERPL-MCNC: 9.4 MG/DL (ref 8.3–10.6)
CHLORIDE BLD-SCNC: 97 MMOL/L (ref 99–110)
CO2: 24 MMOL/L (ref 21–32)
CREAT SERPL-MCNC: 1.2 MG/DL (ref 0.6–1.2)
EOSINOPHILS ABSOLUTE: 0.4 K/UL (ref 0–0.6)
EOSINOPHILS RELATIVE PERCENT: 4.7 %
GFR AFRICAN AMERICAN: 53
GFR NON-AFRICAN AMERICAN: 43
GLUCOSE BLD-MCNC: 124 MG/DL (ref 70–99)
GLUCOSE BLD-MCNC: 140 MG/DL (ref 70–99)
HCT VFR BLD CALC: 29.3 % (ref 36–48)
HEMOGLOBIN: 9.5 G/DL (ref 12–16)
LYMPHOCYTES ABSOLUTE: 2.5 K/UL (ref 1–5.1)
LYMPHOCYTES RELATIVE PERCENT: 30.7 %
MCH RBC QN AUTO: 30.1 PG (ref 26–34)
MCHC RBC AUTO-ENTMCNC: 32.3 G/DL (ref 31–36)
MCV RBC AUTO: 93.2 FL (ref 80–100)
MONOCYTES ABSOLUTE: 1.6 K/UL (ref 0–1.3)
MONOCYTES RELATIVE PERCENT: 18.8 %
NEUTROPHILS ABSOLUTE: 3.7 K/UL (ref 1.7–7.7)
NEUTROPHILS RELATIVE PERCENT: 44.7 %
PDW BLD-RTO: 17.4 % (ref 12.4–15.4)
PERFORMED ON: ABNORMAL
PHOSPHORUS: 3.4 MG/DL (ref 2.5–4.9)
PLATELET # BLD: 464 K/UL (ref 135–450)
PMV BLD AUTO: 8.1 FL (ref 5–10.5)
POTASSIUM SERPL-SCNC: 4.1 MMOL/L (ref 3.5–5.1)
RBC # BLD: 3.15 M/UL (ref 4–5.2)
SODIUM BLD-SCNC: 133 MMOL/L (ref 136–145)
WBC # BLD: 8.3 K/UL (ref 4–11)

## 2021-01-08 PROCEDURE — 36415 COLL VENOUS BLD VENIPUNCTURE: CPT

## 2021-01-08 PROCEDURE — 85025 COMPLETE CBC W/AUTO DIFF WBC: CPT

## 2021-01-08 PROCEDURE — 6370000000 HC RX 637 (ALT 250 FOR IP): Performed by: STUDENT IN AN ORGANIZED HEALTH CARE EDUCATION/TRAINING PROGRAM

## 2021-01-08 PROCEDURE — 6370000000 HC RX 637 (ALT 250 FOR IP): Performed by: PHYSICAL MEDICINE & REHABILITATION

## 2021-01-08 PROCEDURE — 80069 RENAL FUNCTION PANEL: CPT

## 2021-01-08 RX ORDER — METOPROLOL SUCCINATE 50 MG/1
50 TABLET, EXTENDED RELEASE ORAL DAILY
Qty: 30 TABLET | Refills: 3 | Status: SHIPPED | OUTPATIENT
Start: 2021-01-08

## 2021-01-08 RX ORDER — CLOPIDOGREL BISULFATE 75 MG/1
75 TABLET ORAL DAILY
Qty: 30 TABLET | Refills: 3 | Status: SHIPPED | OUTPATIENT
Start: 2021-01-08 | End: 2021-01-08

## 2021-01-08 RX ORDER — PREDNISONE 10 MG/1
10 TABLET ORAL DAILY
Qty: 20 TABLET | Refills: 0 | Status: SHIPPED | OUTPATIENT
Start: 2021-01-08 | End: 2021-01-08

## 2021-01-08 RX ORDER — TIZANIDINE 4 MG/1
4 TABLET ORAL EVERY 8 HOURS PRN
Qty: 60 TABLET | Refills: 1 | Status: SHIPPED | OUTPATIENT
Start: 2021-01-08

## 2021-01-08 RX ORDER — POLYETHYLENE GLYCOL 3350 17 G/17G
17 POWDER, FOR SOLUTION ORAL DAILY PRN
Qty: 527 G | Refills: 1 | Status: SHIPPED | OUTPATIENT
Start: 2021-01-08 | End: 2021-01-08

## 2021-01-08 RX ORDER — POLYETHYLENE GLYCOL 3350 17 G/17G
17 POWDER, FOR SOLUTION ORAL DAILY PRN
Qty: 527 G | Refills: 1 | Status: SHIPPED | OUTPATIENT
Start: 2021-01-08 | End: 2021-02-07

## 2021-01-08 RX ORDER — ONDANSETRON 4 MG/1
4 TABLET, ORALLY DISINTEGRATING ORAL EVERY 8 HOURS PRN
Qty: 30 TABLET | Refills: 0 | Status: SHIPPED | OUTPATIENT
Start: 2021-01-08 | End: 2021-01-08

## 2021-01-08 RX ORDER — SENNA AND DOCUSATE SODIUM 50; 8.6 MG/1; MG/1
2 TABLET, FILM COATED ORAL 2 TIMES DAILY PRN
Qty: 30 TABLET | Refills: 1 | Status: SHIPPED | OUTPATIENT
Start: 2021-01-08 | End: 2021-01-08

## 2021-01-08 RX ORDER — SENNA AND DOCUSATE SODIUM 50; 8.6 MG/1; MG/1
2 TABLET, FILM COATED ORAL 2 TIMES DAILY PRN
Qty: 30 TABLET | Refills: 1 | Status: SHIPPED | OUTPATIENT
Start: 2021-01-08

## 2021-01-08 RX ORDER — PREDNISONE 1 MG/1
5 TABLET ORAL DAILY
Qty: 20 TABLET | Refills: 0 | Status: SHIPPED | OUTPATIENT
Start: 2021-01-29 | End: 2021-02-18

## 2021-01-08 RX ORDER — ONDANSETRON 4 MG/1
4 TABLET, ORALLY DISINTEGRATING ORAL EVERY 8 HOURS PRN
Qty: 30 TABLET | Refills: 0 | Status: SHIPPED | OUTPATIENT
Start: 2021-01-08

## 2021-01-08 RX ORDER — TRAMADOL HYDROCHLORIDE 50 MG/1
50 TABLET ORAL EVERY 6 HOURS PRN
Qty: 30 TABLET | Refills: 0 | Status: SHIPPED | OUTPATIENT
Start: 2021-01-08 | End: 2021-01-11

## 2021-01-08 RX ORDER — GABAPENTIN 400 MG/1
400 CAPSULE ORAL 3 TIMES DAILY
Qty: 90 CAPSULE | Refills: 3 | Status: SHIPPED | OUTPATIENT
Start: 2021-01-08 | End: 2021-05-08

## 2021-01-08 RX ORDER — METOPROLOL SUCCINATE 50 MG/1
50 TABLET, EXTENDED RELEASE ORAL DAILY
Qty: 30 TABLET | Refills: 3 | Status: SHIPPED | OUTPATIENT
Start: 2021-01-08 | End: 2021-01-08

## 2021-01-08 RX ORDER — PREDNISONE 10 MG/1
10 TABLET ORAL DAILY
Qty: 20 TABLET | Refills: 0 | Status: SHIPPED | OUTPATIENT
Start: 2021-01-08 | End: 2021-01-28

## 2021-01-08 RX ORDER — ESTRADIOL 0.1 MG/G
2 CREAM VAGINAL
Qty: 1 TUBE | Refills: 3 | Status: SHIPPED | OUTPATIENT
Start: 2021-01-08

## 2021-01-08 RX ORDER — GABAPENTIN 400 MG/1
400 CAPSULE ORAL 3 TIMES DAILY
Qty: 90 CAPSULE | Refills: 3 | Status: SHIPPED | OUTPATIENT
Start: 2021-01-08 | End: 2021-01-08

## 2021-01-08 RX ORDER — TIZANIDINE 4 MG/1
4 TABLET ORAL EVERY 8 HOURS PRN
Qty: 60 TABLET | Refills: 1 | Status: SHIPPED | OUTPATIENT
Start: 2021-01-08 | End: 2021-01-08

## 2021-01-08 RX ORDER — HYDROCORTISONE 25 MG/G
CREAM TOPICAL
Qty: 1 TUBE | Refills: 1 | Status: SHIPPED | OUTPATIENT
Start: 2021-01-08 | End: 2021-01-08

## 2021-01-08 RX ORDER — CLOPIDOGREL BISULFATE 75 MG/1
75 TABLET ORAL DAILY
Qty: 30 TABLET | Refills: 3 | Status: ON HOLD | OUTPATIENT
Start: 2021-01-08 | End: 2021-03-24 | Stop reason: SDUPTHER

## 2021-01-08 RX ORDER — HYDROCORTISONE 25 MG/G
CREAM TOPICAL
Qty: 1 TUBE | Refills: 1 | Status: SHIPPED | OUTPATIENT
Start: 2021-01-08

## 2021-01-08 RX ORDER — ESTRADIOL 0.1 MG/G
2 CREAM VAGINAL
Qty: 1 TUBE | Refills: 3 | Status: SHIPPED | OUTPATIENT
Start: 2021-01-08 | End: 2021-01-08

## 2021-01-08 RX ADMIN — SERTRALINE HYDROCHLORIDE 100 MG: 100 TABLET ORAL at 09:10

## 2021-01-08 RX ADMIN — LEVOTHYROXINE SODIUM 125 MCG: 0.03 TABLET ORAL at 06:45

## 2021-01-08 RX ADMIN — METOPROLOL SUCCINATE 50 MG: 50 TABLET, EXTENDED RELEASE ORAL at 09:10

## 2021-01-08 RX ADMIN — METFORMIN HYDROCHLORIDE 500 MG: 500 TABLET ORAL at 09:10

## 2021-01-08 RX ADMIN — DICLOFENAC 2 G: 10 GEL TOPICAL at 09:13

## 2021-01-08 RX ADMIN — INSULIN LISPRO 2 UNITS: 100 INJECTION, SOLUTION INTRAVENOUS; SUBCUTANEOUS at 09:10

## 2021-01-08 RX ADMIN — CLOPIDOGREL BISULFATE 75 MG: 75 TABLET ORAL at 09:10

## 2021-01-08 RX ADMIN — HYDROCODONE BITARTRATE AND ACETAMINOPHEN 2 TABLET: 5; 325 TABLET ORAL at 01:21

## 2021-01-08 RX ADMIN — GABAPENTIN 400 MG: 400 CAPSULE ORAL at 09:09

## 2021-01-08 RX ADMIN — PREDNISONE 10 MG: 10 TABLET ORAL at 09:10

## 2021-01-08 RX ADMIN — APIXABAN 2.5 MG: 5 TABLET, FILM COATED ORAL at 09:10

## 2021-01-08 RX ADMIN — PANTOPRAZOLE SODIUM 40 MG: 40 TABLET, DELAYED RELEASE ORAL at 06:46

## 2021-01-08 RX ADMIN — DIPHENOXYLATE HYDROCHLORIDE AND ATROPINE SULFATE 1 TABLET: 2.5; .025 TABLET ORAL at 09:10

## 2021-01-08 ASSESSMENT — PAIN DESCRIPTION - FREQUENCY: FREQUENCY: INTERMITTENT

## 2021-01-08 ASSESSMENT — PAIN SCALES - GENERAL
PAINLEVEL_OUTOF10: 7
PAINLEVEL_OUTOF10: 10

## 2021-01-08 ASSESSMENT — PAIN DESCRIPTION - PROGRESSION: CLINICAL_PROGRESSION: NOT CHANGED

## 2021-01-08 ASSESSMENT — PAIN DESCRIPTION - ORIENTATION
ORIENTATION: RIGHT
ORIENTATION: RIGHT

## 2021-01-08 ASSESSMENT — PAIN DESCRIPTION - ONSET: ONSET: ON-GOING

## 2021-01-08 ASSESSMENT — PAIN DESCRIPTION - PAIN TYPE
TYPE: CHRONIC PAIN
TYPE: CHRONIC PAIN

## 2021-01-08 ASSESSMENT — PAIN DESCRIPTION - DESCRIPTORS: DESCRIPTORS: BURNING;STABBING

## 2021-01-08 ASSESSMENT — PAIN - FUNCTIONAL ASSESSMENT: PAIN_FUNCTIONAL_ASSESSMENT: ACTIVITIES ARE NOT PREVENTED

## 2021-01-08 ASSESSMENT — PAIN DESCRIPTION - LOCATION: LOCATION: BACK;LEG

## 2021-01-08 NOTE — DISCHARGE SUMMARY
Physical Medicine & Rehabilitation  Discharge Summary     Patient Identification:  Tristan Guzmán  : 1945  Admit date: 2020  Discharge date:  21  Attending provider: Kandi Phillips DO        Primary care provider: Jesús Menendez MD     Discharge Diagnoses:   Patient Active Problem List   Diagnosis    [facility]    Nodule of right lung    Diabetes mellitus ([facility]    HTN (hypertension)    GERD (gastroesophageal reflux disease)    Arthritis    Hypothyroidism    Anxiety disorder    Gastroesophageal reflux disease with hiatal hernia    Status post lung surgery    Intermittent atrial fibrillation (HCC)    B12 deficiency    Iron deficiency anemia due to dietary causes    Unspecified adverse effect of other drug, medicinal and biological substance(995.29)    Factor V Leiden ([facility]    Pulmonary embolism ([facility]    Deep vein thrombosis (DVT) ([facility]    Treatment    Iron deficiency    Malabsorption of iron    Closed fracture of distal end of left femur, initial encounter ([facility]       History of Present Illness/Acute Hospital Course: 66year old matteo who fell in her bathroom yesterday morning when her right leg gave out causing her to fall jackson her left side. She had immediate pain in left leg and inability to ambulate so was brought to [facility] ED by EMS. Imaging revealed left distal femur fracture. PMH significant for chronic back pain, bilateral total knee arthroplasties, & Diabetes. She has chronic right leg pain due to documented L4-5 lumbar stenosis. She is followed by Dr. Mariam Leyva for iron deficiency anemia. She has a history of heterozygous factor V Leiden discovered at time of PE in 2015, was fully anticoagulated for 1 yr, then placed on ASA, but had reaction. She was NOT on any anticoagulation prior to admission. Received a dose or 2 of heparin pre-op. ORIF of the left femur was completed 12/20/2020. Post-op complications include UTI, anemia, and shooting/burning pain involving her right calf. Patient believes this pain is associated with her neuropathic pain from her lumbar stenosis. MD ordered doppler since patient has a hx of DVT. She was due for B12 injection in office next week but hematology decided to administer B 12 on 12/23/20 prior to transfer to ARU. Patient has remained on prophylactic Eliquis and venous ultrasound was unremarkable. On the day of DC to ARU she received 1 IV iron sucrose 300 mg prior to transfer. She is planned to receive 2 more doses at rehab.  Patient was evaluated by PT/OT and is currently functioning below her baseline. Prior to admission she was independent with all adls, iadls, and functional mobility without and AD. She is now requiring assist x 2 for functional mobility. Patient is motivated and able to tolerate 3 hrs of therapy per day in inpatient rehab. She is now medically stable for transfer.     Inpatient Rehabilitation Course: Bernadette Mccauley is a 66 y.o. female admitted to inpatient rehabilitation on 12/23/2020 with left femur fx. The patient participated in an aggressive multidisciplinary inpatient rehabilitation program involving 3 hours of therapy per day, at least 5 days per week. Impairments: Decreased functional mobility- right sided hip pain    Medical Management:   1. Left femur fracture. - s/p ORIF 12/21/2020. Patient will proceed with treatment per orthopedic protocol.   - PT/OT required      2. Diabetes. - home  Metformin started. D/C insulin      3. Coagulopathy. (heterozygous factor V Leiden deficiency). - continue Eliquis 2.5 mg by mouth twice a day per hematology recommendations.     4. Coronary artery disease.   - continue Clopidogrel      5. UTI. Patient's preprocedure urinalysis is consistent with urinary tract infection, greater than 100,000 CFU Klebsiella pneumonia.   - s/p Rocephin  - resolved     6. Normocytic anemia. Hemoglobin on admission was 11.8.   - Hgb stable 8s     7. Multi-level Degenerative Disc Disease  - Lumbar MRI on 12/17/20 with L4-5 circumferential disc bulge with a right synovial facet cyst in the posterior right epidural space resulting in severe stenosis of the right subarticular zone/lateral recess, with mass effect on the descending right nerve roots  - Ortho consulted and recs appreciated  - cont Prednisone- follow up with ortho     8. Hyponatremia  - fluid restriction  - nephrology following       Discharge Exam:  Constitutional: Alert, WDWN, Pleasant, no distress  Head: Normocephalic, atruamatic, MMM  Eyes: Conjunctiva noninjected, no icterus, no drainage  Pulm: CTA bilat. Respirations non-labored. CV: No murmurs noted. RRR. Abd: Soft, nontender.  NABS+  Ext: No edema, no varicosities  Neuro: Alert, fully oriented, appropriate   MSK: No joint abnormalities noted       Discharge Functional Status:    Physical therapy:  Bed Mobility: Scooting: Independent Transfers: Sit to Stand: Modified independent(from w/c to RW, from car simulator to standing with RW( inconsistent ability to maintain TTWB LLE))  Stand to sit: Modified independent(cues for hand placement, increased time to extend LLE forward to complete)  Bed to Chair: Modified independent  Squat Pivot Transfers: (w/c<>shower chair req mod A when leading with L side and min A when leading with the R.( shower chair sat higher than the w/c also enabling an easier transf))  Comment: Pt reported that the KI has not moved but she was having \"increased pain at the top of the brace. The longer KI removed and the shorter KI applied . PT  continues to provide pt with education on donning the KI most effectively. This req additional time. Skin check performed in which pt has bruising directly behind the knee and increased redness in midthigh. PT notified nsg regarding change in status. Stockinette placed on outside of the [facility] which appeared effective with minimizing slippage. Since pt is unable to physically burke the KI by herself, the focus is now on pt being able to instruct another person on its application., WB Status: LLE with TTWB/ Foot flat WB restrictions per chart review  Ambulation 1  Surface: level tile  Device: Rolling Walker  Other Apparatus: Knee Immobilizer, Left(R wrist splint)  Assistance: Supervision  Quality of Gait: decreased evaristo, step to leading with LLE, able to maintain TTWB LLE, decreased B step length/height, UEs fatiguing with increasing distance  Gait Deviations: Slow Evaristo, Decreased step length, Decreased step height  Distance: short distances in room for the bathroom, 20'  Comments: Distance limited by BUE fatigue. Cues for RW management as pt tends to roll RW too far ahead before next step resulting in decreased ability to utilize BUE.   Extended rest break after bout due to reported fatigue.,    Mobility:  , PT Equipment Recommendations  Equipment Needed: No Other: Pt's daughter reported that the family has purchased a transport chair, a w/c and pt already had a  RW., Assessment: Pt has progressed well in therapy achieving4/5 previously set goals. Pain had been the most limiting factor but now appears at a level that pt is able to function better than previously. AM session, pt reported pain was controlled. PM session, pt c/o B shld pain with R> L. Pt is functioning below baseline level and will continue to benefit from skilled physical therapy services to address limitations and maximize potential towards ind    Occupational therapy:  ,  , Assessment: Pt met 3/5 LTGs requiring SBA-spvn with toileting, LB dressing and bathing d/t decreased strength, activity tolerance and dynamic balance as a result of TTWB status. Pt mod I for UB bathing and dressing and completing toilet and shower transfers with SBA. Dtr present for family training and feels prepared for Pt d/c tomorrow with 24 hr supervision from family and [address] [place] [address].     Speech therapy:         Significant Diagnostics:   Lab Results   Component Value Date    CREATININE 1.2 01/08/2021    BUN 22 (H) 01/08/2021     (L) 01/08/2021    K 4.1 01/08/2021    CL 97 (L) 01/08/2021    CO2 24 01/08/2021       Lab Results   Component Value Date    WBC 7.8 01/06/2021    HGB 9.5 (L) 01/06/2021    HCT 28.8 (L) 01/06/2021    MCV 91.1 01/06/2021     (H) 01/06/2021       Disposition:  home    Services:PT/OT  DME:walker    Discharge Condition: Stable    Follow-up:  See after visit summary from hospitalization    Discharge Medications:     Medication List      ASK your doctor about these medications    ALPRAZolam 0.5 MG tablet  Commonly known as: XANAX     clopidogrel 75 MG tablet  Commonly known as: PLAVIX     colestipol 1 g tablet  Commonly known as: COLESTID     gabapentin 300 MG capsule  Commonly known as: NEURONTIN     hydroCHLOROthiazide 25 MG tablet  Commonly known as: HYDRODIURIL hyoscyamine 375 MCG extended release tablet  Commonly known as: LEVBID     levothyroxine 125 MCG tablet  Commonly known as: SYNTHROID  Ask about: Which instructions should I use? LOMOTIL PO     meloxicam 7.5 MG tablet  Commonly known as: MOBIC     * metFORMIN 500 MG tablet  Commonly known as: GLUCOPHAGE     * metFORMIN 500 MG tablet  Commonly known as: GLUCOPHAGE     metoprolol succinate 50 MG extended release tablet  Commonly known as: TOPROL XL     Norco 7.5-325 MG per tablet  Generic drug: HYDROcodone-acetaminophen     omeprazole 20 MG delayed release capsule  Commonly known as: PRILOSEC     pravastatin 20 MG tablet  Commonly known as: PRAVACHOL     sertraline 100 MG tablet  Commonly known as: ZOLOFT     tiZANidine 2 MG tablet  Commonly known as: ZANAFLEX     Tylenol PM Extra Strength  MG tablet  Generic drug: diphenhydrAMINE-APAP (sleep)     Vitamin D3 1.25 MG (14485 UT) Caps         * This list has 2 medication(s) that are the same as other medications prescribed for you. Read the directions carefully, and ask your doctor or other care provider to review them with you. I spent over 35 minutes on this discharge encounter between counseling, coordination of care, and medication reconciliation. To comply with [address] [facility] bylaw R.II.4.1:   Discharge order placed in advance to facilitate patients discharge needs.       Tiburcio Rai, DO

## 2021-01-08 NOTE — PROGRESS NOTES
Nephrology Note                                                                                                                                                                                                                                                                                                                                                               Office : [phone]     Fax :[phone]              Patient's Name: Taina Nolasco    Reason for Consult:  hyponatremia  Requesting Physician:  Tiffanie Pepper MD    NA better   Good UO   NO diarrhea     Past Medical History:   Diagnosis Date    Allergic     Anemia     Arthritis     Chronic kidney disease     Depression     [facility]     Disorders affecting multiple structures of eye     Epigastric pain     GERD (gastroesophageal reflux disease)     Hyperlipidemia     Hypertension     IBS (irritable bowel syndrome)     Intermittent atrial fibrillation ([facility] 3/12/2011    Mass of lung     right    Migraine     Nausea & vomiting     Status post lung surgery 3/10/2011    Thyroid disease        Past Surgical History:   Procedure Laterality Date    APPENDECTOMY      BRAIN SURGERY      BUNIONECTOMY      CATARACT REMOVAL  2012    CHOLECYSTECTOMY      COLECTOMY      ENDOSCOPY, COLON, DIAGNOSTIC  8/22/2011    HYSTERECTOMY      JOINT REPLACEMENT      KNEE SURGERY Left 10/11/2016    THORACOTOMY  3/10/11    right thoracotomy;right video assissted thoroscopy biopsey of mediastinal mass for ffrozen  right lower lobe wedge resection for frozen; thymusectomy       Family History   Problem Relation Age of Onset    Elevated Lipids Mother     Coronary Art Dis Mother    Collette Satchel Migraines Mother     Hypertension Mother     Kidney Disease Mother     Diabetes Mother     Stroke Father     Hypertension Father     Cancer Sister         ovarian and breast    Coronary Art Dis Brother     Diabetes Brother     Kidney Disease Sister  Hypertension Sister     Hypertension Brother     Diabetes Sister     Migraines Sister     Migraines Brother     Alcohol Abuse Brother         reports that she has never smoked. She has never used smokeless tobacco. She reports that she does not drink alcohol or use drugs.     Allergies:  Aspirin, Codeine, Other, Oxycodone, Percocet [oxycodone-acetaminophen], Trimethoprim, Vicodin [hydrocodone-acetaminophen], Bactrim, Dilaudid [hydromorphone hcl], and Penicillins    Current Medications:        predniSONE (DELTASONE) tablet 10 mg, Daily      gabapentin (NEURONTIN) capsule 400 mg, TID      tiZANidine (ZANAFLEX) tablet 4 mg, Q8H PRN      ondansetron (ZOFRAN-ODT) disintegrating tablet 4 mg, Q8H PRN      calcium carbonate (TUMS) chewable tablet 500 mg, TID PRN      sennosides-docusate sodium (SENOKOT-S) 8.6-50 MG tablet 2 tablet, BID PRN      insulin lispro (1 Unit Dial) 0-12 Units, TID WC      insulin lispro (1 Unit Dial) 0-6 Units, Nightly      glucose (GLUTOSE) 40 % oral gel 15 g, PRN      dextrose 50 % IV solution, PRN      glucagon (rDNA) injection 1 mg, PRN      dextrose 5 % solution, PRN      HYDROcodone-acetaminophen (NORCO) 5-325 MG per tablet 2 tablet, Q6H PRN      metFORMIN (GLUCOPHAGE) tablet 500 mg, Daily with breakfast      metFORMIN (GLUCOPHAGE) tablet 1,000 mg, Nightly      hydrocortisone (ANUSOL-HC) 2.5 % rectal cream, BID      lactulose (CHRONULAC) 10 GM/15ML solution 20 g, TID PRN      benzocaine-menthol (CEPACOL SORE THROAT) lozenge 1 lozenge, Q2H PRN      bisacodyl (DULCOLAX) suppository 10 mg, Daily PRN      pravastatin (PRAVACHOL) tablet 10 mg, Nightly      diphenoxylate-atropine (LOMOTIL) 2.5-0.025 MG per tablet 1 tablet, BID      apixaban (ELIQUIS) tablet 2.5 mg, BID      traMADol (ULTRAM) tablet 50 mg, Q6H PRN      ALPRAZolam (XANAX) tablet 0.5 mg, BID PRN      clopidogrel (PLAVIX) tablet 75 mg, Daily      cholestyramine light packet 4 g, Daily   diclofenac sodium (VOLTAREN) 1 % gel 2 g, BID      vitamin D (ERGOCALCIFEROL) capsule 50,000 Units, Weekly      estradiol (ESTRACE) vaginal cream 2 g, Once per day on Mon Wed Fri      hyoscyamine (LEVBID) extended release tablet 375 mcg, Q12H PRN      metoprolol succinate (TOPROL XL) extended release tablet 50 mg, Daily      levothyroxine (SYNTHROID) tablet 125 mcg, Daily      pantoprazole (PROTONIX) tablet 40 mg, QAM AC      nitroGLYCERIN (NITROSTAT) SL tablet 0.4 mg, Q5 Min PRN      sertraline (ZOLOFT) tablet 100 mg, Daily      acetaminophen (TYLENOL) tablet 650 mg, Q4H PRN      [Held by provider] bisacodyl (DULCOLAX) EC tablet 5 mg, Daily      magnesium hydroxide (MILK OF MAGNESIA) 400 MG/5ML suspension 30 mL, Daily PRN      polyethylene glycol (GLYCOLAX) packet 17 g, Daily PRN      glucose (GLUTOSE) 40 % oral gel 15 g, PRN      dextrose 50 % IV solution, PRN      glucagon (rDNA) injection 1 mg, PRN      dextrose 5 % solution, PRN        Review of Systems:   14 point ROS obtained but were negative except mentioned in HPI      Physical exam:     Vitals:  BP (!) 161/80   Pulse 77   Temp 98 °F (36.7 °C) (Oral)   Resp 18   Ht 5' 2\" (1.575 m)   Wt 194 lb 14.2 oz (88.4 kg)   SpO2 98%   BMI 35.65 kg/m²   Constitutional:  OAA X3 NAD  Skin: no rash, turgor wnl  Heent:  eomi, mmm  Neck: no bruits or jvd noted  Cardiovascular:  S1, S2 without m/r/g  Respiratory: CTA B without w/r/r  Abdomen:  +bs, soft, nt, nd  Ext: + lower extremity edema, R > L  Psychiatric: mood and affect appropriate  Musculoskeletal:  Rom, muscular strength intact    Data:   Labs:  CBC:   Recent Labs     01/06/21  0645   WBC 7.8   HGB 9.5*   *     BMP:    Recent Labs     01/06/21  0645 01/07/21  0548 01/08/21  0715   * 132* 133*   K 4.1 4.5 4.1   CL 91* 98* 97*   CO2 20* 24 24   BUN 21* 31* 22*   CREATININE 1.0 1.1 1.2   GLUCOSE 111* 133* 124*     Ca/Mg/Phos:   Recent Labs     01/06/21  0645 01/07/21 6713 01/08/21  0715   CALCIUM 9.4 9.2 9.4   PHOS  --  3.8 3.4     Hepatic: No results for input(s): AST, ALT, ALB, BILITOT, ALKPHOS in the last 72 hours. Troponin:   No results for input(s): TROPONINI in the last 72 hours. BNP: No results for input(s): BNP in the last 72 hours. Lipids: No results for input(s): CHOL, TRIG, HDL, LDLCALC, LABVLDL in the last 72 hours. ABGs: No results for input(s): PHART, PO2ART, ZAQ0FUH in the last 72 hours. INR: No results for input(s): INR in the last 72 hours. UA:No results for input(s): Iftikhar Starcher, GLUCOSEU, BILIRUBINUR, Earmon Lyell, BLOODU, PHUR, PROTEINU, UROBILINOGEN, NITRU, LEUKOCYTESUR, LABMICR, URINETYPE in the last 72 hours. Urine Microscopic: No results for input(s): LABCAST, BACTERIA, COMU, HYALCAST, WBCUA, RBCUA, EPIU in the last 72 hours. Urine Culture: No results for input(s): LABURIN in the last 72 hours. Urine Chemistry:   Recent Labs     01/06/21  1142   LABCREA 29.5   NAUR 58             IMAGING:  XR LUMBAR SPINE (2-3 VIEWS)   Final Result   Impression: No acute osseous abnormality. Grade 1 retrolisthesis of L1 on L2. Multilevel spondylosis and facet arthropathy. XR HIP 2-3 VW W PELVIS RIGHT   Final Result   Impression:   1. No acute fracture. 2. Degenerative changes along pubic symphysis. Assessment/Plan   1. 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 HTN    3. Anemia    4. Acid- base/ Electrolyte imbalance     5. UTI    6. Factor V Leiden deficiency    7. DM2     8.  L femur fracture    - Urea packets as needed , increase solute intake (OK to take home protein supplements)  - Fluid restriction   - Stopped HCTZ  - Ur studies - SIADH   - NA better           Job Cornejo

## 2021-01-08 NOTE — PLAN OF CARE
Problem: Pain:  Goal: Control of chronic pain  Description: Control of chronic pain  Outcome: Ongoing  Note: Patient reports low back pain with radiation down right lateral leg. She reports numbness 3 toes on right foot. She also reports left leg surgical pain. Medicated with Norco and Tramadol as ordered. Ice bag in place to back and right leg. Problem: Falls - Risk of:  Goal: Will remain free from falls  Description: Will remain free from falls  Outcome: Ongoing  Note: Patient has called appropriately for assistance to bathroom. Fall precautions in place. Bed is in low and locked position. Bed alarm set. Call light and bedside table within reach. She has remained free from falls. Problem: Skin Integrity:  Goal: Absence of new skin breakdown  Description: Absence of new skin breakdown  Outcome: Ongoing  Note: Skin pink warm and dry. Scattered ecchymotic areas. Immobilizer in place at all times to left leg. Non pitting lower extremity edema noted.  No new skin issues

## 2021-01-08 NOTE — PROGRESS NOTES
Patient has complained of burning and sharp pain on right lower back. Pain is radiating down right leg. She reports numbness on right lateral shin and first 3 toes on right foot. Leg elevated off bed. Patient medicated with Zanaflex and Xanax on request. Ice bags place on right leg and back. Will continue to monitor.

## 2021-01-08 NOTE — PROGRESS NOTES
Nephrology Note                                                                                                                                                                                                                                                                                                                                                               Office : [phone]     Fax :[phone]              Patient's Name: Bernadette Mccauley    Reason for Consult:  hyponatremia  Requesting Physician:  Ayesha Samson MD    NA better   Good UO   NO diarrhea   Pt want to stop fluid restriction     Past Medical History:   Diagnosis Date    Allergic     Anemia     Arthritis     Chronic kidney disease     Depression     Diabetes mellitus ([facility] [address]     Disorders affecting multiple structures of eye     Epigastric pain     GERD (gastroesophageal reflux disease)     Hyperlipidemia     Hypertension     IBS (irritable bowel syndrome)     Intermittent atrial fibrillation ([address] 3/12/2011    Mass of lung     right    Migraine     Nausea & vomiting     Status post lung surgery 3/10/2011    Thyroid disease        Past Surgical History:   Procedure Laterality Date    APPENDECTOMY      BRAIN SURGERY      BUNIONECTOMY      CATARACT REMOVAL  2012    CHOLECYSTECTOMY      COLECTOMY      ENDOSCOPY, COLON, DIAGNOSTIC  8/22/2011    HYSTERECTOMY      JOINT REPLACEMENT      KNEE SURGERY Left 10/11/2016    THORACOTOMY  3/10/11    right thoracotomy;right video assissted thoroscopy biopsey of mediastinal mass for ffrozen  right lower lobe wedge resection for frozen; thymusectomy       Family History   Problem Relation Age of Onset    Elevated Lipids Mother     Coronary Art Dis Mother    [facility] Migraines Mother     Hypertension Mother     Kidney Disease Mother     Diabetes Mother     Stroke Father     Hypertension Father     Cancer Sister         ovarian and breast    Coronary Art Dis Brother  Diabetes Brother     Kidney Disease Sister     Hypertension Sister     Hypertension Brother     Diabetes Sister     Migraines Sister     Migraines Brother     Alcohol Abuse Brother         reports that she has never smoked. She has never used smokeless tobacco. She reports that she does not drink alcohol or use drugs.     Allergies:  Aspirin, Codeine, Other, Oxycodone, Percocet [oxycodone-acetaminophen], Trimethoprim, Vicodin [hydrocodone-acetaminophen], Bactrim, Dilaudid [hydromorphone hcl], and Penicillins    Current Medications:        predniSONE (DELTASONE) tablet 10 mg, Daily      gabapentin (NEURONTIN) capsule 400 mg, TID      tiZANidine (ZANAFLEX) tablet 4 mg, Q8H PRN      ondansetron (ZOFRAN-ODT) disintegrating tablet 4 mg, Q8H PRN      calcium carbonate (TUMS) chewable tablet 500 mg, TID PRN      sennosides-docusate sodium (SENOKOT-S) 8.6-50 MG tablet 2 tablet, BID PRN      insulin lispro (1 Unit Dial) 0-12 Units, TID WC      insulin lispro (1 Unit Dial) 0-6 Units, Nightly      glucose (GLUTOSE) 40 % oral gel 15 g, PRN      dextrose 50 % IV solution, PRN      glucagon (rDNA) injection 1 mg, PRN      dextrose 5 % solution, PRN      HYDROcodone-acetaminophen (NORCO) 5-325 MG per tablet 2 tablet, Q6H PRN      metFORMIN (GLUCOPHAGE) tablet 500 mg, Daily with breakfast      metFORMIN (GLUCOPHAGE) tablet 1,000 mg, Nightly      hydrocortisone (ANUSOL-HC) 2.5 % rectal cream, BID      lactulose (CHRONULAC) 10 GM/15ML solution 20 g, TID PRN      benzocaine-menthol (CEPACOL SORE THROAT) lozenge 1 lozenge, Q2H PRN      bisacodyl (DULCOLAX) suppository 10 mg, Daily PRN      pravastatin (PRAVACHOL) tablet 10 mg, Nightly      diphenoxylate-atropine (LOMOTIL) 2.5-0.025 MG per tablet 1 tablet, BID      apixaban (ELIQUIS) tablet 2.5 mg, BID      traMADol (ULTRAM) tablet 50 mg, Q6H PRN      ALPRAZolam (XANAX) tablet 0.5 mg, BID PRN      clopidogrel (PLAVIX) tablet 75 mg, Daily   cholestyramine light packet 4 g, Daily      diclofenac sodium (VOLTAREN) 1 % gel 2 g, BID      vitamin D (ERGOCALCIFEROL) capsule 50,000 Units, Weekly      estradiol (ESTRACE) vaginal cream 2 g, Once per day on Mon Wed Fri      hyoscyamine (LEVBID) extended release tablet 375 mcg, Q12H PRN      metoprolol succinate (TOPROL XL) extended release tablet 50 mg, Daily      levothyroxine (SYNTHROID) tablet 125 mcg, Daily      pantoprazole (PROTONIX) tablet 40 mg, QAM AC      nitroGLYCERIN (NITROSTAT) SL tablet 0.4 mg, Q5 Min PRN      sertraline (ZOLOFT) tablet 100 mg, Daily      acetaminophen (TYLENOL) tablet 650 mg, Q4H PRN      [Held by provider] bisacodyl (DULCOLAX) EC tablet 5 mg, Daily      magnesium hydroxide (MILK OF MAGNESIA) 400 MG/5ML suspension 30 mL, Daily PRN      polyethylene glycol (GLYCOLAX) packet 17 g, Daily PRN      glucose (GLUTOSE) 40 % oral gel 15 g, PRN      dextrose 50 % IV solution, PRN      glucagon (rDNA) injection 1 mg, PRN      dextrose 5 % solution, PRN        Review of Systems:   14 point ROS obtained but were negative except mentioned in HPI      Physical exam:     Vitals:  BP (!) 141/95   Pulse 81   Temp 98.2 °F (36.8 °C) (Oral)   Resp 18   Ht 5' 2\" (1.575 m)   Wt 194 lb 14.2 oz (88.4 kg)   SpO2 94%   BMI 35.65 kg/m²   Constitutional:  OAA X3 NAD  Skin: no rash, turgor wnl  Heent:  eomi, mmm  Neck: no bruits or jvd noted  Cardiovascular:  S1, S2 without m/r/g  Respiratory: CTA B without w/r/r  Abdomen:  +bs, soft, nt, nd  Ext: + lower extremity edema, R > L  Psychiatric: mood and affect appropriate  Musculoskeletal:  Rom, muscular strength intact    Data:   Labs:  CBC:   Recent Labs     01/06/21  0645   WBC 7.8   HGB 9.5*   *     BMP:    Recent Labs     01/06/21  0645 01/07/21  0548   * 132*   K 4.1 4.5   CL 91* 98*   CO2 20* 24   BUN 21* 31*   CREATININE 1.0 1.1   GLUCOSE 111* 133*     Ca/Mg/Phos:   Recent Labs     01/06/21  0645 01/07/21  0548

## 2021-02-22 ENCOUNTER — OFFICE VISIT (OUTPATIENT)
Dept: ORTHOPEDIC SURGERY | Age: 76
End: 2021-02-22
Payer: MEDICARE

## 2021-02-22 VITALS — BODY MASS INDEX: 35.7 KG/M2 | HEIGHT: 62 IN | WEIGHT: 194 LBS

## 2021-02-22 DIAGNOSIS — M48.062 LUMBAR STENOSIS WITH NEUROGENIC CLAUDICATION: Primary | ICD-10-CM

## 2021-02-22 PROCEDURE — G8428 CUR MEDS NOT DOCUMENT: HCPCS | Performed by: PHYSICIAN ASSISTANT

## 2021-02-22 PROCEDURE — 1090F PRES/ABSN URINE INCON ASSESS: CPT | Performed by: PHYSICIAN ASSISTANT

## 2021-02-22 PROCEDURE — G8484 FLU IMMUNIZE NO ADMIN: HCPCS | Performed by: PHYSICIAN ASSISTANT

## 2021-02-22 PROCEDURE — 99203 OFFICE O/P NEW LOW 30 MIN: CPT | Performed by: PHYSICIAN ASSISTANT

## 2021-02-22 PROCEDURE — G8417 CALC BMI ABV UP PARAM F/U: HCPCS | Performed by: PHYSICIAN ASSISTANT

## 2021-02-22 NOTE — PROGRESS NOTES
New Patient consult: LUMBAR SPINE    Referring Provider: Liam Arellano MD  CHIEF COMPLAINT:    Chief Complaint   Patient presents with    Back Problem       HISTORY OF PRESENT ILLNESS:       Ms. Sherrill Shelley  is a pleasant 66 y.o. female here for consultation regarding her LBP and right leg pain. Patient was referred by her primary care physician Liam Arellano MD.  She states her pain began insidiously about several years ago but more recently in December 2020. Patient states that her pain changed in December to the point where she was having sharp radicular pain into her right leg causing weakness. This caused her leg to give out and she fell sustaining a left femur fracture which required an ORIF at the end of December. She is now in a brace on the left and she is nonweightbearing. She states at the present time her pain continues to be right low back with radiation into the lateral thigh into the lateral calf. She does have some posterior thigh pain. She has weakness with motion and decreased sensation in an L5-S1 distribution. She is presently in pain management and takes 7.5mg of Norco 3 times a day along with Zanaflex and 600 mg of gabapentin 3 times a day. She has had previous SI injections, RFA treatments in the past.  She did have an epidural steroid injection on 2/4/2021 .      Current/Past Treatment:   · Physical Therapy: None recently  · Chiropractic: None  · Injection: TEMITOPE on 2/4/2021  · Medications: Norco, Zanaflex, gabapentin    Past Medical History:   Past Medical History:   Diagnosis Date    Allergic     Anemia     Arthritis     Chronic kidney disease     Depression     Diabetes mellitus ([address]     Disorders affecting multiple structures of eye     Epigastric pain     GERD (gastroesophageal reflux disease)     Hyperlipidemia     Hypertension     IBS (irritable bowel syndrome)     Intermittent atrial fibrillation ([address] 3/12/2011    Mass of lung     right    Migraine     Nausea & vomiting     Status post lung surgery 3/10/2011    Thyroid disease         Past Surgical History:     Past Surgical History:   Procedure Laterality Date    APPENDECTOMY      BRAIN SURGERY      BUNIONECTOMY      CATARACT REMOVAL  2012    CHOLECYSTECTOMY      COLECTOMY      ENDOSCOPY, COLON, DIAGNOSTIC  8/22/2011    HYSTERECTOMY      JOINT REPLACEMENT      KNEE SURGERY Left 10/11/2016    THORACOTOMY  3/10/11    right thoracotomy;right video assissted thoroscopy biopsey of mediastinal mass for ffrozen  right lower lobe wedge resection for frozen; thymusectomy       Current Medications:     Current Outpatient Medications:     hydrocortisone (ANUSOL-HC) 2.5 % CREA rectal cream, Apply  BID, Disp: 1 Tube, Rfl: 1    apixaban (ELIQUIS) 2.5 MG TABS tablet, Take 1 tablet by mouth 2 times daily, Disp: 60 tablet, Rfl: 1    gabapentin (NEURONTIN) 400 MG capsule, Take 1 capsule by mouth 3 times daily for 120 days. , Disp: 90 capsule, Rfl: 3    metFORMIN (GLUCOPHAGE) 500 MG tablet, Take 1 tablet by mouth daily (with breakfast), Disp: 60 tablet, Rfl: 3    metFORMIN (GLUCOPHAGE) 1000 MG tablet, Take 1 tablet by mouth nightly, Disp: 60 tablet, Rfl: 3    ondansetron (ZOFRAN-ODT) 4 MG disintegrating tablet, Take 1 tablet by mouth every 8 hours as needed for Nausea or Vomiting, Disp: 30 tablet, Rfl: 0    metoprolol succinate (TOPROL XL) 50 MG extended release tablet, Take 1 tablet by mouth daily, Disp: 30 tablet, Rfl: 3    diclofenac sodium (VOLTAREN) 1 % GEL, Apply 2 g topically 2 times daily, Disp: 100 g, Rfl: 1    sennosides-docusate sodium (SENOKOT-S) 8.6-50 MG tablet, Take 2 tablets by mouth 2 times daily as needed for Constipation, Disp: 30 tablet, Rfl: 1    clopidogrel (PLAVIX) 75 MG tablet, Take 1 tablet by mouth daily, Disp: 30 tablet, Rfl: 3    tiZANidine (ZANAFLEX) 4 MG tablet, Take 1 tablet by mouth every 8 hours as needed (spasticity), Disp: 60 tablet, Rfl: 1    estradiol (ESTRACE) 0.1 MG/GM vaginal cream, Place 2 g vaginally three times a week, Disp: 1 Tube, Rfl: 3    levothyroxine (SYNTHROID) 125 MCG tablet, Take 125 mcg by mouth Daily, Disp: , Rfl:     sertraline (ZOLOFT) 100 MG tablet, Take 100 mg by mouth nightly, Disp: , Rfl:     hyoscyamine (LEVBID) 375 MCG extended release tablet, Take 375 mcg by mouth daily , Disp: , Rfl:     colestipol (COLESTID) 1 G tablet, Take 1 g by mouth 2 times daily, Disp: , Rfl:     pravastatin (PRAVACHOL) 20 MG tablet, Take 20 mg by mouth daily Takes 1/2 tablet=10mg, Disp: , Rfl:     Diphenoxylate-Atropine (LOMOTIL PO), Take by mouth 2 times daily 2.5-0.025 mg tab, Disp: , Rfl:     Cholecalciferol (VITAMIN D3) 40516 UNITS CAPS, Take by mouth Takes 1 on tuesdays and another on saturdays, Disp: , Rfl:     diphenhydrAMINE-APAP, sleep, (TYLENOL PM EXTRA STRENGTH)  MG tablet, Take 1 tablet by mouth nightly as needed for Sleep., Disp: , Rfl:     alprazolam (XANAX) 0.5 MG tablet, Take 0.5 mg by mouth 2 times daily. , Disp: , Rfl:     omeprazole (PRILOSEC) 20 MG capsule, Take 20 mg by mouth daily 40 mg in AM  20 mg at bedtime, Disp: , Rfl:     Allergies:  Aspirin, Codeine, Other, Oxycodone, Percocet [oxycodone-acetaminophen], Trimethoprim, Vicodin [hydrocodone-acetaminophen], Bactrim, Dilaudid [hydromorphone hcl], and Penicillins    Social History:    reports that she has never smoked. She has never used smokeless tobacco. She reports that she does not drink alcohol or use drugs.     Family History:   Family History   Problem Relation Age of Onset    Elevated Lipids Mother     Coronary Art Dis Mother    [facility] Migraines Mother     Hypertension Mother     Kidney Disease Mother     Diabetes Mother     Stroke Father     Hypertension Father     Cancer Sister         ovarian and breast    Coronary Art Dis Brother     Diabetes Brother     Kidney Disease Sister     Hypertension Sister     Hypertension Brother     Diabetes Sister    [facility] Migraines Sister    [facility] Migraines Brother     Alcohol Abuse Brother        REVIEW OF SYSTEMS: Full ROS noted & scanned   CONSTITUTIONAL: Denies unexplained weight loss, fevers, chills or fatigue  NEUROLOGICAL: Denies unsteady gait or progressive weakness  MUSCULOSKELETAL: Denies joint swelling or redness  PSYCHOLOGICAL: Denies anxiety, depression   SKIN: Denies skin changes, delayed healing, rash, itching   HEMATOLOGIC: Denies easy bleeding or bruising  ENDOCRINE: Denies excessive thirst, urination, heat/cold  RESPIRATORY: Denies current dyspnea, cough  GI: Denies nausea, vomiting, diarrhea   : Denies bowel or bladder issues      PHYSICAL EXAM:    Vitals: Height 5' 2\" (1.575 m), weight 194 lb (88 kg). GENERAL EXAM:  · General Apparence: Patient is adequately groomed with no evidence of malnutrition. · Orientation: The patient is oriented to time, place and person. · Mood & Affect:The patient's mood and affect are appropriate. · Vascular: Examination reveals no swelling tenderness in upper or lower extremities. Good capillary refill. · Lymphatic: The lymphatic examination bilaterally reveals all areas to be without enlargement or induration  · Sensation: Sensation is intact without deficit  Coordination/Balance: Patient is nonweightbearing secondary to left femur fracture and transfers with a walker  LUMBAR/SACRAL EXAMINATION: Evaluation was performed with the patient in wheelchair  · Inspection: Local inspection shows no step-off or bruising. Lumbar alignment is normal.  Sagittal and Coronal balance is neutral.      · Palpation:   No evidence of tenderness at the midline. No tenderness bilaterally at the paraspinal or trochanters. There is no step-off or paraspinal spasm. · Range of Motion: Lumbar flexion, extension and rotation are mildly limited due to pain. · Strength:   Strength testing is 4 /5 in all muscle groups tested on the right and 5/5 on the left.    · Special Tests:   Straight leg raise and crossed SLR negative. Leg length and pelvis level. · Skin: There are no rashes, ulcerations or lesions. · Reflexes: Reflexes are symmetrically 2+ at the patellar and ankle tendons. Clonus absent bilaterally at the feet. · Gait & station: Patient is nonweightbearing and transfers with a walker    · Additional Examinations:   · RIGHT LOWER EXTREMITY: Inspection/examination of the right lower extremity does not show any tenderness, deformity or injury. Range of motion is unremarkable. There is no gross instability. There are no rashes, ulcerations or lesions. Strength and tone are normal.  · LEFT LOWER EXTREMITY: Patient of the left lower extremity reveals the patient to be in a fracture brace. 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 The MRI was in the [employer] system we need to review the MRI once we obtain it and we will contact the patient in regards to further treatment options. These might include additional physical therapy, observation, or epidural steroid injections. Follow up -we will contact her after reviewing the MRI. Old records were reviewed. Patient examined and note dictated by Brooke Navarro PA-C. Patient also seen and examined by Dr. Gerardo Whitmore.

## 2021-03-05 ENCOUNTER — TELEPHONE (OUTPATIENT)
Dept: ORTHOPEDIC SURGERY | Age: 76
End: 2021-03-05

## 2021-03-08 ENCOUNTER — OFFICE VISIT (OUTPATIENT)
Dept: ORTHOPEDIC SURGERY | Age: 76
End: 2021-03-08
Payer: MEDICARE

## 2021-03-08 VITALS — BODY MASS INDEX: 35.7 KG/M2 | WEIGHT: 194 LBS | HEIGHT: 62 IN

## 2021-03-08 DIAGNOSIS — M48.062 LUMBAR STENOSIS WITH NEUROGENIC CLAUDICATION: Primary | ICD-10-CM

## 2021-03-08 DIAGNOSIS — M71.38 SYNOVIAL CYST OF LUMBAR FACET JOINT: ICD-10-CM

## 2021-03-08 PROCEDURE — G8427 DOCREV CUR MEDS BY ELIG CLIN: HCPCS | Performed by: PHYSICIAN ASSISTANT

## 2021-03-08 PROCEDURE — G8484 FLU IMMUNIZE NO ADMIN: HCPCS | Performed by: PHYSICIAN ASSISTANT

## 2021-03-08 PROCEDURE — 1090F PRES/ABSN URINE INCON ASSESS: CPT | Performed by: PHYSICIAN ASSISTANT

## 2021-03-08 PROCEDURE — 1036F TOBACCO NON-USER: CPT | Performed by: PHYSICIAN ASSISTANT

## 2021-03-08 PROCEDURE — G8399 PT W/DXA RESULTS DOCUMENT: HCPCS | Performed by: PHYSICIAN ASSISTANT

## 2021-03-08 PROCEDURE — 4040F PNEUMOC VAC/ADMIN/RCVD: CPT | Performed by: PHYSICIAN ASSISTANT

## 2021-03-08 PROCEDURE — 1123F ACP DISCUSS/DSCN MKR DOCD: CPT | Performed by: PHYSICIAN ASSISTANT

## 2021-03-08 PROCEDURE — G8417 CALC BMI ABV UP PARAM F/U: HCPCS | Performed by: PHYSICIAN ASSISTANT

## 2021-03-08 PROCEDURE — 99214 OFFICE O/P EST MOD 30 MIN: CPT | Performed by: PHYSICIAN ASSISTANT

## 2021-03-08 NOTE — PROGRESS NOTES
Follow-up: LUMBAR SPINE    Referring Provider: Elise Vasques MD  CHIEF COMPLAINT:    Low back pain and right leg pain    HISTORY OF PRESENT ILLNESS:       Ms. Balwinder Steven  is a pleasant 66 y.o. female here for follow-up regarding her LBP and right leg pain. Patient was recently seen by her orthopedic surgeon who performed the ORIF on her left femur and she has now 75% weightbearing on the left with a fracture brace. She states her pain began insidiously about several years ago but more recently in December 2020. Patient states that her pain changed in December to the point where she was having sharp radicular pain into her right leg causing weakness. This caused her leg to give out and she fell sustaining a left femur fracture which required an ORIF at the end of December. She is now in a brace on the left and she is nonweightbearing. She states at the present time her pain continues to be right low back with radiation into the lateral thigh into the lateral calf. She does have some posterior thigh pain. She has weakness with motion and decreased sensation in an L5-S1 distribution. She is presently in pain management and takes 7.5mg of Norco 3 times a day along with Zanaflex and 600 mg of gabapentin 3 times a day. She has had previous SI injections, RFA treatments in the past.  She did have an epidural steroid injection on 3/4/2021 .    Pain Assessment  Location of Pain: Back  Location Modifiers: Right  Severity of Pain: 7  Quality of Pain: Dull, Aching  Duration of Pain: Persistent  Frequency of Pain: Constant  Aggravating Factors: Walking, Standing  Limiting Behavior: Yes  Relieving Factors: Rest  Result of Injury: No  Work-Related Injury: No  Are there other pain locations you wish to document?: No]          Current/Past Treatment:   · Physical Therapy: None recently  · Chiropractic: None  · Injection: TEMITOPE on 3/4/2021  · Medications: Zanaflex, gabapentin    Past Medical History:   Past Medical History:   Diagnosis Date    Allergic     Anemia     Arthritis     Chronic kidney disease     Depression     Diabetes mellitus (HCC)     Disorders affecting multiple structures of eye     Epigastric pain     GERD (gastroesophageal reflux disease)     Hyperlipidemia     Hypertension     IBS (irritable bowel syndrome)     Intermittent atrial fibrillation ([facility] [address] 3/12/2011    Mass of lung     right    Migraine     Nausea & vomiting     Status post lung surgery 3/10/2011    Thyroid disease         Past Surgical History:     Past Surgical History:   Procedure Laterality Date    APPENDECTOMY      BRAIN SURGERY      BUNIONECTOMY      CATARACT REMOVAL  2012    CHOLECYSTECTOMY      COLECTOMY      ENDOSCOPY, COLON, DIAGNOSTIC  8/22/2011    HYSTERECTOMY      JOINT REPLACEMENT      KNEE SURGERY Left 10/11/2016    THORACOTOMY  3/10/11    right thoracotomy;right video assissted thoroscopy biopsey of mediastinal mass for ffrozen  right lower lobe wedge resection for frozen; thymusectomy       Current Medications:     Current Outpatient Medications:     hydrocortisone (ANUSOL-HC) 2.5 % CREA rectal cream, Apply  BID, Disp: 1 Tube, Rfl: 1    apixaban (ELIQUIS) 2.5 MG TABS tablet, Take 1 tablet by mouth 2 times daily, Disp: 60 tablet, Rfl: 1    gabapentin (NEURONTIN) 400 MG capsule, Take 1 capsule by mouth 3 times daily for 120 days. , Disp: 90 capsule, Rfl: 3    metFORMIN (GLUCOPHAGE) 500 MG tablet, Take 1 tablet by mouth daily (with breakfast), Disp: 60 tablet, Rfl: 3    metFORMIN (GLUCOPHAGE) 1000 MG tablet, Take 1 tablet by mouth nightly, Disp: 60 tablet, Rfl: 3    ondansetron (ZOFRAN-ODT) 4 MG disintegrating tablet, Take 1 tablet by mouth every 8 hours as needed for Nausea or Vomiting, Disp: 30 tablet, Rfl: 0    metoprolol succinate (TOPROL XL) 50 MG extended release tablet, Take 1 tablet by mouth daily, Disp: 30 tablet, Rfl: 3    diclofenac sodium (VOLTAREN) 1 % GEL, Apply 2 g topically 2 times Onset    Elevated Lipids Mother     Coronary Art Dis Mother    [facility] Migraines Mother     Hypertension Mother     Kidney Disease Mother     Diabetes Mother     Stroke Father     Hypertension Father     Cancer Sister         ovarian and breast    Coronary Art Dis Brother     Diabetes Brother     Kidney Disease Sister     Hypertension Sister     Hypertension Brother     Diabetes Sister     Migraines Sister     Migraines Brother     Alcohol Abuse Brother        REVIEW OF SYSTEMS: Full ROS noted & scanned   CONSTITUTIONAL: Denies unexplained weight loss, fevers, chills or fatigue  NEUROLOGICAL: Denies unsteady gait or progressive weakness  MUSCULOSKELETAL: Denies joint swelling or redness  PSYCHOLOGICAL: Denies anxiety, depression   SKIN: Denies skin changes, delayed healing, rash, itching   HEMATOLOGIC: Denies easy bleeding or bruising  ENDOCRINE: Denies excessive thirst, urination, heat/cold  RESPIRATORY: Denies current dyspnea, cough  GI: Denies nausea, vomiting, diarrhea   : Denies bowel or bladder issues      PHYSICAL EXAM:    Vitals: There were no vitals taken for this visit. GENERAL EXAM:  · General Apparence: Patient is adequately groomed with no evidence of malnutrition. · Orientation: The patient is oriented to time, place and person. · Mood & Affect:The patient's mood and affect are appropriate. · Vascular: Examination reveals no swelling tenderness in upper or lower extremities. Good capillary refill. · Lymphatic: The lymphatic examination bilaterally reveals all areas to be without enlargement or induration  · Sensation: Sensation is intact without deficit  Coordination/Balance: Patient is nonweightbearing secondary to left femur fracture and transfers with a walker  LUMBAR/SACRAL EXAMINATION: Evaluation was performed with the patient in wheelchair  · Inspection: Local inspection shows no step-off or bruising.   Lumbar alignment is normal.  Sagittal and Coronal balance is neutral. · Palpation:   No evidence of tenderness at the midline. No tenderness bilaterally at the paraspinal or trochanters. There is no step-off or paraspinal spasm. · Range of Motion: Lumbar flexion, extension and rotation are mildly limited due to pain. · Strength:   Strength testing is 4 /5 in all muscle groups tested on the right and unable to test on the left. · Special Tests:   Straight leg raise and crossed SLR negative. Leg length and pelvis level. · Skin: There are no rashes, ulcerations or lesions. · Reflexes: Reflexes are symmetrically 2+ at the patellar and ankle tendons. Clonus absent bilaterally at the feet. · Gait & station: Patient is nonweightbearing and transfers with a walker    · Additional Examinations:   · RIGHT LOWER EXTREMITY: Inspection/examination of the right lower extremity does not show any tenderness, deformity or injury. Range of motion is unremarkable. There is no gross instability. There are no rashes, ulcerations or lesions. Strength and tone are normal.  · LEFT LOWER EXTREMITY: Patient of the left lower extremity reveals the patient to be in a fracture brace. 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 We discussed treatment options including observation, additional oral steroids, physical therapy, epidural injection and microlumbar synovial cyst excision L4-L5. She wishes to proceed with microlumbar synovial cyst excision L4-5.  We discussed the risks, benefits, and alternatives to surgery including the risks of nerve or vessel injury, paralysis, spinal blood clot, spinal fluid leak, death, infection, need for additional surgery for recurrent herniation or low back pain, failure of surgery to alleviate her symptoms and worse symptoms following surgery. She understands these risks and wishes to proceed with surgery. Her questions were answered. She was instructed to call us emergently if she begins to experience bowel or bladder dysfunction, saddle anesthesia, increasing muscle weakness, or right leg symptoms. Risk of the surgery is increased due to the patient's history of previous pulmonary embolus and is currently on 2 different anticoagulations. Follow up -postop    Old records were reviewed. Total evaluation time 30 minutes    Patient examined and note dictated by Mireya Miller PA-C. Patient also seen and examined by Dr. Alexander Flowers.

## 2021-03-09 NOTE — PROGRESS NOTES
Yina Gallardo    Age 66 y.o.    female    1945    MRN [id number]    3/24/2021  Arrival Time_____________  OR Time____________[address]     Procedure(s): MICROLUMBAR LAMINECTOMY WITH EXCISION OF SYNOVIAL CYST L4-5                      General     Surgeon(s):  Albaro Manzanares, MD      DAY ADMIT ___  SDS/OP ___  OUTPT IN BED ___         Phone [phone] (home)    PCP _____________________ Phone_________________ Epic ( ) Epic CE ( ) Appt ________    ADDITIONAL INFO __________________________________ Cardio/Consult _____________    NOTES _____________________________________________________________________    ____________________________________________________________________________    PAT APPT DATE:________ TIME: ________  FAXED QAD: _______  (__) H&P w/ hospitalist  ____________________________________________________________________________    COVID TEST: Date/Location______________        NURSING HISTORY COMPLETE: _______  (__) CBC       (__) W/ DIFF ___________  (__)  ECHO    __________  (__) Hgb A1C    ___________  (__) CHEST X RAY   __________  (__) LIPID PROFILE  ___________  (__) EKG   __________  (__) PT/PTT   ___________  (__) PFT's   __________  (__) BMP   ___________  (__) CAROTIDS  __________  (__) CMP   ___________  (__) VEIN MAPPING  __________  (__) U/A   ___________  (__) HISTORY & PHYSICAL __________  (__) URINE C & S  ___________  (__) CARDIAC CLEARANCE __________  (__) U/A W/ FLEX  ___________  (__) PULM.  CLEARANCE __________  (__) SERUM PREGNANCY ___________  (__) Check Epic DOS orders __________  (__) TYPE & SCREEN ________ repeat ( ) (__)  __________________ __________  (__) ALBUMIN   ___________  (__)  __________________ __________  (__) TRANSFERRIN  ___________  (__)  __________________ __________  (__) LIVER PROFILE  ___________  (__)  __________________ __________  (__) CARBOXY HGB  ___________  (__) URINE PREG DOS __________  (__) NICOTINE & MET.  ___________  (__) BLOOD SUGAR DOS __________  (__) PREALBUMIN  ___________    (__) MRSA NASAL SWAB ___________  (__) BLOOD THINNERS __________  (__) ACE/ ARBS: _____________________    (__) BETABLOCKERS ___________________

## 2021-03-17 RX ORDER — HYDROCODONE BITARTRATE AND ACETAMINOPHEN 10; 325 MG/1; MG/1
1 TABLET ORAL EVERY 8 HOURS PRN
Status: ON HOLD | COMMUNITY
End: 2021-03-24 | Stop reason: HOSPADM

## 2021-03-17 NOTE — PROGRESS NOTES
Obstructive Sleep Apnea (KRIS) Screening     Patient:  Nupur Westfall    YOB: 1945      Medical Record #:  [id number]                     Date:  3/17/2021     1. Are you a loud and/or regular snorer? []  Yes       [x] No    2. Have you been observed to gasp or stop breathing during sleep? []  Yes       [x] No    3. Do you feel tired or groggy upon awakening or do you awaken with a headache? [x]  Yes       [] No    4. Are you often tired or fatigued during the wake time hours? [x]  Yes       [] No    5. Do you fall asleep sitting, reading, watching TV or driving? []  Yes       [x] No    6. Do you often have problems with memory or concentration? []  Yes       [x] No    If patient's KRIS score if greater than or equal to 3, they are considered high risk for KRIS. An anesthesia provider will evaluate the patient and develop a plan of care the day of surgery. Note:  If the patient's BMI is more than 35 kg m¯² , has neck circumference > 40 cm, and/or high blood pressure the risk is greater (© American [employer]nea [employer], 2006).

## 2021-03-18 ENCOUNTER — OFFICE VISIT (OUTPATIENT)
Dept: PRIMARY CARE CLINIC | Age: 76
End: 2021-03-18
Payer: MEDICARE

## 2021-03-18 DIAGNOSIS — Z01.818 PREOP TESTING: Primary | ICD-10-CM

## 2021-03-18 PROCEDURE — G8428 CUR MEDS NOT DOCUMENT: HCPCS | Performed by: NURSE PRACTITIONER

## 2021-03-18 PROCEDURE — 99211 OFF/OP EST MAY X REQ PHY/QHP: CPT | Performed by: NURSE PRACTITIONER

## 2021-03-18 PROCEDURE — G8417 CALC BMI ABV UP PARAM F/U: HCPCS | Performed by: NURSE PRACTITIONER

## 2021-03-18 NOTE — PROGRESS NOTES
Bernadette Mccauley received a viral test for COVID-19. They were educated on isolation and quarantine as appropriate. For any symptoms, they were directed to seek care from their PCP, given contact information to establish with a doctor, directed to an urgent care or the emergency room.

## 2021-03-18 NOTE — PATIENT INSTRUCTIONS

## 2021-03-19 LAB — SARS-COV-2: NOT DETECTED

## 2021-03-24 ENCOUNTER — HOSPITAL ENCOUNTER (OUTPATIENT)
Age: 76
Setting detail: OUTPATIENT SURGERY
Discharge: HOME | End: 2021-03-24
Attending: ORTHOPAEDIC SURGERY | Admitting: ORTHOPAEDIC SURGERY
Payer: MEDICARE

## 2021-03-24 ENCOUNTER — ANESTHESIA EVENT (OUTPATIENT)
Dept: OPERATING ROOM | Age: 76
End: 2021-03-24
Payer: MEDICARE

## 2021-03-24 ENCOUNTER — HOSPITAL ENCOUNTER (OUTPATIENT)
Dept: GENERAL RADIOLOGY | Age: 76
Discharge: HOME | End: 2021-03-24
Attending: ORTHOPAEDIC SURGERY
Payer: MEDICARE

## 2021-03-24 ENCOUNTER — ANESTHESIA (OUTPATIENT)
Dept: OPERATING ROOM | Age: 76
End: 2021-03-24
Payer: MEDICARE

## 2021-03-24 VITALS
RESPIRATION RATE: 13 BRPM | SYSTOLIC BLOOD PRESSURE: 121 MMHG | OXYGEN SATURATION: 100 % | DIASTOLIC BLOOD PRESSURE: 62 MMHG

## 2021-03-24 VITALS
HEART RATE: 68 BPM | TEMPERATURE: 97.1 F | WEIGHT: 180 LBS | BODY MASS INDEX: 33.13 KG/M2 | HEIGHT: 62 IN | DIASTOLIC BLOOD PRESSURE: 60 MMHG | RESPIRATION RATE: 16 BRPM | SYSTOLIC BLOOD PRESSURE: 128 MMHG | OXYGEN SATURATION: 97 %

## 2021-03-24 DIAGNOSIS — M54.50 LOW BACK PAIN, UNSPECIFIED BACK PAIN LATERALITY, UNSPECIFIED CHRONICITY, UNSPECIFIED WHETHER SCIATICA PRESENT: ICD-10-CM

## 2021-03-24 DIAGNOSIS — M71.38 SYNOVIAL CYST OF LUMBAR FACET JOINT: Primary | ICD-10-CM

## 2021-03-24 LAB
ANION GAP SERPL CALCULATED.3IONS-SCNC: 11 MMOL/L (ref 3–16)
BUN BLDV-MCNC: 26 MG/DL (ref 7–20)
CALCIUM SERPL-MCNC: 10.2 MG/DL (ref 8.3–10.6)
CHLORIDE BLD-SCNC: 102 MMOL/L (ref 99–110)
CO2: 25 MMOL/L (ref 21–32)
CREAT SERPL-MCNC: 1 MG/DL (ref 0.6–1.2)
GFR AFRICAN AMERICAN: >60
GFR NON-AFRICAN AMERICAN: 54
GLUCOSE BLD-MCNC: 136 MG/DL (ref 70–99)
GLUCOSE BLD-MCNC: 154 MG/DL (ref 70–99)
GLUCOSE BLD-MCNC: 154 MG/DL (ref 70–99)
PERFORMED ON: ABNORMAL
PERFORMED ON: ABNORMAL
POTASSIUM REFLEX MAGNESIUM: 5.2 MMOL/L (ref 3.5–5.1)
SODIUM BLD-SCNC: 138 MMOL/L (ref 136–145)

## 2021-03-24 PROCEDURE — 3600000005 HC SURGERY LEVEL 5 BASE: Performed by: ORTHOPAEDIC SURGERY

## 2021-03-24 PROCEDURE — 2720000010 HC SURG SUPPLY STERILE: Performed by: ORTHOPAEDIC SURGERY

## 2021-03-24 PROCEDURE — 7100000010 HC PHASE II RECOVERY - FIRST 15 MIN: Performed by: ORTHOPAEDIC SURGERY

## 2021-03-24 PROCEDURE — 6360000002 HC RX W HCPCS: Performed by: ORTHOPAEDIC SURGERY

## 2021-03-24 PROCEDURE — 7100000011 HC PHASE II RECOVERY - ADDTL 15 MIN: Performed by: ORTHOPAEDIC SURGERY

## 2021-03-24 PROCEDURE — 6360000002 HC RX W HCPCS: Performed by: ANESTHESIOLOGY

## 2021-03-24 PROCEDURE — 6360000002 HC RX W HCPCS: Performed by: NURSE ANESTHETIST, CERTIFIED REGISTERED

## 2021-03-24 PROCEDURE — 80048 BASIC METABOLIC PNL TOTAL CA: CPT

## 2021-03-24 PROCEDURE — 2500000003 HC RX 250 WO HCPCS: Performed by: NURSE ANESTHETIST, CERTIFIED REGISTERED

## 2021-03-24 PROCEDURE — 2580000003 HC RX 258: Performed by: ANESTHESIOLOGY

## 2021-03-24 PROCEDURE — 2500000003 HC RX 250 WO HCPCS: Performed by: ORTHOPAEDIC SURGERY

## 2021-03-24 PROCEDURE — 3700000000 HC ANESTHESIA ATTENDED CARE: Performed by: ORTHOPAEDIC SURGERY

## 2021-03-24 PROCEDURE — 7100000001 HC PACU RECOVERY - ADDTL 15 MIN: Performed by: ORTHOPAEDIC SURGERY

## 2021-03-24 PROCEDURE — 3600000015 HC SURGERY LEVEL 5 ADDTL 15MIN: Performed by: ORTHOPAEDIC SURGERY

## 2021-03-24 PROCEDURE — 7100000000 HC PACU RECOVERY - FIRST 15 MIN: Performed by: ORTHOPAEDIC SURGERY

## 2021-03-24 PROCEDURE — 2580000003 HC RX 258: Performed by: ORTHOPAEDIC SURGERY

## 2021-03-24 PROCEDURE — 2709999900 HC NON-CHARGEABLE SUPPLY: Performed by: ORTHOPAEDIC SURGERY

## 2021-03-24 PROCEDURE — 72100 X-RAY EXAM L-S SPINE 2/3 VWS: CPT

## 2021-03-24 PROCEDURE — 3700000001 HC ADD 15 MINUTES (ANESTHESIA): Performed by: ORTHOPAEDIC SURGERY

## 2021-03-24 RX ORDER — ROCURONIUM BROMIDE 10 MG/ML
INJECTION, SOLUTION INTRAVENOUS PRN
Status: DISCONTINUED | OUTPATIENT
Start: 2021-03-24 | End: 2021-03-24 | Stop reason: SDUPTHER

## 2021-03-24 RX ORDER — SODIUM CHLORIDE 0.9 % (FLUSH) 0.9 %
10 SYRINGE (ML) INJECTION PRN
Status: DISCONTINUED | OUTPATIENT
Start: 2021-03-24 | End: 2021-03-24 | Stop reason: HOSPADM

## 2021-03-24 RX ORDER — OXYCODONE HYDROCHLORIDE AND ACETAMINOPHEN 5; 325 MG/1; MG/1
2 TABLET ORAL PRN
Status: DISCONTINUED | OUTPATIENT
Start: 2021-03-24 | End: 2021-03-24 | Stop reason: HOSPADM

## 2021-03-24 RX ORDER — PROPOFOL 10 MG/ML
INJECTION, EMULSION INTRAVENOUS PRN
Status: DISCONTINUED | OUTPATIENT
Start: 2021-03-24 | End: 2021-03-24 | Stop reason: SDUPTHER

## 2021-03-24 RX ORDER — HYDROCODONE BITARTRATE AND ACETAMINOPHEN 5; 325 MG/1; MG/1
2 TABLET ORAL EVERY 4 HOURS PRN
Qty: 40 TABLET | Refills: 0 | Status: SHIPPED | OUTPATIENT
Start: 2021-03-24 | End: 2021-04-21

## 2021-03-24 RX ORDER — HYDRALAZINE HYDROCHLORIDE 20 MG/ML
5 INJECTION INTRAMUSCULAR; INTRAVENOUS EVERY 30 MIN PRN
Status: DISCONTINUED | OUTPATIENT
Start: 2021-03-24 | End: 2021-03-24 | Stop reason: HOSPADM

## 2021-03-24 RX ORDER — SODIUM CHLORIDE, SODIUM LACTATE, POTASSIUM CHLORIDE, CALCIUM CHLORIDE 600; 310; 30; 20 MG/100ML; MG/100ML; MG/100ML; MG/100ML
INJECTION, SOLUTION INTRAVENOUS CONTINUOUS
Status: DISCONTINUED | OUTPATIENT
Start: 2021-03-24 | End: 2021-03-24 | Stop reason: HOSPADM

## 2021-03-24 RX ORDER — SODIUM CHLORIDE 0.9 % (FLUSH) 0.9 %
10 SYRINGE (ML) INJECTION EVERY 12 HOURS SCHEDULED
Status: DISCONTINUED | OUTPATIENT
Start: 2021-03-24 | End: 2021-03-24 | Stop reason: HOSPADM

## 2021-03-24 RX ORDER — ONDANSETRON 2 MG/ML
INJECTION INTRAMUSCULAR; INTRAVENOUS PRN
Status: DISCONTINUED | OUTPATIENT
Start: 2021-03-24 | End: 2021-03-24 | Stop reason: SDUPTHER

## 2021-03-24 RX ORDER — LIDOCAINE HYDROCHLORIDE 10 MG/ML
0.3 INJECTION, SOLUTION EPIDURAL; INFILTRATION; INTRACAUDAL; PERINEURAL
Status: DISCONTINUED | OUTPATIENT
Start: 2021-03-24 | End: 2021-03-24 | Stop reason: HOSPADM

## 2021-03-24 RX ORDER — CLOPIDOGREL BISULFATE 75 MG/1
75 TABLET ORAL DAILY
Qty: 1 TABLET | Refills: 0 | Status: SHIPPED | OUTPATIENT
Start: 2021-03-27

## 2021-03-24 RX ORDER — LABETALOL HYDROCHLORIDE 5 MG/ML
5 INJECTION, SOLUTION INTRAVENOUS
Status: DISCONTINUED | OUTPATIENT
Start: 2021-03-24 | End: 2021-03-24 | Stop reason: HOSPADM

## 2021-03-24 RX ORDER — DIPHENHYDRAMINE HYDROCHLORIDE 50 MG/ML
6.25 INJECTION INTRAMUSCULAR; INTRAVENOUS
Status: DISCONTINUED | OUTPATIENT
Start: 2021-03-24 | End: 2021-03-24 | Stop reason: HOSPADM

## 2021-03-24 RX ORDER — ONDANSETRON 2 MG/ML
4 INJECTION INTRAMUSCULAR; INTRAVENOUS EVERY 30 MIN PRN
Status: DISCONTINUED | OUTPATIENT
Start: 2021-03-24 | End: 2021-03-24 | Stop reason: HOSPADM

## 2021-03-24 RX ORDER — DEXAMETHASONE SODIUM PHOSPHATE 10 MG/ML
INJECTION INTRAMUSCULAR; INTRAVENOUS PRN
Status: DISCONTINUED | OUTPATIENT
Start: 2021-03-24 | End: 2021-03-24 | Stop reason: SDUPTHER

## 2021-03-24 RX ORDER — OXYCODONE HYDROCHLORIDE AND ACETAMINOPHEN 5; 325 MG/1; MG/1
1 TABLET ORAL PRN
Status: DISCONTINUED | OUTPATIENT
Start: 2021-03-24 | End: 2021-03-24 | Stop reason: HOSPADM

## 2021-03-24 RX ORDER — FENTANYL CITRATE 50 UG/ML
INJECTION, SOLUTION INTRAMUSCULAR; INTRAVENOUS PRN
Status: DISCONTINUED | OUTPATIENT
Start: 2021-03-24 | End: 2021-03-24 | Stop reason: SDUPTHER

## 2021-03-24 RX ORDER — DOCUSATE SODIUM 100 MG/1
100 CAPSULE, LIQUID FILLED ORAL 2 TIMES DAILY PRN
Qty: 30 CAPSULE | Refills: 1 | Status: SHIPPED | OUTPATIENT
Start: 2021-03-24

## 2021-03-24 RX ORDER — BUPIVACAINE HYDROCHLORIDE AND EPINEPHRINE 2.5; 5 MG/ML; UG/ML
INJECTION, SOLUTION EPIDURAL; INFILTRATION; INTRACAUDAL; PERINEURAL PRN
Status: DISCONTINUED | OUTPATIENT
Start: 2021-03-24 | End: 2021-03-24 | Stop reason: ALTCHOICE

## 2021-03-24 RX ORDER — MEPERIDINE HYDROCHLORIDE 50 MG/ML
12.5 INJECTION INTRAMUSCULAR; INTRAVENOUS; SUBCUTANEOUS EVERY 5 MIN PRN
Status: DISCONTINUED | OUTPATIENT
Start: 2021-03-24 | End: 2021-03-24 | Stop reason: HOSPADM

## 2021-03-24 RX ADMIN — HYDROMORPHONE HYDROCHLORIDE 0.5 MG: 1 INJECTION, SOLUTION INTRAMUSCULAR; INTRAVENOUS; SUBCUTANEOUS at 14:08

## 2021-03-24 RX ADMIN — SODIUM CHLORIDE, SODIUM LACTATE, POTASSIUM CHLORIDE, AND CALCIUM CHLORIDE: .6; .31; .03; .02 INJECTION, SOLUTION INTRAVENOUS at 11:08

## 2021-03-24 RX ADMIN — DEXAMETHASONE SODIUM PHOSPHATE 10 MG: 10 INJECTION INTRAMUSCULAR; INTRAVENOUS at 11:36

## 2021-03-24 RX ADMIN — PROPOFOL 40 MG: 10 INJECTION, EMULSION INTRAVENOUS at 12:00

## 2021-03-24 RX ADMIN — ONDANSETRON 4 MG: 2 INJECTION INTRAMUSCULAR; INTRAVENOUS at 11:37

## 2021-03-24 RX ADMIN — HYDROMORPHONE HYDROCHLORIDE 0.5 MG: 1 INJECTION, SOLUTION INTRAMUSCULAR; INTRAVENOUS; SUBCUTANEOUS at 12:36

## 2021-03-24 RX ADMIN — HYDROMORPHONE HYDROCHLORIDE 0.5 MG: 1 INJECTION, SOLUTION INTRAMUSCULAR; INTRAVENOUS; SUBCUTANEOUS at 12:52

## 2021-03-24 RX ADMIN — FENTANYL CITRATE 50 MCG: 50 INJECTION INTRAMUSCULAR; INTRAVENOUS at 11:21

## 2021-03-24 RX ADMIN — PROPOFOL 100 MG: 10 INJECTION, EMULSION INTRAVENOUS at 11:14

## 2021-03-24 RX ADMIN — FENTANYL CITRATE 50 MCG: 50 INJECTION INTRAMUSCULAR; INTRAVENOUS at 12:00

## 2021-03-24 RX ADMIN — ONDANSETRON 4 MG: 2 INJECTION INTRAMUSCULAR; INTRAVENOUS at 12:40

## 2021-03-24 RX ADMIN — ROCURONIUM BROMIDE 50 MG: 10 SOLUTION INTRAVENOUS at 11:15

## 2021-03-24 RX ADMIN — HYDROMORPHONE HYDROCHLORIDE 0.5 MG: 1 INJECTION, SOLUTION INTRAMUSCULAR; INTRAVENOUS; SUBCUTANEOUS at 13:40

## 2021-03-24 RX ADMIN — CEFAZOLIN SODIUM 2 G: 10 INJECTION, POWDER, FOR SOLUTION INTRAVENOUS at 11:08

## 2021-03-24 ASSESSMENT — PULMONARY FUNCTION TESTS
PIF_VALUE: 16
PIF_VALUE: 18
PIF_VALUE: 16
PIF_VALUE: 19
PIF_VALUE: 21
PIF_VALUE: 16
PIF_VALUE: 19
PIF_VALUE: 16
PIF_VALUE: 20
PIF_VALUE: 16
PIF_VALUE: 20
PIF_VALUE: 19
PIF_VALUE: 20
PIF_VALUE: 21
PIF_VALUE: 20
PIF_VALUE: 19
PIF_VALUE: 16
PIF_VALUE: 20
PIF_VALUE: 2
PIF_VALUE: 19
PIF_VALUE: 19
PIF_VALUE: 16
PIF_VALUE: 20
PIF_VALUE: 19
PIF_VALUE: 17

## 2021-03-24 ASSESSMENT — PAIN SCALES - GENERAL
PAINLEVEL_OUTOF10: 7
PAINLEVEL_OUTOF10: 9

## 2021-03-24 ASSESSMENT — PAIN DESCRIPTION - DESCRIPTORS: DESCRIPTORS: ACHING

## 2021-03-24 NOTE — PROGRESS NOTES
Patient admitted from OR to PACU. Bedside report received. Patient immediately hooked up to heart monitor. Marny Ganser

## 2021-03-24 NOTE — H&P
I have reviewed the history and physical and examined the patient and find no relevant changes. I have reviewed with the patient and/or family the risks, benefits, and alternatives to the procedure. The patient was counseled at length about the risks of caty Covid-19 during their perioperative period and any recovery window from their procedure. The patient was made aware that caty Covid-19  may worsen their prognosis for recovering from their procedure  and lend to a higher morbidity and/or mortality risk. All material risks, benefits, and reasonable alternatives including postponing the procedure were discussed. The patient does wish to proceed with the procedure at this time. Andrew White MD  3/24/2021 No intake/output data recorded.

## 2021-03-24 NOTE — PROGRESS NOTES
Patient discharged via wheelchair in stable condition with all belongings to private car. Ariel Palmer

## 2021-03-24 NOTE — LETTER
Billing and Coding Fee Ticket    Name:BONNIE CASTRO  : 1945  Diagnosis: Synovial cyst L4-5  Surgeon: Caryn Padilla    DOS: 3/24/21    Procedure:  1. Microlumbar laminotomy, partial facetectomy,excision of synovial cyst L4/L5 98516  2.  Microscope and microsurgical technique 51505

## 2021-03-24 NOTE — ANESTHESIA POSTPROCEDURE EVALUATION
Department of Anesthesiology  Postprocedure Note    Patient: Magaly Sims  MRN: [id number]  YOB: 1945  Date of evaluation: 3/24/2021  Time:  3:48 PM     Procedure Summary     Date: 03/24/21 Room / Location: [facility]    Anesthesia Start: 1108 Anesthesia Stop: 9781    Procedure: MICROLUMBAR LAMINECTOMY WITH EXCISION OF SYNOVIAL CYST L4-5 (N/A Spine Lumbar) Diagnosis:       Lumbar stenosis with neurogenic claudication      (LUMBAR STENOSIS WITH NEUROGENIC CLAUDICATION)    Surgeons: Aysha Bruce MD Responsible Provider: Jose Mon MD    Anesthesia Type: general ASA Status: 3          Anesthesia Type: general    Rafaela Phase I: Rafaela Score: 8    Rafaela Phase II: Rafaela Score: 10    Last vitals: Reviewed and per EMR flowsheets.        Anesthesia Post Evaluation    Comments: Postoperative Anesthesia Note    Name:    Magaly Sims  MRN:      [id number]    Patient Vitals in the past 12 hrs:  03/24/21 1430, BP:128/60, Temp:97.1 °F (36.2 °C), Temp src:Temporal, Pulse:68, Resp:16, SpO2:97 %  03/24/21 1425, BP:(!) 140/62, Pulse:70, Resp:24, SpO2:100 %  03/24/21 1415, BP:(!) 93/43, Pulse:72, Resp:14, SpO2:(!) 89 %  03/24/21 1410, BP:(!) 136/57, Pulse:70, Resp:17, SpO2:96 %  03/24/21 1245, BP:(!) 147/101, Pulse:70, Resp:22, SpO2:98 %  03/24/21 1226, Temp:97 °F (36.1 °C), Temp src:Temporal  03/24/21 0959, BP:(!) 169/98, Temp:98.3 °F (36.8 °C), Temp src:Temporal, Pulse:75, Resp:16, SpO2:96 %     LABS:    CBC  Lab Results       Component                Value               Date/Time                  WBC                      8.3                 01/08/2021 07:15 AM        HGB                      9.5 (L)             01/08/2021 07:15 AM        HCT                      29.3 (L)            01/08/2021 07:15 AM        PLT                      464 (H)             01/08/2021 07:15 AM   RENAL  Lab Results       Component                Value               Date/Time                  NA 138                 03/24/2021 09:55 AM        K                        5.2 (H)             03/24/2021 09:55 AM        CL                       102                 03/24/2021 09:55 AM        CO2                      25                  03/24/2021 09:55 AM        BUN                      26 (H)              03/24/2021 09:55 AM        CREATININE               1.0                 03/24/2021 09:55 AM        GLUCOSE                  154 (H)             03/24/2021 09:55 AM        GLUCOSE                  133 (H)             10/24/2016 11:44 AM   COAGS  No results found for: PROTIME, INR, APTT    Intake & Output: In: 320 (P.O.:120; I.V.:200)  Out: -     Nausea & Vomiting:  No    Level of Consciousness:  Awake    Pain Assessment:  Adequate analgesia    Anesthesia Complications:  No apparent anesthetic complications    SUMMARY      Vital signs stable  OK to discharge from Stage I post anesthesia care.   Care transferred from Anesthesiology department on discharge from perioperative area

## 2021-03-24 NOTE — PROGRESS NOTES
Pt ready for surgery, VSS, family at bedside and call light within reach, No needs at this time. Pt has chronic right leg and foot pain with toe numbness. Pain is 5/10 chronic.

## 2021-03-24 NOTE — OP NOTE
Operative Note      Patient: Merrill Jhaveri  YOB: 1945  MRN: [id number]    Date of Procedure: 3/24/2021    Pre-Op Diagnosis: Synovial cyst L4-5    Post-Op Diagnosis: Same       Procedure(s): MICROLUMBAR LAMINECTOMY WITH EXCISION OF SYNOVIAL CYST L4-5    Surgeon(s):  Davina Collins MD    Assistant:   Surgical Assistant: Madelin Gardner    Anesthesia: General    Estimated Blood Loss (mL): less than 50     Complications: None    Specimens:   * No specimens in log *    Implants:  * No implants in log *      Drains: * No LDAs found *    Findings:  Synovial cyst    Detailed Description of Procedure:     INDICATIONS FOR SURGERY:   Merrill Jhaveri is a 66 y.o. female with back and right leg pain. The symptoms failed to respond to conservative intervention. An MRI scan was performed and this showed evidence of a large synovial cyst at the L4-5 level on the right, which caused spinal stenosis. Having failed conservative management and experiencing persistent symptoms, the patient elected to proceed ahead with the surgical option listed above. DETAILS OF PROCEDURE:  The patient was brought to the operating room and placed under general anesthesia. The patient was then placed prone on a Esvin table. All bony prominences were inspected and padded prior to sterile draping. Using a #10 blade knife, the skin was incised in the midline and monopolar cautery was used to dissect through the subcutaneous tissue to open the fascia and reflect the paraspinal muscles laterally exposing the L4-5 interspace on the right side. The microscope was then brought into the field and used to assist with performing a microsurgical hemilaminotomy, partial facetectomy and excision of a synovial cyst at this level. Using the [employer] drill, I burred down the hemilamina of L4, a portion of the inferior L5 lamina and the medial portion of the facet joint.  The underlying ligamentum flavum was freed with the [facility] hook from the underlying dura and removed with the 3 mm punch laterally, exposing the lateral dural tube, a large synovial cyst and takeoff of the L5 nerve root. The edges of the synovial cyst were identified. The cyst, was firmly adherent to the dura. I carefully dissected the cyst from the dura using a micronerve hook and removed the cyst. The L5 nerve root and the thecal sac were identified and noted to be without dura tears. The wound was copiously irrigated with antibiotic solution. 0.5% Marcaine in subcutaneous tissues for analgesia. The fascia was then reapproximated using interrupted 0 Vicryl sutures and interrupted 3-0 Vicryl sutures followed by a 4-0 Vicryl subcuticular suture were used to reapproximate the subcuticular layer. A sterile dressing was then applied. The patient was extubated in the operating room and transferred to the recovery room in stable condition. There were no complications. Boo Camejo M.D.     Electronically signed by Jovani Thakur MD on 3/24/2021 at 12:03 PM

## 2021-03-24 NOTE — ANESTHESIA PRE PROCEDURE
 HYDROmorphone (DILAUDID) injection 0.5 mg  0.5 mg Intravenous Q10 Min PRN Leroy Messer MD        HYDROmorphone (DILAUDID) injection 0.5 mg  0.5 mg Intravenous Q5 Min PRN Leroy Messer MD        oxyCODONE-acetaminophen (PERCOCET) 5-325 MG per tablet 1 tablet  1 tablet Oral PRN Leroy Messer MD        Or    oxyCODONE-acetaminophen (PERCOCET) 5-325 MG per tablet 2 tablet  2 tablet Oral PRN Leroy Messer MD        ondansetron [facility]) injection 4 mg  4 mg Intravenous Q30 Min PRN Leroy Messer MD        diphenhydrAMINE (BENADRYL) injection 6.25 mg  6.25 mg Intravenous Once PRN Leroy Messer MD        labetalol (NORMODYNE;TRANDATE) injection 5 mg  5 mg Intravenous Q15 Min PRN Leroy Messer MD        hydrALAZINE (APRESOLINE) injection 5 mg  5 mg Intravenous Q30 Min PRN Leroy Messer MD           Allergies:     Allergies   Allergen Reactions    Aspirin      CONGESTION    Codeine      RESPIRATORY DISTRESS    Other      METAL-RASH; cheap metal in expensive jewelry    Oxycodone     Percocet [Oxycodone-Acetaminophen]      JITTERY    Trimethoprim     Bactrim Rash    Dilaudid [Hydromorphone Hcl] Rash     Pt reports rash and swelling in hands- PATIENT DOESN'T REMEMBER THIS REACTION AS OF 7/23/12    Penicillins Rash       Problem List:    Patient Active Problem List   Diagnosis Code    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    Nodule of right lung R91.1    Diabetes mellitus ([facility] [address] E11.9    HTN (hypertension) I10    GERD (gastroesophageal reflux disease) K21.9    Arthritis M19.90    Hypothyroidism E03.9    Anxiety disorder F41.9    Gastroesophageal reflux disease with hiatal hernia K21.9, K44.9    Status post lung surgery Z98.890    Intermittent atrial fibrillation (HCC) I48.0    B12 deficiency E53.8    Iron deficiency anemia due to dietary causes D50.8    Unspecified adverse effect of other drug, medicinal and biological substance(995.29) T50.995A    Factor V [facility]) B46.97    Pulmonary embolism (HCC) I26.99    Deep vein thrombosis (DVT) (HCC) I82.409    Treatment Z51.89    Iron deficiency E61.1    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    Closed fracture of distal end of left femur, initial encounter ([facility] [address] S72.402A       Past Medical History:        Diagnosis Date    Allergic     Anemia     Arnold-Chiari malformation (HCC)     Arthritis     CAD (coronary artery disease)     Chronic kidney disease     Depression     Diabetes mellitus (HCC)     Disorders affecting multiple structures of eye     Epigastric pain     GERD (gastroesophageal reflux disease)     Hx of blood clots     PE in both lungs and one DVT left leg    Hyperlipidemia     Hypertension     IBS (irritable bowel syndrome)     [facility] 3[facility]12/2011    Mass of lung     right    Migraine     Nausea & vomiting     PONV (postoperative nausea and vomiting)     Status post lung surgery 3/10/2011    Thyroid disease        Past Surgical History:        Procedure Laterality Date    ABDOMEN SURGERY  2012    bowel perf after colonoscopy    APPENDECTOMY      BUNIONECTOMY      CARDIAC SURGERY  2020    coronary stent    CATARACT REMOVAL  2012    CHOLECYSTECTOMY      COLECTOMY      ENDOSCOPY, COLON, DIAGNOSTIC  8/22/2011    FRACTURE SURGERY Left 12/20/2020    left femur    HYSTERECTOMY      JOINT REPLACEMENT      KNEE SURGERY Left 10/11/2016    THORACOTOMY  3/10/11    right thoracotomy;right video assissted thoroscopy biopsey of mediastinal mass for ffrozen  right lower lobe wedge resection for frozen; thymusectomy       Social History:    Social History     Tobacco Use    Smoking status: Never Smoker    Smokeless tobacco: Never Used   Substance Use Topics    Alcohol use:  No                                Counseling given: Not Answered      Vital Signs (Current):   Vitals:    03/17/21 1258 03/24/21 0959   BP:  (!) 169/98   Pulse:  75   Resp:  16   Temp:  98.3 °F 03/18/2021           Anesthesia Evaluation  Patient summary reviewed and Nursing notes reviewed   history of anesthetic complications: PONV. Airway: Mallampati: II     Neck ROM: full   Dental:          Pulmonary:                              Cardiovascular:    (+) hypertension:, CAD:,                   Neuro/Psych:   (+) neuromuscular disease:, headaches:, psychiatric history:            GI/Hepatic/Renal:   (+) GERD:,           Endo/Other:    (+) Diabetes, hypothyroidism::., .                 Abdominal:           Vascular:                                        Anesthesia Plan      general     ASA 3     (Medications & allergies reviewed  All available lab & EKG data reviewed)  Induction: intravenous. Anesthetic plan and risks discussed with patient. Plan discussed with CRNA.                   Karlos Swann MD   3/24/2021

## 2021-03-31 ENCOUNTER — TELEPHONE (OUTPATIENT)
Dept: ORTHOPEDIC SURGERY | Age: 76
End: 2021-03-31

## 2021-03-31 RX ORDER — METHYLPREDNISOLONE 16 MG/1
TABLET ORAL
Qty: 12 TABLET | Refills: 0 | Status: SHIPPED | OUTPATIENT
Start: 2021-03-31

## 2021-04-12 ENCOUNTER — OFFICE VISIT (OUTPATIENT)
Dept: ORTHOPEDIC SURGERY | Age: 76
End: 2021-04-12

## 2021-04-12 VITALS — BODY MASS INDEX: 33.13 KG/M2 | WEIGHT: 180 LBS | HEIGHT: 62 IN

## 2021-04-12 DIAGNOSIS — M71.38 SYNOVIAL CYST OF LUMBAR FACET JOINT: ICD-10-CM

## 2021-04-12 DIAGNOSIS — M48.062 LUMBAR STENOSIS WITH NEUROGENIC CLAUDICATION: Primary | ICD-10-CM

## 2021-04-12 PROCEDURE — 99024 POSTOP FOLLOW-UP VISIT: CPT | Performed by: PHYSICIAN ASSISTANT

## 2021-06-02 ENCOUNTER — CLINICAL DOCUMENTATION (OUTPATIENT)
Dept: OTHER | Age: 76
End: 2021-06-02

## (undated) DEVICE — COVER,MAYO STAND,XL,STERILE: Brand: MEDLINE

## (undated) DEVICE — SUTURE VCRL + SZ 3-0 L18IN ABSRB UD SH 1/2 CIR TAPERCUT NDL VCP864D

## (undated) DEVICE — KIT JACK TBL PT CARE

## (undated) DEVICE — SOLUTION IV IRRIG POUR BRL 0.9% SODIUM CHL 2F7124

## (undated) DEVICE — Device

## (undated) DEVICE — TOOL 14MH30 LEGEND 14CM 3MM: Brand: MIDAS REX ™

## (undated) DEVICE — SUTURE MCRYL + SZ 4-0 L18IN ABSRB UD L19MM PS-2 3/8 CIR MCP496G

## (undated) DEVICE — SUTURE VCRL SZ 0 L27IN ABSRB UD L26MM CT-2 1/2 CIR J270H

## (undated) DEVICE — CRADLE POS PRONE 24 X 5 X 3 IN ARM N COMPR NO CVR FOAM DISP

## (undated) DEVICE — GLOVE ORANGE PI 7 1/2   MSG9075